# Patient Record
Sex: FEMALE | Race: WHITE | NOT HISPANIC OR LATINO | Employment: OTHER | URBAN - NONMETROPOLITAN AREA
[De-identification: names, ages, dates, MRNs, and addresses within clinical notes are randomized per-mention and may not be internally consistent; named-entity substitution may affect disease eponyms.]

---

## 2017-11-27 ENCOUNTER — HOSPITAL ENCOUNTER (EMERGENCY)
Facility: HOSPITAL | Age: 77
Discharge: HOME | End: 2017-11-27
Attending: EMERGENCY MEDICINE | Admitting: EMERGENCY MEDICINE

## 2017-11-27 VITALS
HEIGHT: 65 IN | BODY MASS INDEX: 21.66 KG/M2 | DIASTOLIC BLOOD PRESSURE: 71 MMHG | TEMPERATURE: 98.3 F | WEIGHT: 130 LBS | RESPIRATION RATE: 18 BRPM | HEART RATE: 76 BPM | SYSTOLIC BLOOD PRESSURE: 122 MMHG | OXYGEN SATURATION: 96 %

## 2017-11-27 DIAGNOSIS — S60.512A CAT SCRATCH OF LEFT HAND, INITIAL ENCOUNTER: Primary | ICD-10-CM
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
## 2017-11-27 PROCEDURE — 99283 EMERGENCY DEPT VISIT LOW MDM: CPT

## 2017-11-27 PROCEDURE — 25010000002 TDAP 5-2.5-18.5 LF-MCG/0.5 SUSPENSION: Performed by: EMERGENCY MEDICINE

## 2017-11-27 PROCEDURE — 90471 IMMUNIZATION ADMIN: CPT | Performed by: EMERGENCY MEDICINE

## 2017-11-27 PROCEDURE — 90715 TDAP VACCINE 7 YRS/> IM: CPT | Performed by: EMERGENCY MEDICINE

## 2017-11-27 RX ORDER — ASPIRIN 81 MG/1
81 TABLET, CHEWABLE ORAL DAILY
COMMUNITY
End: 2018-05-18 | Stop reason: HOSPADM

## 2017-11-27 RX ORDER — AMOXICILLIN AND CLAVULANATE POTASSIUM 875; 125 MG/1; MG/1
1 TABLET, FILM COATED ORAL 2 TIMES DAILY
Qty: 14 TABLET | Refills: 0 | Status: SHIPPED | OUTPATIENT
Start: 2017-11-27 | End: 2018-05-18 | Stop reason: HOSPADM

## 2017-11-27 RX ORDER — PRAVASTATIN SODIUM 10 MG
10 TABLET ORAL EVERY OTHER DAY
Status: ON HOLD | COMMUNITY
End: 2018-05-20

## 2017-11-27 RX ORDER — CHLORAL HYDRATE 500 MG
1000 CAPSULE ORAL 2 TIMES DAILY WITH MEALS
COMMUNITY
End: 2018-05-18 | Stop reason: HOSPADM

## 2017-11-27 RX ORDER — UBIDECARENONE 100 MG
100 CAPSULE ORAL NIGHTLY
COMMUNITY

## 2017-11-27 RX ADMIN — TETANUS TOXOID, REDUCED DIPHTHERIA TOXOID AND ACELLULAR PERTUSSIS VACCINE, ADSORBED 0.5 ML: 5; 2.5; 8; 8; 2.5 SUSPENSION INTRAMUSCULAR at 22:39

## 2017-12-05 NOTE — ED NOTES
"ED Call Back Questions    1. How are you doing since leaving the Emergency Department?    Doing better, healing up good  2. Do you have any questions about your discharge instructions? No     3. Have you filled your new prescriptions yet? Yes   a. Do you have any questions about those medications? No     4. Were you able to make a follow-up appointment with the physician? Yes     5. Do you have a primary care physician? Yes   a. If No, would you like for me to set you up with one? N/A  i. If Yes, “I will have our ED  give you a call right back at this number to work with you on the best time for an appointment.”    6. We are always looking to get better at what we do. Do you have any suggestions for what we can do to be even better? No   a. If Yes, \"Thank you for sharing your concerns. I apologize. I will follow up with our manager and patient . Would you like someone to call you back?\" No     7. Is there anything else I can do for you? No   Visit was good, dr. Rubin was great     Sebastien Rapp  12/05/17 1228    "

## 2018-05-17 ENCOUNTER — HOSPITAL ENCOUNTER (OUTPATIENT)
Facility: HOSPITAL | Age: 78
Setting detail: OBSERVATION
Discharge: HOME | End: 2018-05-18
Attending: FAMILY MEDICINE | Admitting: FAMILY MEDICINE

## 2018-05-17 ENCOUNTER — APPOINTMENT (OUTPATIENT)
Dept: GENERAL RADIOLOGY | Facility: HOSPITAL | Age: 78
End: 2018-05-17

## 2018-05-17 ENCOUNTER — APPOINTMENT (OUTPATIENT)
Dept: CARDIOLOGY | Facility: HOSPITAL | Age: 78
End: 2018-05-17

## 2018-05-17 PROBLEM — I48.91 ATRIAL FIBRILLATION WITH RVR (HCC): Status: ACTIVE | Noted: 2018-05-17

## 2018-05-17 LAB
ALBUMIN SERPL-MCNC: 4.4 G/DL (ref 3.5–5)
ALBUMIN/GLOB SERPL: 1.6 G/DL (ref 1.1–2.5)
ALP SERPL-CCNC: 101 U/L (ref 24–120)
ALT SERPL W P-5'-P-CCNC: 104 U/L (ref 0–54)
ANION GAP SERPL CALCULATED.3IONS-SCNC: 9 MMOL/L (ref 4–13)
ARTICHOKE IGE QN: 72 MG/DL (ref 0–99)
AST SERPL-CCNC: 52 U/L (ref 7–45)
BASOPHILS # BLD AUTO: 0.07 10*3/MM3 (ref 0–0.2)
BASOPHILS NFR BLD AUTO: 0.6 % (ref 0–2)
BH CV ECHO MEAS - AI DEC SLOPE: 338 CM/SEC^2
BH CV ECHO MEAS - AI MAX PG: 71.9 MMHG
BH CV ECHO MEAS - AI MAX VEL: 423.7 CM/SEC
BH CV ECHO MEAS - AI P1/2T: 367.1 MSEC
BH CV ECHO MEAS - AO MAX PG (FULL): 7.8 MMHG
BH CV ECHO MEAS - AO MAX PG: 10.2 MMHG
BH CV ECHO MEAS - AO MEAN PG (FULL): 5 MMHG
BH CV ECHO MEAS - AO MEAN PG: 6 MMHG
BH CV ECHO MEAS - AO ROOT AREA (BSA CORRECTED): 1.9
BH CV ECHO MEAS - AO ROOT AREA: 7.5 CM^2
BH CV ECHO MEAS - AO ROOT DIAM: 3.1 CM
BH CV ECHO MEAS - AO V2 MAX: 160 CM/SEC
BH CV ECHO MEAS - AO V2 MEAN: 122 CM/SEC
BH CV ECHO MEAS - AO V2 VTI: 35.7 CM
BH CV ECHO MEAS - AVA(I,A): 0.94 CM^2
BH CV ECHO MEAS - AVA(I,D): 0.94 CM^2
BH CV ECHO MEAS - AVA(V,A): 0.98 CM^2
BH CV ECHO MEAS - AVA(V,D): 0.98 CM^2
BH CV ECHO MEAS - BSA(HAYCOCK): 1.6 M^2
BH CV ECHO MEAS - BSA: 1.6 M^2
BH CV ECHO MEAS - BZI_BMI: 22.1 KILOGRAMS/M^2
BH CV ECHO MEAS - BZI_METRIC_HEIGHT: 162.6 CM
BH CV ECHO MEAS - BZI_METRIC_WEIGHT: 58.5 KG
BH CV ECHO MEAS - CONTRAST EF 4CH: 52.2 ML/M^2
BH CV ECHO MEAS - EDV(CUBED): 42.9 ML
BH CV ECHO MEAS - EDV(MOD-SP4): 36.6 ML
BH CV ECHO MEAS - EDV(TEICH): 50.9 ML
BH CV ECHO MEAS - EF(CUBED): 59 %
BH CV ECHO MEAS - EF(MOD-SP4): 52.2 %
BH CV ECHO MEAS - EF(TEICH): 51.6 %
BH CV ECHO MEAS - ESV(CUBED): 17.6 ML
BH CV ECHO MEAS - ESV(MOD-SP4): 17.5 ML
BH CV ECHO MEAS - ESV(TEICH): 24.6 ML
BH CV ECHO MEAS - FS: 25.7 %
BH CV ECHO MEAS - IVS/LVPW: 0.92
BH CV ECHO MEAS - IVSD: 1.1 CM
BH CV ECHO MEAS - LA DIMENSION: 3.4 CM
BH CV ECHO MEAS - LA/AO: 1.1
BH CV ECHO MEAS - LAT PEAK E' VEL: 9.3 CM/SEC
BH CV ECHO MEAS - LV DIASTOLIC VOL/BSA (35-75): 22.5 ML/M^2
BH CV ECHO MEAS - LV MASS(C)D: 127.3 GRAMS
BH CV ECHO MEAS - LV MASS(C)DI: 78.4 GRAMS/M^2
BH CV ECHO MEAS - LV MAX PG: 2.5 MMHG
BH CV ECHO MEAS - LV MEAN PG: 1 MMHG
BH CV ECHO MEAS - LV SYSTOLIC VOL/BSA (12-30): 10.8 ML/M^2
BH CV ECHO MEAS - LV V1 MAX: 78.3 CM/SEC
BH CV ECHO MEAS - LV V1 MEAN: 55.2 CM/SEC
BH CV ECHO MEAS - LV V1 VTI: 16.7 CM
BH CV ECHO MEAS - LVIDD: 3.5 CM
BH CV ECHO MEAS - LVIDS: 2.6 CM
BH CV ECHO MEAS - LVLD AP4: 5.8 CM
BH CV ECHO MEAS - LVLS AP4: 5.7 CM
BH CV ECHO MEAS - LVOT AREA (M): 2 CM^2
BH CV ECHO MEAS - LVOT AREA: 2 CM^2
BH CV ECHO MEAS - LVOT DIAM: 1.6 CM
BH CV ECHO MEAS - LVPWD: 1.2 CM
BH CV ECHO MEAS - MED PEAK E' VEL: 6.8 CM/SEC
BH CV ECHO MEAS - MR ALIAS VEL: 30.8 CM/SEC
BH CV ECHO MEAS - MR ERO: 0.09 CM^2
BH CV ECHO MEAS - MR FLOW RATE: 48.4 CM^3/SEC
BH CV ECHO MEAS - MR MAX PG: 108.6 MMHG
BH CV ECHO MEAS - MR MAX VEL: 521 CM/SEC
BH CV ECHO MEAS - MR MEAN PG: 74 MMHG
BH CV ECHO MEAS - MR MEAN VEL: 402 CM/SEC
BH CV ECHO MEAS - MR PISA RADIUS: 0.5 CM
BH CV ECHO MEAS - MR PISA: 1.6 CM^2
BH CV ECHO MEAS - MR VOLUME: 16.3 ML
BH CV ECHO MEAS - MR VTI: 175 CM
BH CV ECHO MEAS - MV DEC TIME: 0.25 SEC
BH CV ECHO MEAS - MV E MAX VEL: 108 CM/SEC
BH CV ECHO MEAS - RAP SYSTOLE: 10 MMHG
BH CV ECHO MEAS - RVDD: 3.1 CM
BH CV ECHO MEAS - RVSP: 44 MMHG
BH CV ECHO MEAS - SI(AO): 166 ML/M^2
BH CV ECHO MEAS - SI(CUBED): 15.6 ML/M^2
BH CV ECHO MEAS - SI(LVOT): 20.7 ML/M^2
BH CV ECHO MEAS - SI(MOD-SP4): 11.8 ML/M^2
BH CV ECHO MEAS - SI(TEICH): 16.2 ML/M^2
BH CV ECHO MEAS - SV(AO): 269.5 ML
BH CV ECHO MEAS - SV(CUBED): 25.3 ML
BH CV ECHO MEAS - SV(LVOT): 33.6 ML
BH CV ECHO MEAS - SV(MOD-SP4): 19.1 ML
BH CV ECHO MEAS - SV(TEICH): 26.3 ML
BH CV ECHO MEAS - TR MAX VEL: 318 CM/SEC
BH CV ECHO MEASUREMENTS AVERAGE E/E' RATIO: 13.42
BILIRUB SERPL-MCNC: 0.4 MG/DL (ref 0.1–1)
BUN BLD-MCNC: 17 MG/DL (ref 5–21)
BUN/CREAT SERPL: 25.8 (ref 7–25)
CALCIUM SPEC-SCNC: 9.3 MG/DL (ref 8.4–10.4)
CHLORIDE SERPL-SCNC: 93 MMOL/L (ref 98–110)
CHOLEST SERPL-MCNC: 149 MG/DL (ref 130–200)
CO2 SERPL-SCNC: 34 MMOL/L (ref 24–31)
CREAT BLD-MCNC: 0.66 MG/DL (ref 0.5–1.4)
DEPRECATED RDW RBC AUTO: 45 FL (ref 40–54)
EOSINOPHIL # BLD AUTO: 0.12 10*3/MM3 (ref 0–0.7)
EOSINOPHIL NFR BLD AUTO: 1 % (ref 0–4)
ERYTHROCYTE [DISTWIDTH] IN BLOOD BY AUTOMATED COUNT: 13 % (ref 12–15)
GFR SERPL CREATININE-BSD FRML MDRD: 87 ML/MIN/1.73
GLOBULIN UR ELPH-MCNC: 2.8 GM/DL
GLUCOSE BLD-MCNC: 87 MG/DL (ref 70–100)
HBA1C MFR BLD: 5.3 %
HCT VFR BLD AUTO: 44.5 % (ref 37–47)
HDLC SERPL-MCNC: 64 MG/DL
HGB BLD-MCNC: 14.7 G/DL (ref 12–16)
IMM GRANULOCYTES # BLD: 0.06 10*3/MM3 (ref 0–0.03)
IMM GRANULOCYTES NFR BLD: 0.5 % (ref 0–5)
LDLC/HDLC SERPL: 1.07 {RATIO}
LEFT ATRIUM VOLUME INDEX: 36.7 ML/M2
LEFT ATRIUM VOLUME: 59.5 CM3
LV EF 2D ECHO EST: 50 %
LYMPHOCYTES # BLD AUTO: 2.22 10*3/MM3 (ref 0.72–4.86)
LYMPHOCYTES NFR BLD AUTO: 18.9 % (ref 15–45)
MAGNESIUM SERPL-MCNC: 1.7 MG/DL (ref 1.4–2.2)
MAXIMAL PREDICTED HEART RATE: 142 BPM
MCH RBC QN AUTO: 31 PG (ref 28–32)
MCHC RBC AUTO-ENTMCNC: 33 G/DL (ref 33–36)
MCV RBC AUTO: 93.9 FL (ref 82–98)
MONOCYTES # BLD AUTO: 0.9 10*3/MM3 (ref 0.19–1.3)
MONOCYTES NFR BLD AUTO: 7.7 % (ref 4–12)
NEUTROPHILS # BLD AUTO: 8.39 10*3/MM3 (ref 1.87–8.4)
NEUTROPHILS NFR BLD AUTO: 71.3 % (ref 39–78)
NRBC BLD MANUAL-RTO: 0 /100 WBC (ref 0–0)
NT-PROBNP SERPL-MCNC: 1140 PG/ML (ref 0–1800)
PLATELET # BLD AUTO: 283 10*3/MM3 (ref 130–400)
PMV BLD AUTO: 8.6 FL (ref 6–12)
POTASSIUM BLD-SCNC: 4.8 MMOL/L (ref 3.5–5.3)
PROT SERPL-MCNC: 7.2 G/DL (ref 6.3–8.7)
RBC # BLD AUTO: 4.74 10*6/MM3 (ref 4.2–5.4)
SODIUM BLD-SCNC: 136 MMOL/L (ref 135–145)
STRESS TARGET HR: 121 BPM
T4 FREE SERPL-MCNC: 1.42 NG/DL (ref 0.78–2.19)
TRIGL SERPL-MCNC: 84 MG/DL (ref 0–149)
TROPONIN I SERPL-MCNC: <0.012 NG/ML (ref 0–0.03)
TSH SERPL DL<=0.05 MIU/L-ACNC: 2.48 MIU/ML (ref 0.47–4.68)
WBC NRBC COR # BLD: 11.76 10*3/MM3 (ref 4.8–10.8)

## 2018-05-17 PROCEDURE — 96375 TX/PRO/DX INJ NEW DRUG ADDON: CPT

## 2018-05-17 PROCEDURE — 84439 ASSAY OF FREE THYROXINE: CPT | Performed by: FAMILY MEDICINE

## 2018-05-17 PROCEDURE — G0378 HOSPITAL OBSERVATION PER HR: HCPCS

## 2018-05-17 PROCEDURE — 96361 HYDRATE IV INFUSION ADD-ON: CPT

## 2018-05-17 PROCEDURE — 84443 ASSAY THYROID STIM HORMONE: CPT | Performed by: FAMILY MEDICINE

## 2018-05-17 PROCEDURE — 93306 TTE W/DOPPLER COMPLETE: CPT | Performed by: INTERNAL MEDICINE

## 2018-05-17 PROCEDURE — 96366 THER/PROPH/DIAG IV INF ADDON: CPT

## 2018-05-17 PROCEDURE — 83036 HEMOGLOBIN GLYCOSYLATED A1C: CPT | Performed by: PHYSICIAN ASSISTANT

## 2018-05-17 PROCEDURE — 96365 THER/PROPH/DIAG IV INF INIT: CPT

## 2018-05-17 PROCEDURE — 94799 UNLISTED PULMONARY SVC/PX: CPT

## 2018-05-17 PROCEDURE — 80053 COMPREHEN METABOLIC PANEL: CPT | Performed by: FAMILY MEDICINE

## 2018-05-17 PROCEDURE — 80061 LIPID PANEL: CPT | Performed by: PHYSICIAN ASSISTANT

## 2018-05-17 PROCEDURE — 83735 ASSAY OF MAGNESIUM: CPT | Performed by: PHYSICIAN ASSISTANT

## 2018-05-17 PROCEDURE — 84484 ASSAY OF TROPONIN QUANT: CPT | Performed by: FAMILY MEDICINE

## 2018-05-17 PROCEDURE — 93306 TTE W/DOPPLER COMPLETE: CPT

## 2018-05-17 PROCEDURE — 85025 COMPLETE CBC W/AUTO DIFF WBC: CPT | Performed by: FAMILY MEDICINE

## 2018-05-17 PROCEDURE — 94760 N-INVAS EAR/PLS OXIMETRY 1: CPT

## 2018-05-17 PROCEDURE — 71046 X-RAY EXAM CHEST 2 VIEWS: CPT

## 2018-05-17 PROCEDURE — 83880 ASSAY OF NATRIURETIC PEPTIDE: CPT | Performed by: PHYSICIAN ASSISTANT

## 2018-05-17 PROCEDURE — 93005 ELECTROCARDIOGRAM TRACING: CPT | Performed by: FAMILY MEDICINE

## 2018-05-17 PROCEDURE — 99205 OFFICE O/P NEW HI 60 MIN: CPT | Performed by: INTERNAL MEDICINE

## 2018-05-17 PROCEDURE — G0379 DIRECT REFER HOSPITAL OBSERV: HCPCS

## 2018-05-17 PROCEDURE — 93010 ELECTROCARDIOGRAM REPORT: CPT | Performed by: INTERNAL MEDICINE

## 2018-05-17 RX ORDER — SODIUM CHLORIDE 0.9 % (FLUSH) 0.9 %
1-10 SYRINGE (ML) INJECTION AS NEEDED
Status: DISCONTINUED | OUTPATIENT
Start: 2018-05-17 | End: 2018-05-18 | Stop reason: HOSPADM

## 2018-05-17 RX ORDER — SODIUM CHLORIDE 9 MG/ML
100 INJECTION, SOLUTION INTRAVENOUS CONTINUOUS
Status: DISCONTINUED | OUTPATIENT
Start: 2018-05-17 | End: 2018-05-18 | Stop reason: HOSPADM

## 2018-05-17 RX ORDER — DILTIAZEM HYDROCHLORIDE 5 MG/ML
10 INJECTION INTRAVENOUS ONCE
Status: COMPLETED | OUTPATIENT
Start: 2018-05-17 | End: 2018-05-17

## 2018-05-17 RX ADMIN — DILTIAZEM HYDROCHLORIDE 30 MG: 30 TABLET, FILM COATED ORAL at 17:30

## 2018-05-17 RX ADMIN — DILTIAZEM HYDROCHLORIDE 5 MG/HR: 5 INJECTION INTRAVENOUS at 14:21

## 2018-05-17 RX ADMIN — DILTIAZEM HYDROCHLORIDE 10 MG: 5 INJECTION INTRAVENOUS at 14:22

## 2018-05-17 RX ADMIN — SODIUM CHLORIDE 100 ML/HR: 9 INJECTION, SOLUTION INTRAVENOUS at 16:47

## 2018-05-17 RX ADMIN — APIXABAN 5 MG: 5 TABLET, FILM COATED ORAL at 20:01

## 2018-05-17 NOTE — PLAN OF CARE
Problem: Patient Care Overview  Goal: Plan of Care Review  Outcome: Ongoing (interventions implemented as appropriate)   05/17/18 9268   Coping/Psychosocial   Plan of Care Reviewed With patient   Plan of Care Review   Progress no change   OTHER   Outcome Summary Patient direct admitted from Dr. Javier with Afib. Was started on cardizem drip and rate improved to 90s-low 100s. Started on PO and will wean drip off.        Problem: Arrhythmia/Dysrhythmia (Symptomatic) (Adult)  Goal: Signs and Symptoms of Listed Potential Problems Will be Absent, Minimized or Managed (Arrhythmia/Dysrhythmia)  Outcome: Ongoing (interventions implemented as appropriate)

## 2018-05-17 NOTE — CONSULTS
"[facility] CONSULT NOTE    Referring Provider: Nash Javier MD    Reason for Consultation: new afib    No chief complaint on file.      Subjective .     History of present illness:  Merari Gaspar is a 78 y.o. female with history of tobacco abuse, hyperlipidemia, peripheral arterial disease who presents to [facility] as a direct admit from her PCP office for atrial fibrillation with rapid ventricular response. Ms. Gaspar notes that she went to her doctor's office today for \"a physical\". She denies feeling palpitations/irregular rhythm. She does endorse for appx the last month she has noted somewhat worsening of exertional dyspnea, which is a chronic issue for her. She describes orthopnea, having to prop up on 2 pillows at night for the past month. She also endorses associated bilateral lower extremity edema for 1 month's time. She says she has had the flu/cold/bronchitis on and off for the past 6 months. She otherwise denies any recent illness, travel, sick contacts or similar. She reports that she has had occasional chest pressure, substernal, while walking. Again, she feels this has been occurring for about the past month or so. She tells me this just goes away on its own. She does not rest, but keeps going, when this occurs. She tells me since this occurred today she is \"done smoking.\" She tells me she used to take Plavix for peripheral arterial disease and was taken off due to nose bleeding.     History  Past Medical History:   Diagnosis Date   • COPD (chronic obstructive pulmonary disease)    • Hypercholesteremia    • Smoker    ,   Past Surgical History:   Procedure Laterality Date   • BLADDER SURGERY     • HYSTERECTOMY     ,   No family history on file.,   Social History   Substance Use Topics   • Smoking status: Current Every Day Smoker     Packs/day: 0.50   • Smokeless tobacco: Not on file   • Alcohol use No   ,     Medications  Current Facility-Administered Medications   Medication " "Dose Route Frequency Provider Last Rate Last Dose   • diltiaZEM (CARDIZEM) 125 mg in sodium chloride 0.9 % 125 mL (1 mg/mL) infusion  5-15 mg/hr Intravenous Titrated Nash Javier MD 5 mL/hr at 05/17/18 1421 5 mg/hr at 05/17/18 1421   • diltiaZEM (CARDIZEM) tablet 30 mg  30 mg Oral Q6H Ivanna Bell PA-C           Allergies:  Patient has no known allergies.    Review of Systems  Review of Systems   Constitution: Negative for malaise/fatigue and weight gain.   Cardiovascular: Positive for chest pain, dyspnea on exertion and leg swelling. Negative for claudication, irregular heartbeat, near-syncope, orthopnea, palpitations, paroxysmal nocturnal dyspnea and syncope.   Respiratory: Negative for hemoptysis and shortness of breath.    Hematologic/Lymphatic: Negative for bleeding problem.   Skin: Negative for poor wound healing.   Musculoskeletal: Negative for myalgias.   Gastrointestinal: Negative for melena, nausea and vomiting.   Genitourinary: Negative for hematuria.   Neurological: Negative for dizziness, focal weakness and light-headedness.   Psychiatric/Behavioral: Negative for memory loss.   All other systems reviewed and are negative.      Objective     Physical Exam:  Patient Vitals for the past 24 hrs:   BP Temp Temp src Pulse Resp SpO2 Height Weight   05/17/18 1434 - - - 102 - - - -   05/17/18 1432 - - - (!) 123 - - - -   05/17/18 1349 140/80 97.8 °F (36.6 °C) Temporal Art (!) 156 18 94 % 163.8 cm (64.5\") 58.8 kg (129 lb 9.6 oz)     Physical Exam   Constitutional: She is oriented to person, place, and time. She appears well-developed and well-nourished.   HENT:   Head: Normocephalic and atraumatic.   Eyes: Conjunctivae and EOM are normal. Pupils are equal, round, and reactive to light.   Neck: Normal range of motion. Neck supple. No JVD present.   Cardiovascular: S1 normal, S2 normal, normal heart sounds, intact distal pulses and normal pulses.  An irregularly irregular rhythm present. Tachycardia " "present.    No murmur heard.  Pulmonary/Chest: Effort normal and breath sounds normal. No respiratory distress.   Abdominal: Soft. Bowel sounds are normal. She exhibits no distension.   Musculoskeletal: She exhibits edema (trace BL ankles). She exhibits no tenderness.   Neurological: She is alert and oriented to person, place, and time.   Skin: Skin is warm and dry.   Psychiatric: She has a normal mood and affect. Judgment normal.   Vitals reviewed.      Results Review:   I reviewed the patient's new clinical results.    Lab Results (last 72 hours)     ** No results found for the last 72 hours. **          No results found for: ECHOEFEST    Imaging Results (last 72 hours)     ** No results found for the last 72 hours. **        Assessment   1. New onset atrial fibrillation with rapid ventricular response  2. ? New onset heart failure: patient describes symptoms of orthopnea, worsening exertional dyspnea and leg swelling x 1 month. Do not appreciate any overt evidence on exam  3. Hyperlipidemia: on statin  4. Tobacco abuse: 1/2 ppd, patient plans to quit \"cold turkey\"  5. Chest pressure: none this admission so far  6. Peripheral arterial disease: no stents or similar intervention per patient report      Plan   1. Cardizem drip. Wean as able. Cutler 30 mg cardizem orally q 6. Titrate up as needed.   2. Check echo, BNP, CXR BMP, CBC, TSH, Mg to evaluate any reversible/ underlying cause of atrial fibrillation. Will also check HgA1C, lipid panel for further risk stratification. Her occasional chest pressure may be secondary to tachycardia with atrial fibrillation; however, would consider ischemic workup, given this complaint, exertional dyspnea and multiple risk factors. Further consideration pending above results and patient's response to rate control.   3. Begin Eliquis. Patient does report she has had epistaxis with plavix use in the past. We will monitor her closely. No other significant bleeding history or other " contraindication for anticoagulation.     Further orders per Dr. Strong upon his evaluation of the patient.     Thank you for the consultation, cardiology will gladly continue to follow.     Ivanna Bell PA-C        Please note this cardiology consultation note is the result of a face to face consultation with the patient, in addition to reviewing medical records at length by myself, Ivanna Bell PA-C.    Time: appx 35 min

## 2018-05-18 VITALS
OXYGEN SATURATION: 93 % | HEIGHT: 65 IN | HEART RATE: 101 BPM | DIASTOLIC BLOOD PRESSURE: 52 MMHG | TEMPERATURE: 97.5 F | SYSTOLIC BLOOD PRESSURE: 94 MMHG | WEIGHT: 129.6 LBS | RESPIRATION RATE: 18 BRPM | BODY MASS INDEX: 21.59 KG/M2

## 2018-05-18 PROBLEM — M15.0 PRIMARY OSTEOARTHRITIS INVOLVING MULTIPLE JOINTS: Chronic | Status: ACTIVE | Noted: 2018-05-18

## 2018-05-18 PROBLEM — J43.1 PANLOBULAR EMPHYSEMA: Chronic | Status: ACTIVE | Noted: 2018-05-18

## 2018-05-18 PROBLEM — I10 ESSENTIAL HYPERTENSION: Chronic | Status: ACTIVE | Noted: 2018-05-18

## 2018-05-18 PROBLEM — E78.00 PURE HYPERCHOLESTEROLEMIA: Chronic | Status: ACTIVE | Noted: 2018-05-18

## 2018-05-18 PROBLEM — M15.9 PRIMARY OSTEOARTHRITIS INVOLVING MULTIPLE JOINTS: Chronic | Status: ACTIVE | Noted: 2018-05-18

## 2018-05-18 PROCEDURE — 96361 HYDRATE IV INFUSION ADD-ON: CPT

## 2018-05-18 PROCEDURE — 99214 OFFICE O/P EST MOD 30 MIN: CPT | Performed by: INTERNAL MEDICINE

## 2018-05-18 PROCEDURE — G0378 HOSPITAL OBSERVATION PER HR: HCPCS

## 2018-05-18 RX ORDER — DILTIAZEM HYDROCHLORIDE 60 MG/1
60 TABLET, FILM COATED ORAL 3 TIMES DAILY
Qty: 90 TABLET | Refills: 1 | Status: SHIPPED | OUTPATIENT
Start: 2018-05-18 | End: 2018-05-18 | Stop reason: HOSPADM

## 2018-05-18 RX ORDER — DILTIAZEM HYDROCHLORIDE 180 MG/1
180 CAPSULE, COATED, EXTENDED RELEASE ORAL
Qty: 30 CAPSULE | Refills: 11 | Status: ON HOLD | OUTPATIENT
Start: 2018-05-18 | End: 2018-05-20

## 2018-05-18 RX ORDER — DILTIAZEM HYDROCHLORIDE 180 MG/1
180 CAPSULE, COATED, EXTENDED RELEASE ORAL
Status: DISCONTINUED | OUTPATIENT
Start: 2018-05-18 | End: 2018-05-18 | Stop reason: HOSPADM

## 2018-05-18 RX ORDER — DILTIAZEM HYDROCHLORIDE 60 MG/1
60 TABLET, FILM COATED ORAL EVERY 6 HOURS SCHEDULED
Status: DISCONTINUED | OUTPATIENT
Start: 2018-05-18 | End: 2018-05-18

## 2018-05-18 RX ADMIN — APIXABAN 5 MG: 5 TABLET, FILM COATED ORAL at 09:06

## 2018-05-18 RX ADMIN — DILTIAZEM HYDROCHLORIDE 30 MG: 30 TABLET, FILM COATED ORAL at 05:40

## 2018-05-18 RX ADMIN — SODIUM CHLORIDE 100 ML/HR: 9 INJECTION, SOLUTION INTRAVENOUS at 01:56

## 2018-05-18 RX ADMIN — DILTIAZEM HYDROCHLORIDE 30 MG: 30 TABLET, FILM COATED ORAL at 00:32

## 2018-05-18 RX ADMIN — DILTIAZEM HYDROCHLORIDE 180 MG: 180 CAPSULE, COATED, EXTENDED RELEASE ORAL at 10:34

## 2018-05-18 RX ADMIN — DILTIAZEM HYDROCHLORIDE 30 MG: 30 TABLET, FILM COATED ORAL at 07:30

## 2018-05-18 NOTE — H&P
History and Physical      CHIEF COMPLAINT:  Shortness of air    Reason for Admission:  New onset a-fib with RVR    History Obtained From: pt/    HISTORY OF PRESENT ILLNESS:      The patient is a 78 y.o. female with significant past medical history of hypertension and dyslipidemia with significant COPD emphysema who presents with atrial fibrillation with a rapid ventricular response.  Patient presented to the office with a routine follow-up and was found to be tachycardic and in atrial fibrillation.  She was directly admitted for cardiology consultation and further delineation of care.  She was placed on a Cardizem drip and anticoagulation upon presentation.  There are no other precipitating or relieving events.  She states the racing heart and shortness of air had been present for the past 4-6 weeks.  She is been known to be a heavy tobacco smoker in the past but swears that she is going to quit cold turkey.  She had no pain associated with this episode.    Past Medical History:    Past Medical History:   Diagnosis Date   • COPD (chronic obstructive pulmonary disease)    • Hypercholesteremia    • Smoker        Past Surgical History:    Past Surgical History:   Procedure Laterality Date   • BLADDER SURGERY     • HYSTERECTOMY         Medications Prior to Admission:    Prescriptions Prior to Admission   Medication Sig Dispense Refill Last Dose   • aspirin 81 MG chewable tablet Chew 81 mg Daily.   5/16/2018 at 2100   • coenzyme Q10 100 MG capsule Take 100 mg by mouth Daily.   5/16/2018 at 2100   • Multiple Vitamins-Minerals (MULTIVITAL PO) Take 1 tablet by mouth Daily.   5/17/2018 at 0900   • Omega-3 Fatty Acids (FISH OIL) 1000 MG capsule capsule Take 1,000 mg by mouth 2 (Two) Times a Day With Meals.   5/17/2018 at 0900   • pravastatin (PRAVACHOL) 10 MG tablet Take 10 mg by mouth Every Other Day. Alternate with a 20mg, took the 10mg last night   5/16/2018 at 2100   • amoxicillin-clavulanate (AUGMENTIN) 875-125 MG  per tablet Take 1 tablet by mouth 2 (Two) Times a Day. 14 tablet 0        Allergies:  Patient has no known allergies.    Social History:   Social History     Social History   • Marital status:      Spouse name: N/A   • Number of children: N/A   • Years of education: N/A     Occupational History   • Not on file.     Social History Main Topics   • Smoking status: Current Every Day Smoker     Packs/day: 0.50   • Smokeless tobacco: Not on file   • Alcohol use No   • Drug use: No   • Sexual activity: Not on file     Other Topics Concern   • Not on file     Social History Narrative   • No narrative on file       Family History:   No family history on file.    REVIEW OF SYSTEMS:    CONSTITUTIONAL:  Negative for anorexia, chills, fevers, night sweats and weight loss  EYES:  negative for eye dryness, icterus and redness  HEENT:   negative for dental problems, epistaxis, facial trauma and thrush  RESPIRATORY: Coarse cough with dyspnea on exertion   CARDIOVASCULAR: Some heart palpitations noted, vague type chest pain on exertion  GASTROINTESTINAL:  negative for abdominal pain, hematemesis, jaundice, melena and rectal bleeding  MUSCULOSKELETAL:  negative for muscle weakness, myalgias and neck pain  NEUROLOGICAL:   negative for dizziness, headaches, seizures, speech problems, tremors and vertigo  INTEGUMENT: negative for pruritus, rash, skin color change and skin lesion(s)       Vital Signs   Temp:  [97.3 °F (36.3 °C)-98.3 °F (36.8 °C)] 97.5 °F (36.4 °C)  Heart Rate:  [] 104  Resp:  [15-18] 15  BP: ()/(59-80) 110/62          Physical Exam:  Constitutional: The patient is oriented to person, place, and time. They appear well-developed.   HEENT:   Head: Normocephalic and atraumatic.   Eyes: Pupils are equal, round, and reactive to light.   Neck: Neck supple without masses or carotid bruit.  Cardiovascular: Irregularly irregular with a heart rate of 160    Pulmonary/Chest: Decreased air movement with an end  expiratory wheeze  Abdominal: Soft. Bowel sounds are normal. There is  no distension or  Tenderness appreciated. There is no rebound and no guarding.   Musculoskeletal: Normal range of motion. There is  no edema or tenderness.   Neurological: Pt is alert and oriented to person, place, and time. They have normal reflexes. CN 2-12 appear grossly intact  Skin: Skin is warm and dry without significant rashes     Results Review:   I reviewed the patient's new imaging results and agree with the interpretation.    DATA:  Lab Results (last 24 hours)     Procedure Component Value Units Date/Time    Hemoglobin A1c [[id number]] Collected:  05/17/18 1449    Specimen:  Blood Updated:  05/17/18 1549     Hemoglobin A1C 5.3 %     Narrative:       Less than 6.0           Non-Diabetic Range  6.0-7.0                 ADA Therapeutic Target  Greater than 7.0        Action Suggested    TSH [[id number]]  (Normal) Collected:  05/17/18 1449    Specimen:  Blood Updated:  05/17/18 1541     TSH 2.480 mIU/mL     T4, Free [[id number]]  (Normal) Collected:  05/17/18 1449    Specimen:  Blood Updated:  05/17/18 1527     Free T4 1.42 ng/dL     Troponin [[id number]]  (Normal) Collected:  05/17/18 1449    Specimen:  Blood Updated:  05/17/18 1525     Troponin I <0.012 ng/mL     BNP [[id number]]  (Normal) Collected:  05/17/18 1449    Specimen:  Blood Updated:  05/17/18 1525     proBNP 1,140.0 pg/mL     Lipid Panel [[id number]] Collected:  05/17/18 1449    Specimen:  Blood Updated:  05/17/18 1521     Total Cholesterol 149 mg/dL      Triglycerides 84 mg/dL      HDL Cholesterol 64 mg/dL      LDL Cholesterol  72 mg/dL      LDL/HDL Ratio 1.07    Comprehensive Metabolic Panel [[id number]]  (Abnormal) Collected:  05/17/18 1449    Specimen:  Blood Updated:  05/17/18 1511     Glucose 87 mg/dL      BUN 17 mg/dL      Creatinine 0.66 mg/dL      Sodium 136 mmol/L      Potassium 4.8 mmol/L      Chloride 93 (L) mmol/L      CO2 34.0 (H) mmol/L      Calcium 9.3 mg/dL       Total Protein 7.2 g/dL      Albumin 4.40 g/dL      ALT (SGPT) 104 (H) U/L      AST (SGOT) 52 (H) U/L      Alkaline Phosphatase 101 U/L      Total Bilirubin 0.4 mg/dL      eGFR Non African Amer 87 mL/min/1.73      Globulin 2.8 gm/dL      A/G Ratio 1.6 g/dL      BUN/Creatinine Ratio 25.8 (H)     Anion Gap 9.0 mmol/L     Narrative:       The MDRD GFR formula is only valid for adults with stable renal function between ages 18 and 70.    Magnesium [[id number]]  (Normal) Collected:  05/17/18 1449    Specimen:  Blood Updated:  05/17/18 1511     Magnesium 1.7 mg/dL     CBC & Differential [[id number]] Collected:  05/17/18 1449    Specimen:  Blood Updated:  05/17/18 1458    Narrative:       The following orders were created for panel order CBC & Differential.  Procedure                               Abnormality         Status                     ---------                               -----------         ------                     CBC Auto Differential[[id number]]        Abnormal            Final result                 Please view results for these tests on the individual orders.    CBC Auto Differential [[id number]]  (Abnormal) Collected:  05/17/18 1449    Specimen:  Blood Updated:  05/17/18 1458     WBC 11.76 (H) 10*3/mm3      RBC 4.74 10*6/mm3      Hemoglobin 14.7 g/dL      Hematocrit 44.5 %      MCV 93.9 fL      MCH 31.0 pg      MCHC 33.0 g/dL      RDW 13.0 %      RDW-SD 45.0 fl      MPV 8.6 fL      Platelets 283 10*3/mm3      Neutrophil % 71.3 %      Lymphocyte % 18.9 %      Monocyte % 7.7 %      Eosinophil % 1.0 %      Basophil % 0.6 %      Immature Grans % 0.5 %      Neutrophils, Absolute 8.39 10*3/mm3      Lymphocytes, Absolute 2.22 10*3/mm3      Monocytes, Absolute 0.90 10*3/mm3      Eosinophils, Absolute 0.12 10*3/mm3      Basophils, Absolute 0.07 10*3/mm3      Immature Grans, Absolute 0.06 (H) 10*3/mm3      nRBC 0.0 /100 WBC         Imaging Results (last 24 hours)     Procedure Component Value Units Date/Time    XR  Chest PA & Lateral [[id number]] Collected:  05/17/18 1545     Updated:  05/17/18 1549    Narrative:       EXAMINATION: XR CHEST PA AND LATERAL-     5/17/2018 3:35 PM CDT     HISTORY: Atrial fibrillation.     2 view chest x-ray compared with 10/11/2007.     Cardiomegaly.     Chronic appearing interstitial lung disease with no focal infiltrate or  pleural effusion.     No pneumothorax or heart failure.     Summary:  1. Cardiomegaly and interstitial lung disease.  This report was finalized on 05/17/2018 15:46 by Dr. Oumar Hernandez MD.            ASSESSMENT AND PLAN:      1.   Active Problems:    Atrial fibrillation with RVR    Essential hypertension    Panlobular emphysema    Pure hypercholesterolemia    Primary osteoarthritis involving multiple joints   new onset atrial fibrillation with a rapid ventricular response.  Unknown duration.  Responded to a Cardizem drip.  Will switch to oral Cardizem.  Initiate anticoagulation with ELIQUOUS -risks versus benefit discussed with patient.  Apparently she had epistaxis on Plavix in the past.  Persistent discussion with the patient concerning discontinuation of tobacco products.  Appreciate cardiology's input today.    Nash Javier MD  6:52 AM 5/18/2018

## 2018-05-18 NOTE — PLAN OF CARE
Problem: Patient Care Overview  Goal: Plan of Care Review  Outcome: Ongoing (interventions implemented as appropriate)   05/18/18 0547   Coping/Psychosocial   Plan of Care Reviewed With patient   Plan of Care Review   Progress no change   OTHER   Outcome Summary AF 84-99 on tele on 30 mg PO cardizem. No c/o pain. Normal saline continued at 100. Will continue to monitor.        Problem: Arrhythmia/Dysrhythmia (Symptomatic) (Adult)  Goal: Signs and Symptoms of Listed Potential Problems Will be Absent, Minimized or Managed (Arrhythmia/Dysrhythmia)  Outcome: Ongoing (interventions implemented as appropriate)   05/17/18 6774   Goal/Outcome Evaluation   Problems Assessed (Arrhythmia/Dysrhythmia) all   Problems Present (Dysrhythmia) electrophysiologic conduction defect

## 2018-05-18 NOTE — PROGRESS NOTES
[facility] -  Progress Note     LOS: 0 days   Patient Care Team:  Nash Javier MD as PCP - General (Family Medicine)  Sohail Strong MD as Consulting Physician (Cardiology)    Chief Complaint: Follow-up atrial fibrillation    Subjective     Interval History: Patient remains asymptomatic.  Was transitioned off of diltiazem infusion overnight in favor of short acting diltiazem.  Receiving 30 mg every 6 hours, her ventricular rate remained mostly well controlled, but did trend upward slightly to the 120s, prompting an increase in the short-acting dose to 60 mg.  She has no associated dizziness, palpitations, lightheadedness, angina, or dyspnea.    Review of Systems:  Review of Systems   Constitution: Negative for malaise/fatigue.   Cardiovascular: Negative for chest pain, claudication, dyspnea on exertion, leg swelling, near-syncope, orthopnea, palpitations, paroxysmal nocturnal dyspnea and syncope.   Respiratory: Negative for shortness of breath.    Hematologic/Lymphatic: Does not bruise/bleed easily.       Vital Sign Min/Max for last 24 hours  Temp  Min: 97.3 °F (36.3 °C)  Max: 98.3 °F (36.8 °C)   BP  Min: 92/59  Max: 140/80   Pulse  Min: 90  Max: 156   Resp  Min: 15  Max: 18   SpO2  Min: 91 %  Max: 96 %   No Data Recorded   Weight  Min: 58.8 kg (129 lb 9.6 oz)  Max: 58.8 kg (129 lb 9.6 oz)     1    05/17/18  1349   Weight: 58.8 kg (129 lb 9.6 oz)       Physical Exam:   Physical Exam   Constitutional: No distress.   Neck: No JVD present.   Cardiovascular: Normal rate, S1 normal, S2 normal, normal heart sounds, intact distal pulses and normal pulses.  An irregularly irregular rhythm present.   Pulmonary/Chest: Effort normal and breath sounds normal.   Abdominal: Soft. There is no tenderness.   Neurological: She is alert and oriented to person, place, and time.   Skin: Skin is warm and dry.       Medication Review: yes  Current Facility-Administered Medications   Medication Dose Route  Frequency Provider Last Rate Last Dose   • apixaban (ELIQUIS) tablet 5 mg  5 mg Oral Q12H Ivanna Bell PA-C   5 mg at 05/18/18 0906   • diltiaZEM (CARDIZEM) 125 mg in sodium chloride 0.9 % 125 mL (1 mg/mL) infusion  5-15 mg/hr Intravenous Titrated Nash Javier MD   Stopped at 05/17/18 1908   • diltiaZEM CD (CARDIZEM CD) 24 hr capsule 180 mg  180 mg Oral Q24H Sohail Strong MD       • sodium chloride 0.9 % flush 1-10 mL  1-10 mL Intravenous PRN Nash Javier MD       • sodium chloride 0.9 % infusion  100 mL/hr Intravenous Continuous Nash Javier MD   Stopped at 05/18/18 0729         Results Review:   I have reviewed all laboratory data     I have reviewed telemetry, which shows Atrial fibrillation, rates ranging  with transient increase up into 120s earlier this morning    Results for orders placed during the hospital encounter of 05/17/18   Adult Transthoracic Echo Complete W/ Cont if Necessary Per Protocol    Narrative · Left ventricular systolic function is low normal. Estimated EF = 50%.   Very mild global hypokinesis.  · Left ventricular wall thickness is consistent with mild concentric   hypertrophy.  · Mild to moderate aortic valve regurgitation is present.  · Mild mitral valve regurgitation is present  · Left atrial cavity size is mild-to-moderately dilated.  · Calculated right ventricular systolic pressure from tricuspid   regurgitation is 44 mmHg.  · Right ventricular cavity is borderline dilated.  · There is a small (<1cm) circumferential pericardial effusion.  · Atrial fibrillation was the predominant rhythm observed during the   procedure.            Assessment/Plan     Active Problems:  1. Atrial fibrillation with RVR-asymptomatic.  Rates improved but recently did increase short-acting doses 60 mg.  Will transition to once daily dosing for ease of administration at this point, and will give 180 mg 24-hour capsule now.  Continue to observe the patient for 2-4 hours  afterwards, to ensure heart rates (while at rest) remain less than 110 BPM.  -Can discontinue aspirin at discharge   -prescription for Eliquis 5mg po bid has been sent by Dr. Sim  -I have discontinued the prescription for short-acting diltiazem and sent prescription to her pharmacy for 180 mg 24-hour acting diltiazem  -Please follow-up with me in the clinic in 4 weeks     2. Essential hypertension: Well-controlled.   3. Pure hypercholesterolemia: Continue pravastatin.  LDL is well controlled.  Can discontinue fish oil.   4. Tobacco abuse: encouraged to maintain cessation.    Okay to discharge from my standpoint after short period of observation after she receives first dose of 24-hour acting diltiazem.  I would like to see the patient in my office 4 weeks after discharge.  Thanks again for the opportunity to participate in the care of your patient.    Sohail Strong MD  05/18/18  9:23 AM

## 2018-05-18 NOTE — DISCHARGE SUMMARY
Hospital Discharge Summary    Merari Gaspar  :  1940  MRN:  [id number]    Admit date:  2018  Discharge date:  2018    Admitting Physician:    Nash Javier MD    Discharge Diagnoses:    Active Problems:    Atrial fibrillation with RVR    Essential hypertension    Panlobular emphysema    Pure hypercholesterolemia    Primary osteoarthritis involving multiple joints      Hospital Course:   The patient was admitted for the above surgical/medical indication.  Please see admission H&P for further details concerning the admission.  The patient was seen daily and progress noted via daily updates in the progress notes.  The patient improved throughout her stay.  They reached maximum medical improvement and were considered stable for discharge home.  They understand the importance of follow-up concerning any abnormal lab values/x-rays.  All questions were answered to the best of my ability prior to their discharge home.  Patient presented to the office for routine follow-up.  She was found to have a tachycardic rate with a irregular irregular rhythm and was diagnosed with new onset atrial fibrillation.  Elected to be admitted overnight for cardiology consultation and rate stabilization.  She was started on a Cardizem drip and subsequent switched over to oral Cardizem.  Anticoagulation was initiated.  Risks versus the benefit of anticoagulation was discussed with patient as she has had a history of epistaxis on Plavix.  Following morning she was requesting discharge home.  Maximum inpatient benefit was noted and she was discharged home  She was once again educated about discontinuation of all tobacco products  Discharge Medications:       Merari Gaspar   Home Medication Instructions SONU:[id number]    Printed on:18 0659   Medication Information                      apixaban (ELIQUIS) 5 MG tablet tablet  Take 1 tablet by mouth Every 12 (Twelve) Hours.             aspirin 81 MG chewable  tablet  Chew 81 mg Daily.             coenzyme Q10 100 MG capsule  Take 100 mg by mouth Daily.             diltiaZEM (CARDIZEM) 60 MG tablet  Take 1 tablet by mouth 3 (Three) Times a Day.             Multiple Vitamins-Minerals (MULTIVITAL PO)  Take 1 tablet by mouth Daily.             Omega-3 Fatty Acids (FISH OIL) 1000 MG capsule capsule  Take 1,000 mg by mouth 2 (Two) Times a Day With Meals.             pravastatin (PRAVACHOL) 10 MG tablet  Take 10 mg by mouth Every Other Day. Alternate with a 20mg, took the 10mg last night                 Consults: Cardiology    Significant Diagnostic Studies:  Echocardiogram    EKG: normal EKG, normal sinus rhythm, unchanged from previous tracings, atrial fibrillation, rate 80.      Treatments:   IV Cardizem and cardiac monitoring    Disposition:   Home  Follow up with Nash Javier MD in 1-2 weeks.    Signed:  Nash Javier MD   5/18/2018, 6:59 AM

## 2018-05-20 ENCOUNTER — APPOINTMENT (OUTPATIENT)
Dept: GENERAL RADIOLOGY | Facility: HOSPITAL | Age: 78
End: 2018-05-20

## 2018-05-20 ENCOUNTER — HOSPITAL ENCOUNTER (INPATIENT)
Facility: HOSPITAL | Age: 78
LOS: 3 days | Discharge: HOME | End: 2018-05-23
Attending: EMERGENCY MEDICINE | Admitting: FAMILY MEDICINE

## 2018-05-20 ENCOUNTER — APPOINTMENT (OUTPATIENT)
Dept: CT IMAGING | Facility: HOSPITAL | Age: 78
End: 2018-05-20

## 2018-05-20 DIAGNOSIS — R60.0 LOWER EXTREMITY EDEMA: ICD-10-CM

## 2018-05-20 DIAGNOSIS — I48.91 ATRIAL FIBRILLATION WITH RVR (HCC): ICD-10-CM

## 2018-05-20 DIAGNOSIS — J43.1 PANLOBULAR EMPHYSEMA (HCC): Primary | Chronic | ICD-10-CM

## 2018-05-20 DIAGNOSIS — J18.9 PNEUMONIA OF BOTH LUNGS DUE TO INFECTIOUS ORGANISM, UNSPECIFIED PART OF LUNG: ICD-10-CM

## 2018-05-20 LAB
ALBUMIN SERPL-MCNC: 4.1 G/DL (ref 3.5–5)
ALBUMIN/GLOB SERPL: 1.5 G/DL (ref 1.1–2.5)
ALP SERPL-CCNC: 83 U/L (ref 24–120)
ALT SERPL W P-5'-P-CCNC: 86 U/L (ref 0–54)
ANION GAP SERPL CALCULATED.3IONS-SCNC: 5 MMOL/L (ref 4–13)
AST SERPL-CCNC: 55 U/L (ref 7–45)
BASOPHILS # BLD AUTO: 0.05 10*3/MM3 (ref 0–0.2)
BASOPHILS NFR BLD AUTO: 0.4 % (ref 0–2)
BILIRUB SERPL-MCNC: 0.6 MG/DL (ref 0.1–1)
BUN BLD-MCNC: 12 MG/DL (ref 5–21)
BUN/CREAT SERPL: 20.7 (ref 7–25)
CALCIUM SPEC-SCNC: 8.7 MG/DL (ref 8.4–10.4)
CHLORIDE SERPL-SCNC: 91 MMOL/L (ref 98–110)
CO2 SERPL-SCNC: 37 MMOL/L (ref 24–31)
CREAT BLD-MCNC: 0.58 MG/DL (ref 0.5–1.4)
D DIMER PPP FEU-MCNC: 1.11 MG/L (FEU) (ref 0–0.5)
D-LACTATE SERPL-SCNC: 0.7 MMOL/L (ref 0.5–2)
DEPRECATED RDW RBC AUTO: 45.6 FL (ref 40–54)
EOSINOPHIL # BLD AUTO: 0.04 10*3/MM3 (ref 0–0.7)
EOSINOPHIL NFR BLD AUTO: 0.3 % (ref 0–4)
ERYTHROCYTE [DISTWIDTH] IN BLOOD BY AUTOMATED COUNT: 13.1 % (ref 12–15)
GFR SERPL CREATININE-BSD FRML MDRD: 101 ML/MIN/1.73
GLOBULIN UR ELPH-MCNC: 2.8 GM/DL
GLUCOSE BLD-MCNC: 99 MG/DL (ref 70–100)
HCT VFR BLD AUTO: 43.6 % (ref 37–47)
HGB BLD-MCNC: 14.2 G/DL (ref 12–16)
HOLD SPECIMEN: NORMAL
HOLD SPECIMEN: NORMAL
IMM GRANULOCYTES # BLD: 0.06 10*3/MM3 (ref 0–0.03)
IMM GRANULOCYTES NFR BLD: 0.5 % (ref 0–5)
LYMPHOCYTES # BLD AUTO: 1.41 10*3/MM3 (ref 0.72–4.86)
LYMPHOCYTES NFR BLD AUTO: 12.3 % (ref 15–45)
MCH RBC QN AUTO: 31 PG (ref 28–32)
MCHC RBC AUTO-ENTMCNC: 32.6 G/DL (ref 33–36)
MCV RBC AUTO: 95.2 FL (ref 82–98)
MONOCYTES # BLD AUTO: 0.73 10*3/MM3 (ref 0.19–1.3)
MONOCYTES NFR BLD AUTO: 6.4 % (ref 4–12)
NEUTROPHILS # BLD AUTO: 9.17 10*3/MM3 (ref 1.87–8.4)
NEUTROPHILS NFR BLD AUTO: 80.1 % (ref 39–78)
NRBC BLD MANUAL-RTO: 0 /100 WBC (ref 0–0)
NT-PROBNP SERPL-MCNC: 592 PG/ML (ref 0–1800)
PLATELET # BLD AUTO: 294 10*3/MM3 (ref 130–400)
PMV BLD AUTO: 8.7 FL (ref 6–12)
POTASSIUM BLD-SCNC: 5 MMOL/L (ref 3.5–5.3)
PROT SERPL-MCNC: 6.9 G/DL (ref 6.3–8.7)
RBC # BLD AUTO: 4.58 10*6/MM3 (ref 4.2–5.4)
SODIUM BLD-SCNC: 133 MMOL/L (ref 135–145)
TROPONIN I SERPL-MCNC: <0.012 NG/ML (ref 0–0.03)
WBC NRBC COR # BLD: 11.46 10*3/MM3 (ref 4.8–10.8)
WHOLE BLOOD HOLD SPECIMEN: NORMAL
WHOLE BLOOD HOLD SPECIMEN: NORMAL

## 2018-05-20 PROCEDURE — 83880 ASSAY OF NATRIURETIC PEPTIDE: CPT | Performed by: EMERGENCY MEDICINE

## 2018-05-20 PROCEDURE — 25010000002 FUROSEMIDE PER 20 MG: Performed by: EMERGENCY MEDICINE

## 2018-05-20 PROCEDURE — 93010 ELECTROCARDIOGRAM REPORT: CPT | Performed by: INTERNAL MEDICINE

## 2018-05-20 PROCEDURE — 85379 FIBRIN DEGRADATION QUANT: CPT | Performed by: EMERGENCY MEDICINE

## 2018-05-20 PROCEDURE — 25010000002 PIPERACILLIN SOD-TAZOBACTAM PER 1 G: Performed by: FAMILY MEDICINE

## 2018-05-20 PROCEDURE — 99285 EMERGENCY DEPT VISIT HI MDM: CPT

## 2018-05-20 PROCEDURE — 85025 COMPLETE CBC W/AUTO DIFF WBC: CPT | Performed by: EMERGENCY MEDICINE

## 2018-05-20 PROCEDURE — 84484 ASSAY OF TROPONIN QUANT: CPT | Performed by: EMERGENCY MEDICINE

## 2018-05-20 PROCEDURE — 93005 ELECTROCARDIOGRAM TRACING: CPT | Performed by: EMERGENCY MEDICINE

## 2018-05-20 PROCEDURE — 71275 CT ANGIOGRAPHY CHEST: CPT

## 2018-05-20 PROCEDURE — 87040 BLOOD CULTURE FOR BACTERIA: CPT | Performed by: EMERGENCY MEDICINE

## 2018-05-20 PROCEDURE — 0 IOPAMIDOL PER 1 ML: Performed by: EMERGENCY MEDICINE

## 2018-05-20 PROCEDURE — 25010000002 PIPERACILLIN SOD-TAZOBACTAM PER 1 G: Performed by: EMERGENCY MEDICINE

## 2018-05-20 PROCEDURE — 71045 X-RAY EXAM CHEST 1 VIEW: CPT

## 2018-05-20 PROCEDURE — 80053 COMPREHEN METABOLIC PANEL: CPT | Performed by: EMERGENCY MEDICINE

## 2018-05-20 PROCEDURE — 25010000002 LEVOFLOXACIN PER 250 MG: Performed by: EMERGENCY MEDICINE

## 2018-05-20 PROCEDURE — 83605 ASSAY OF LACTIC ACID: CPT | Performed by: EMERGENCY MEDICINE

## 2018-05-20 RX ORDER — SODIUM CHLORIDE 0.9 % (FLUSH) 0.9 %
10 SYRINGE (ML) INJECTION AS NEEDED
Status: DISCONTINUED | OUTPATIENT
Start: 2018-05-20 | End: 2018-05-23 | Stop reason: HOSPADM

## 2018-05-20 RX ORDER — FUROSEMIDE 20 MG/1
20 TABLET ORAL DAILY PRN
COMMUNITY
End: 2018-06-21

## 2018-05-20 RX ORDER — FUROSEMIDE 10 MG/ML
40 INJECTION INTRAMUSCULAR; INTRAVENOUS ONCE
Status: COMPLETED | OUTPATIENT
Start: 2018-05-20 | End: 2018-05-20

## 2018-05-20 RX ORDER — LOVASTATIN 20 MG/1
10 TABLET ORAL EVERY OTHER DAY
COMMUNITY
End: 2018-06-21

## 2018-05-20 RX ORDER — DILTIAZEM HYDROCHLORIDE 60 MG/1
60 TABLET, FILM COATED ORAL 3 TIMES DAILY
COMMUNITY
End: 2018-05-23 | Stop reason: HOSPADM

## 2018-05-20 RX ORDER — ATORVASTATIN CALCIUM 10 MG/1
10 TABLET, FILM COATED ORAL NIGHTLY
Status: DISCONTINUED | OUTPATIENT
Start: 2018-05-20 | End: 2018-05-23 | Stop reason: HOSPADM

## 2018-05-20 RX ORDER — LEVOFLOXACIN 5 MG/ML
750 INJECTION, SOLUTION INTRAVENOUS ONCE
Status: COMPLETED | OUTPATIENT
Start: 2018-05-20 | End: 2018-05-20

## 2018-05-20 RX ORDER — LEVOFLOXACIN 5 MG/ML
500 INJECTION, SOLUTION INTRAVENOUS EVERY 24 HOURS
Status: DISCONTINUED | OUTPATIENT
Start: 2018-05-20 | End: 2018-05-20

## 2018-05-20 RX ORDER — LOVASTATIN 20 MG/1
20 TABLET ORAL EVERY OTHER DAY
COMMUNITY

## 2018-05-20 RX ORDER — LEVOFLOXACIN 5 MG/ML
500 INJECTION, SOLUTION INTRAVENOUS EVERY 24 HOURS
Status: DISCONTINUED | OUTPATIENT
Start: 2018-05-21 | End: 2018-05-21

## 2018-05-20 RX ORDER — ACYCLOVIR 400 MG/1
400 TABLET ORAL DAILY
COMMUNITY
End: 2019-11-19 | Stop reason: ALTCHOICE

## 2018-05-20 RX ADMIN — ATORVASTATIN CALCIUM 10 MG: 10 TABLET, FILM COATED ORAL at 20:10

## 2018-05-20 RX ADMIN — IOPAMIDOL 100 ML: 755 INJECTION, SOLUTION INTRAVENOUS at 11:51

## 2018-05-20 RX ADMIN — LEVOFLOXACIN 750 MG: 750 INJECTION, SOLUTION INTRAVENOUS at 15:00

## 2018-05-20 RX ADMIN — APIXABAN 5 MG: 5 TABLET, FILM COATED ORAL at 20:10

## 2018-05-20 RX ADMIN — DILTIAZEM HYDROCHLORIDE 5 MG/HR: 5 INJECTION INTRAVENOUS at 10:25

## 2018-05-20 RX ADMIN — TAZOBACTAM SODIUM AND PIPERACILLIN SODIUM 4.5 G: 500; 4 INJECTION, SOLUTION INTRAVENOUS at 13:11

## 2018-05-20 RX ADMIN — TAZOBACTAM SODIUM AND PIPERACILLIN SODIUM 3.38 G: 375; 3 INJECTION, SOLUTION INTRAVENOUS at 18:56

## 2018-05-20 RX ADMIN — FUROSEMIDE 40 MG: 10 INJECTION, SOLUTION INTRAMUSCULAR; INTRAVENOUS at 13:07

## 2018-05-20 NOTE — PLAN OF CARE
Problem: Arrhythmia/Dysrhythmia (Symptomatic) (Adult)  Goal: Signs and Symptoms of Listed Potential Problems Will be Absent, Minimized or Managed (Arrhythmia/Dysrhythmia)  Outcome: Ongoing (interventions implemented as appropriate)      Problem: Infection, Risk/Actual (Adult)  Goal: Identify Related Risk Factors and Signs and Symptoms  Outcome: Ongoing (interventions implemented as appropriate)    Goal: Infection Prevention/Resolution  Outcome: Ongoing (interventions implemented as appropriate)      Problem: Patient Care Overview  Goal: Plan of Care Review  Outcome: Ongoing (interventions implemented as appropriate)   05/20/18 2175   Coping/Psychosocial   Plan of Care Reviewed With patient   Plan of Care Review   Progress no change   OTHER   Outcome Summary Pt admit from er with afib rvr and pneumonia. ABx given. drip going at 5ml/hr. cont to monitor.      Goal: Individualization and Mutuality  Outcome: Ongoing (interventions implemented as appropriate)    Goal: Discharge Needs Assessment  Outcome: Ongoing (interventions implemented as appropriate)    Goal: Interprofessional Rounds/Family Conf  Outcome: Ongoing (interventions implemented as appropriate)

## 2018-05-20 NOTE — ED PROVIDER NOTES
Subjective   Patient is a 78-year-old female who presents to the ER with edema and shortness of breath.  Patient was admitted here from May 17 to the 18th with new onset atrial fibrillation.  Patient was discharged home on Eliquis and diltiazem.  Patient states she has been taking her medication as prescribed.  Patient states that she has been having some bilateral lower extremity edema for the last month but it is worsened significantly over the past day.  Patient also complains of shortness of breath and a nonproductive cough.  Patient denies any fever, chest pain, abdominal pain, nausea vomiting diarrhea, urinary changes, neurological changes.  Patient has no other concerns.            Review of Systems   Constitutional: Negative.    HENT: Negative.    Eyes: Negative.    Respiratory: Positive for cough and shortness of breath.    Cardiovascular: Positive for leg swelling.   Gastrointestinal: Negative.    Endocrine: Negative.    Genitourinary: Negative.    Musculoskeletal: Negative.    Skin: Negative.    Allergic/Immunologic: Negative.    Neurological: Negative.    Hematological: Negative.    Psychiatric/Behavioral: Negative.    All other systems reviewed and are negative.      Past Medical History:   Diagnosis Date   • Atrial fibrillation    • COPD (chronic obstructive pulmonary disease)    • Hypercholesteremia    • Smoker        No Known Allergies    Past Surgical History:   Procedure Laterality Date   • BLADDER SURGERY     • HYSTERECTOMY         History reviewed. No pertinent family history.    Social History     Social History   • Marital status:      Social History Main Topics   • Smoking status: Current Every Day Smoker     Packs/day: 0.50   • Alcohol use No   • Drug use: No     Other Topics Concern   • Not on file           Objective   Physical Exam   Constitutional: She is oriented to person, place, and time. She appears well-developed and well-nourished.   HENT:   Head: Normocephalic and  atraumatic.   Eyes: Conjunctivae are normal. Pupils are equal, round, and reactive to light.   Neck: Normal range of motion.   Cardiovascular: Normal heart sounds.  An irregularly irregular rhythm present. Tachycardia present.    Pulmonary/Chest: Effort normal. She has rhonchi. She has rales.   Abdominal: Soft. There is no tenderness.   Musculoskeletal: Normal range of motion. She exhibits no deformity.   Neurological: She is alert and oriented to person, place, and time. She has normal strength.   Skin: Skin is warm.   1+ pitting edema bilateral lower extremities   Psychiatric: She has a normal mood and affect. Her behavior is normal.   Nursing note and vitals reviewed.      Procedures           ED Course        EKG: Atrial fibrillation with a rate of 116 with RVR, no ST elevation    She was given a diltiazem bolus and drip.  Heart rate improved with treatment.    Lab Results (last 24 hours)     Procedure Component Value Units Date/Time    BNP [[id number]]  (Normal) Collected:  05/20/18 1007    Specimen:  Blood Updated:  05/20/18 1052     proBNP 592.0 pg/mL     CBC Auto Differential [[id number]]  (Abnormal) Collected:  05/20/18 1007    Specimen:  Blood Updated:  05/20/18 1035     WBC 11.46 (H) 10*3/mm3      RBC 4.58 10*6/mm3      Hemoglobin 14.2 g/dL      Hematocrit 43.6 %      MCV 95.2 fL      MCH 31.0 pg      MCHC 32.6 (L) g/dL      RDW 13.1 %      RDW-SD 45.6 fl      MPV 8.7 fL      Platelets 294 10*3/mm3      Neutrophil % 80.1 (H) %      Lymphocyte % 12.3 (L) %      Monocyte % 6.4 %      Eosinophil % 0.3 %      Basophil % 0.4 %      Immature Grans % 0.5 %      Neutrophils, Absolute 9.17 (H) 10*3/mm3      Lymphocytes, Absolute 1.41 10*3/mm3      Monocytes, Absolute 0.73 10*3/mm3      Eosinophils, Absolute 0.04 10*3/mm3      Basophils, Absolute 0.05 10*3/mm3      Immature Grans, Absolute 0.06 (H) 10*3/mm3      nRBC 0.0 /100 WBC     Comprehensive Metabolic Panel [[id number]]  (Abnormal) Collected:  05/20/18 1007     Specimen:  Blood Updated:  05/20/18 1053     Glucose 99 mg/dL      BUN 12 mg/dL      Comment: Specimen hemolyzed. Results may be affected.        Creatinine 0.58 mg/dL      Sodium 133 (L) mmol/L      Potassium 5.0 mmol/L      Comment: Specimen hemolyzed.  Results may be affected.        Chloride 91 (L) mmol/L      CO2 37.0 (H) mmol/L      Calcium 8.7 mg/dL      Total Protein 6.9 g/dL      Albumin 4.10 g/dL      ALT (SGPT) 86 (H) U/L      Comment: Specimen hemolyzed.  Results may be affected.        AST (SGOT) 55 (H) U/L      Comment: Specimen hemolyzed.  Results may be affected.        Alkaline Phosphatase 83 U/L      Comment: Specimen hemolyzed. Results may be affected.        Total Bilirubin 0.6 mg/dL      eGFR Non African Amer 101 mL/min/1.73      Globulin 2.8 gm/dL      A/G Ratio 1.5 g/dL      BUN/Creatinine Ratio 20.7     Anion Gap 5.0 mmol/L     Narrative:       The MDRD GFR formula is only valid for adults with stable renal function between ages 18 and 70.    D-dimer, Quantitative [[id number]]  (Abnormal) Collected:  05/20/18 1007    Specimen:  Blood Updated:  05/20/18 1040     D-Dimer, Quantitative 1.11 (H) mg/L (FEU)     Narrative:       Reference Range is 0-0.50 mg/L FEU. However, results <0.50 mg/L FEU tends to rule out DVT or PE. Results >0.50 mg/L FEU are not useful in predicting absence or presence of DVT or PE.    Troponin [[id number]]  (Normal) Collected:  05/20/18 1007    Specimen:  Blood Updated:  05/20/18 1052     Troponin I <0.012 ng/mL         Labs Showed leukocytosis, elevated d-dimer and mildly elevated LFTs.    CT Angiogram Chest With Contrast   Final Result   1. No evidence of pulmonary embolus.   2. Left lower lobe consolidation and pleural effusion, concerning for   pneumonia and parapneumonic effusion.   3. Medial right middle lobe opacity, may also reflect focus of   infection. Small right pleural effusion.   4. Cardiomegaly with findings of right heart dysfunction.   5. Right apical focal  nodularity. Again, this may be part of the pleural   thickening. However, outpatient PET/CT after resolution of current   examination is recommended to assess metabolic activity.           This report was finalized on 05/20/2018 12:24 by Dr. Rosalina Ford MD.      XR Chest 1 View   Final Result   Impression:       Small left pleural effusion associated opacity. Consider infection in   the proper cortical setting.       This report was finalized on 05/20/2018 10:28 by Dr. Rosalina Ford MD.        Imaging showed a left lower lobe consolidation and pleural effusion consistent with pneumonia with a parapneumonic effusion, right middle lobe opacity concerning for pneumonia, cardiomegaly, and a nodule in the right apex.  Patient needs further workup for the nodule on a nonemergent basis.  Patient was given a dose of Lasix for her swelling.  Since imaging showed pneumonia patient was treated with vancomycin, Zosyn and Levaquin.  She has had a cough, shortness of breath and leukocytosis but she does deny fever.  However patient has had that recent hospitalization.  Patient was then admitted to Dr. Javier for A. fib with RVR, pneumonia, and lower extremity edema.        MDM      Final diagnoses:   Atrial fibrillation with RVR   Lower extremity edema   Pneumonia of both lungs due to infectious organism, unspecified part of lung            Rosa Strong MD  05/20/18 0192

## 2018-05-21 PROCEDURE — 99223 1ST HOSP IP/OBS HIGH 75: CPT | Performed by: INTERNAL MEDICINE

## 2018-05-21 PROCEDURE — 25010000002 PIPERACILLIN SOD-TAZOBACTAM PER 1 G: Performed by: FAMILY MEDICINE

## 2018-05-21 PROCEDURE — 25010000002 LEVOFLOXACIN PER 250 MG: Performed by: FAMILY MEDICINE

## 2018-05-21 PROCEDURE — 25010000002 FUROSEMIDE PER 20 MG: Performed by: FAMILY MEDICINE

## 2018-05-21 RX ORDER — DILTIAZEM HYDROCHLORIDE 180 MG/1
360 CAPSULE, COATED, EXTENDED RELEASE ORAL
Status: DISCONTINUED | OUTPATIENT
Start: 2018-05-22 | End: 2018-05-23 | Stop reason: HOSPADM

## 2018-05-21 RX ORDER — LEVOFLOXACIN 500 MG/1
500 TABLET, FILM COATED ORAL
Status: DISCONTINUED | OUTPATIENT
Start: 2018-05-22 | End: 2018-05-23 | Stop reason: HOSPADM

## 2018-05-21 RX ORDER — SODIUM CHLORIDE 0.9 % (FLUSH) 0.9 %
1-10 SYRINGE (ML) INJECTION AS NEEDED
Status: DISCONTINUED | OUTPATIENT
Start: 2018-05-21 | End: 2018-05-23 | Stop reason: HOSPADM

## 2018-05-21 RX ORDER — FUROSEMIDE 10 MG/ML
40 INJECTION INTRAMUSCULAR; INTRAVENOUS ONCE
Status: COMPLETED | OUTPATIENT
Start: 2018-05-21 | End: 2018-05-21

## 2018-05-21 RX ORDER — FUROSEMIDE 10 MG/ML
40 INJECTION INTRAMUSCULAR; INTRAVENOUS ONCE
Status: DISCONTINUED | OUTPATIENT
Start: 2018-05-21 | End: 2018-05-21

## 2018-05-21 RX ORDER — DILTIAZEM HYDROCHLORIDE 240 MG/1
240 CAPSULE, COATED, EXTENDED RELEASE ORAL
Status: DISCONTINUED | OUTPATIENT
Start: 2018-05-21 | End: 2018-05-21

## 2018-05-21 RX ADMIN — APIXABAN 5 MG: 5 TABLET, FILM COATED ORAL at 09:02

## 2018-05-21 RX ADMIN — DILTIAZEM HYDROCHLORIDE 240 MG: 240 CAPSULE, EXTENDED RELEASE ORAL at 10:42

## 2018-05-21 RX ADMIN — DILTIAZEM HYDROCHLORIDE 5 MG/HR: 5 INJECTION INTRAVENOUS at 18:04

## 2018-05-21 RX ADMIN — LEVOFLOXACIN 500 MG: 5 INJECTION, SOLUTION INTRAVENOUS at 14:08

## 2018-05-21 RX ADMIN — ATORVASTATIN CALCIUM 10 MG: 10 TABLET, FILM COATED ORAL at 20:54

## 2018-05-21 RX ADMIN — TAZOBACTAM SODIUM AND PIPERACILLIN SODIUM 3.38 G: 375; 3 INJECTION, SOLUTION INTRAVENOUS at 10:43

## 2018-05-21 RX ADMIN — APIXABAN 5 MG: 5 TABLET, FILM COATED ORAL at 20:54

## 2018-05-21 RX ADMIN — DILTIAZEM HYDROCHLORIDE 5 MG/HR: 5 INJECTION INTRAVENOUS at 07:05

## 2018-05-21 RX ADMIN — FUROSEMIDE 40 MG: 10 INJECTION, SOLUTION INTRAMUSCULAR; INTRAVENOUS at 09:20

## 2018-05-21 RX ADMIN — TAZOBACTAM SODIUM AND PIPERACILLIN SODIUM 3.38 G: 375; 3 INJECTION, SOLUTION INTRAVENOUS at 03:00

## 2018-05-21 NOTE — PROGRESS NOTES
Discharge Planning Assessment  [facility]     Patient Name: Merari Gaspar  MRN: [id number]  Today's Date: 5/21/2018    Admit Date: 5/20/2018          Discharge Needs Assessment     Row Name 05/21/18 1210       Living Environment    Lives With spouse    Current Living Arrangements home/apartment/condo    Primary Care Provided by self    Provides Primary Care For no one    Family Caregiver if Needed spouse    Quality of Family Relationships helpful;involved    Able to Return to Prior Arrangements yes       Resource/Environmental Concerns    Resource/Environmental Concerns none    Transportation Concerns car, none       Transition Planning    Patient/Family Anticipates Transition to home    Patient/Family Anticipated Services at Transition none    Transportation Anticipated family or friend will provide       Discharge Needs Assessment    Readmission Within the Last 30 Days no previous admission in last 30 days    Concerns to be Addressed no discharge needs identified;denies needs/concerns at this time    Equipment Currently Used at Home none    Anticipated Changes Related to Illness none    Equipment Needed After Discharge none            Discharge Plan     Row Name 05/21/18 1212       Plan    Plan PT resides at home with spouse and plans to dc to the same with no known needs. PT is declining HH at this time. Will follow.     Patient/Family in Agreement with Plan yes        Destination     No service coordination in this encounter.      Durable Medical Equipment     No service coordination in this encounter.      Dialysis/Infusion     No service coordination in this encounter.      Home Medical Care     No service coordination in this encounter.      Social Care     No service coordination in this encounter.                Demographic Summary    No documentation.           Functional Status    No documentation.           Psychosocial    No documentation.           Abuse/Neglect    No documentation.           Legal     No documentation.           Substance Abuse    No documentation.           Patient Forms    No documentation.         Sammie Patterson MSW

## 2018-05-21 NOTE — PLAN OF CARE
Problem: Patient Care Overview  Goal: Discharge Needs Assessment  Outcome: Ongoing (interventions implemented as appropriate)    Goal: Interprofessional Rounds/Family Conf  Outcome: Ongoing (interventions implemented as appropriate)

## 2018-05-21 NOTE — CONSULTS
Patient Care Team:  Nash Javier MD as PCP - General (Family Medicine)  Sohail Strong MD as Consulting Physician (Cardiology)  Nash Javier MD  REASON FOR REFERRAL: atrial fibrillation   Chief complaint: generalized edema, shortness of breath     Subjective     Patient is a 78 y.o. female presents with shortness of breath, generalized edema (facial , abdominal, and lower extremity) , and dry cough. She states her symptoms worsened suddenly over the past 1-2 days, following her recent discharge. She also reports a 9 lb weight gain during this time frame as well. She denies chest pain, fever or chills. She admits occasional palpitations. EKG revealed afib, no acute STT changes. BNP and troponin are normal. CTA ruled out PE but revealed possible pneumonia and right apical focal nodularity. She is being treated for her pneumonia per the primary team. She was given a dose of IV lasix today as well, with reported improvement in her edema and dyspnea.     She is currently on 5 mg/hr IV cardizem, with HR 90s-120s. She has not been taking Eliquis at home due to cost. She has been taking 60 mg TID of short acting cardizem at home.    Review of Systems   Review of Systems   Constitutional: Negative for diaphoresis, fatigue, fever and unexpected weight change.   HENT: Negative for nosebleeds.    Respiratory: Positive for cough and shortness of breath (and orthopnea ). Negative for apnea, chest tightness and wheezing.    Cardiovascular: Positive for palpitations and leg swelling. Negative for chest pain.   Gastrointestinal: Negative for abdominal distention, nausea and vomiting.   Genitourinary: Negative for hematuria.   Musculoskeletal: Negative for gait problem.   Skin: Negative for color change.   Neurological: Negative for dizziness, syncope, weakness and light-headedness.       History  Past Medical History:   Diagnosis Date   • Atrial fibrillation    • COPD (chronic obstructive pulmonary disease)    •  Hypercholesteremia    • Smoker      Past Surgical History:   Procedure Laterality Date   • BLADDER SURGERY     • HYSTERECTOMY       History reviewed. No pertinent family history.  Social History   Substance Use Topics   • Smoking status: Current Every Day Smoker     Packs/day: 0.50   • Smokeless tobacco: Not on file   • Alcohol use No     Prescriptions Prior to Admission   Medication Sig Dispense Refill Last Dose   • acyclovir (ZOVIRAX) 400 MG tablet Take 400 mg by mouth Daily. Take no more than 5 doses a day.   5/20/2018 at 0800   • apixaban (ELIQUIS) 5 MG tablet tablet Take 1 tablet by mouth Every 12 (Twelve) Hours. 60 tablet 2 5/20/2018 at 0800   • coenzyme Q10 100 MG capsule Take 100 mg by mouth Every Night.   5/19/2018 at 2000   • diltiaZEM (CARDIZEM) 60 MG tablet Take 60 mg by mouth 3 (Three) Times a Day.   5/20/2018 at 0800   • furosemide (LASIX) 20 MG tablet Take 20 mg by mouth Daily As Needed (swelling).   Past Month at Unknown time   • lovastatin (MEVACOR) 20 MG tablet Take 20 mg by mouth Every Other Day. Alternate half-tablet (10mg) dose every other day.   5/19/2018 at 2000   • lovastatin (MEVACOR) 20 MG tablet Take 10 mg by mouth Every Other Day. Alternate full tablet (20mg) dose every other day.   5/18/2018 at 2000   • Multiple Vitamins-Minerals (MULTIVITAL PO) Take 1 tablet by mouth Daily.   5/20/2018 at 0800       Current Facility-Administered Medications:   •  apixaban (ELIQUIS) tablet 5 mg, 5 mg, Oral, Q12H, Nash Medrano MD, 5 mg at 05/21/18 0902  •  atorvastatin (LIPITOR) tablet 10 mg, 10 mg, Oral, Nightly, Nash Medrano MD, 10 mg at 05/20/18 2010  •  diltiaZEM (CARDIZEM) 125 mg in sodium chloride 0.9 % 125 mL (1 mg/mL) infusion, 5-15 mg/hr, Intravenous, Continuous, Rosa Strong MD, Last Rate: 5 mL/hr at 05/21/18 0705, 5 mg/hr at 05/21/18 0705  •  levoFLOXacin (LEVAQUIN) 500 mg/100 mL D5W (premix) (LEVAQUIN) 500 mg, 500 mg, Intravenous, Q24H, Nash Javier MD  •   piperacillin-tazobactam (ZOSYN) 3.375 g in iso-osmotic dextrose 50 ml (premix), 3.375 g, Intravenous, Q8H, Nash Javier MD, 3.375 g at 05/21/18 0300  •  sodium chloride 0.9 % flush 1-10 mL, 1-10 mL, Intravenous, PRN, Nash Javier MD  •  sodium chloride 0.9 % flush 10 mL, 10 mL, Intravenous, PRN, Rosa Strong MD  •  Insert peripheral IV, , , Once **AND** sodium chloride 0.9 % flush 10 mL, 10 mL, Intravenous, PRN, Rosa Strong MD  Allergies:  Statins    Objective     Vital Signs  Temp:  [97.5 °F (36.4 °C)-98.6 °F (37 °C)] 97.6 °F (36.4 °C)  Heart Rate:  [] 101  Resp:  [18-20] 18  BP: ()/(46-73) 125/64    Physical Exam:   Physical Exam   Constitutional: She is oriented to person, place, and time. She appears well-developed and well-nourished. No distress.   HENT:   Head: Normocephalic and atraumatic.   Eyes: Pupils are equal, round, and reactive to light.   Neck: Normal range of motion. Neck supple. No JVD present. No thyromegaly present.   Cardiovascular: Normal heart sounds and intact distal pulses.  An irregular rhythm present. Tachycardia present.  Exam reveals no gallop and no friction rub.    No murmur heard.  Pulmonary/Chest: Effort normal. No respiratory distress. She has decreased breath sounds (middle and upper lobes, with few scattered wheezes ). She has wheezes. She has rales (bases). She exhibits no tenderness.   Abdominal: Soft. Bowel sounds are normal. She exhibits no distension. There is no tenderness.   Musculoskeletal: Normal range of motion. She exhibits no edema.   Neurological: She is alert and oriented to person, place, and time. No cranial nerve deficit.   Skin: Skin is warm and dry. She is not diaphoretic.   Psychiatric: She has a normal mood and affect. Her behavior is normal.     Results Review:     Lab Results (last 72 hours)     Procedure Component Value Units Date/Time    Blood Culture - Blood, [[id number]]  (Normal) Collected:  05/20/18 1300    Specimen:   Blood from Hand, Left Updated:  05/21/18 0130     Blood Culture No growth at less than 24 hours    Blood Culture - Blood, [[id number]]  (Normal) Collected:  05/20/18 1303    Specimen:  Blood from Arm, Right Updated:  05/21/18 0130     Blood Culture No growth at less than 24 hours    Lactic Acid, Plasma [[id number]]  (Normal) Collected:  05/20/18 1303    Specimen:  Blood Updated:  05/20/18 1319     Lactate 0.7 mmol/L     [place] Draw [[id number]] Collected:  05/20/18 1007    Specimen:  Blood Updated:  05/20/18 1115    Narrative:       The following orders were created for panel order [place] [facility].  Procedure                               Abnormality         Status                     ---------                               -----------         ------                     Light Blue Top[[id number]]                                   Final result               Green Top (Gel)[[id number]]                                  Final result               Lavender Top[[id number]]                                     Final result               Red Top[[id number]]                                          Final result                 Please view results for these tests on the individual orders.    Lavender Top [[id number]] Collected:  05/20/18 1007    Specimen:  Blood Updated:  05/20/18 1115     Extra Tube hold for add-on     Comment: Auto resulted       Red Top [[id number]] Collected:  05/20/18 1007    Specimen:  Blood Updated:  05/20/18 1115     Extra Tube Hold for add-ons.     Comment: Auto resulted.       Light Blue Top [[id number]] Collected:  05/20/18 1007    Specimen:  Blood Updated:  05/20/18 1115     Extra Tube hold for add-on     Comment: Auto resulted       Green Top (Gel) [[id number]] Collected:  05/20/18 1007    Specimen:  Blood Updated:  05/20/18 1115     Extra Tube Hold for add-ons.     Comment: Auto resulted.       Comprehensive Metabolic Panel [[id number]]  (Abnormal) Collected:  05/20/18 1007    Specimen:  Blood Updated:   05/20/18 1053     Glucose 99 mg/dL      BUN 12 mg/dL      Comment: Specimen hemolyzed. Results may be affected.        Creatinine 0.58 mg/dL      Sodium 133 (L) mmol/L      Potassium 5.0 mmol/L      Comment: Specimen hemolyzed.  Results may be affected.        Chloride 91 (L) mmol/L      CO2 37.0 (H) mmol/L      Calcium 8.7 mg/dL      Total Protein 6.9 g/dL      Albumin 4.10 g/dL      ALT (SGPT) 86 (H) U/L      Comment: Specimen hemolyzed.  Results may be affected.        AST (SGOT) 55 (H) U/L      Comment: Specimen hemolyzed.  Results may be affected.        Alkaline Phosphatase 83 U/L      Comment: Specimen hemolyzed. Results may be affected.        Total Bilirubin 0.6 mg/dL      eGFR Non African Amer 101 mL/min/1.73      Globulin 2.8 gm/dL      A/G Ratio 1.5 g/dL      BUN/Creatinine Ratio 20.7     Anion Gap 5.0 mmol/L     Narrative:       The MDRD GFR formula is only valid for adults with stable renal function between ages 18 and 70.    BNP [[id number]]  (Normal) Collected:  05/20/18 1007    Specimen:  Blood Updated:  05/20/18 1052     proBNP 592.0 pg/mL     Troponin [[id number]]  (Normal) Collected:  05/20/18 1007    Specimen:  Blood Updated:  05/20/18 1052     Troponin I <0.012 ng/mL     D-dimer, Quantitative [[id number]]  (Abnormal) Collected:  05/20/18 1007    Specimen:  Blood Updated:  05/20/18 1040     D-Dimer, Quantitative 1.11 (H) mg/L (FEU)     Narrative:       Reference Range is 0-0.50 mg/L FEU. However, results <0.50 mg/L FEU tends to rule out DVT or PE. Results >0.50 mg/L FEU are not useful in predicting absence or presence of DVT or PE.    CBC Auto Differential [[id number]]  (Abnormal) Collected:  05/20/18 1007    Specimen:  Blood Updated:  05/20/18 1035     WBC 11.46 (H) 10*3/mm3      RBC 4.58 10*6/mm3      Hemoglobin 14.2 g/dL      Hematocrit 43.6 %      MCV 95.2 fL      MCH 31.0 pg      MCHC 32.6 (L) g/dL      RDW 13.1 %      RDW-SD 45.6 fl      MPV 8.7 fL      Platelets 294 10*3/mm3      Neutrophil %  80.1 (H) %      Lymphocyte % 12.3 (L) %      Monocyte % 6.4 %      Eosinophil % 0.3 %      Basophil % 0.4 %      Immature Grans % 0.5 %      Neutrophils, Absolute 9.17 (H) 10*3/mm3      Lymphocytes, Absolute 1.41 10*3/mm3      Monocytes, Absolute 0.73 10*3/mm3      Eosinophils, Absolute 0.04 10*3/mm3      Basophils, Absolute 0.05 10*3/mm3      Immature Grans, Absolute 0.06 (H) 10*3/mm3      nRBC 0.0 /100 WBC         -800 cc net since admission     Assessment/Plan     -Persistent atrial fibrillation- rapid ventricular response this admission   -Probable pneumonia and right apical focal nodularity per CTA   -Acute congestive heart failure, diastolic with RV dysfunction as well   -Essential hypertension- controlled   -Hyperlipidemia-  Controlled with statin  -Recent tobacco abuse- quit smoking 4 days ago   -Hyponatremia   -Mild tansaminitis     Plan-  Continue anticoagulation with Eliquis this admission   Cardizem 240 mg CD daily- start now and wean gtt off as tolerated and continue to monitor telemetry   Continue diuresis with IV lasix - 2nd dose of 40 mg IV today   Monitor volume status closely   Antibiotics/pneumonia treatment per primary team.  Discussed with Dr. Strong    I discussed the patients findings and my recommendations with patient and consulting provider.     Concha Vargas, APRN  05/21/18  10:01 AM

## 2018-05-21 NOTE — H&P
History and Physical       CHIEF COMPLAINT:  Shortness of air    Reason for Admission:  Pneumonia    History Obtained From: Patient/    HISTORY OF PRESENT ILLNESS:      The patient is a 78 y.o. female with significant past medical history of recent admission for atrial fibrillation and rapid ventricular response who presents with cough congestion.  Patient was admitted 2-3 days ago from the office with a episode of tachycardia and shortness of air.  Her heart rate was irregular.  EKG on admission showed atrial fibrillation with a rapid ventricular response.  Chest x-ray at that time was unremarkable, however she has greater than 100 pack year smoking.  She presented back to the emergency room with cough code congestion CT of the chest showed a right middle lobe pneumonia currently admitted for antibiotic treatment and further delineation of care.  No other precipitating or relieving factors no pain associated with this episode.    Past Medical History:    Past Medical History:   Diagnosis Date   • Atrial fibrillation    • COPD (chronic obstructive pulmonary disease)    • Hypercholesteremia    • Smoker        Past Surgical History:    Past Surgical History:   Procedure Laterality Date   • BLADDER SURGERY     • HYSTERECTOMY         Medications Prior to Admission:    Prescriptions Prior to Admission   Medication Sig Dispense Refill Last Dose   • acyclovir (ZOVIRAX) 400 MG tablet Take 400 mg by mouth Daily. Take no more than 5 doses a day.   5/20/2018 at 0800   • apixaban (ELIQUIS) 5 MG tablet tablet Take 1 tablet by mouth Every 12 (Twelve) Hours. 60 tablet 2 5/20/2018 at 0800   • coenzyme Q10 100 MG capsule Take 100 mg by mouth Every Night.   5/19/2018 at 2000   • diltiaZEM (CARDIZEM) 60 MG tablet Take 60 mg by mouth 3 (Three) Times a Day.   5/20/2018 at 0800   • furosemide (LASIX) 20 MG tablet Take 20 mg by mouth Daily As Needed (swelling).   Past Month at Unknown time   • lovastatin (MEVACOR) 20 MG tablet Take  20 mg by mouth Every Other Day. Alternate half-tablet (10mg) dose every other day.   5/19/2018 at 2000   • lovastatin (MEVACOR) 20 MG tablet Take 10 mg by mouth Every Other Day. Alternate full tablet (20mg) dose every other day.   5/18/2018 at 2000   • Multiple Vitamins-Minerals (MULTIVITAL PO) Take 1 tablet by mouth Daily.   5/20/2018 at 0800       Allergies:  Statins    Social History:   Social History     Social History   • Marital status:      Spouse name: N/A   • Number of children: N/A   • Years of education: N/A     Occupational History   • Not on file.     Social History Main Topics   • Smoking status: Current Every Day Smoker     Packs/day: 0.50   • Smokeless tobacco: Not on file   • Alcohol use No   • Drug use: No   • Sexual activity: Not on file     Other Topics Concern   • Not on file     Social History Narrative   • No narrative on file       Family History:   History reviewed. No pertinent family history.    REVIEW OF SYSTEMS:    CONSTITUTIONAL:  Negative for anorexia, chills, fevers, night sweats and weight loss  EYES:  negative for eye dryness, icterus and redness  HEENT:   negative for dental problems, epistaxis, facial trauma and thrush  RESPIRATORY:  Cough, yellow sputum, dyspnea on exertion  CARDIOVASCULAR: Irregularly irregular heart rate at 140  GASTROINTESTINAL:  negative for abdominal pain, hematemesis, jaundice, melena and rectal bleeding  MUSCULOSKELETAL:  negative for muscle weakness, myalgias and neck pain  NEUROLOGICAL:   negative for dizziness, headaches, seizures, speech problems, tremors and vertigo  INTEGUMENT: negative for pruritus, rash, skin color change and skin lesion(s)       Vital Signs   Temp:  [97.5 °F (36.4 °C)-98.6 °F (37 °C)] 98.6 °F (37 °C)  Heart Rate:  [] 100  Resp:  [17-20] 19  BP: ()/(46-81) 102/48          Physical Exam:  Constitutional: The patient is oriented to person, place, and time. They appear well-developed.   HEENT:   Head: Normocephalic  and atraumatic.   Eyes: Pupils are equal, round, and reactive to light.   Neck: Neck supple without masses or carotid bruit.  Cardiovascular: Irregularly irregular at 140.    Pulmonary/Chest: End expiratory wheeze with minimal air movement  Abdominal: Soft. Bowel sounds are normal. There is  no distension or  Tenderness appreciated. There is no rebound and no guarding.   Musculoskeletal: Normal range of motion. There is  no edema or tenderness.   Neurological: Pt is alert and oriented to person, place, and time. They have normal reflexes. CN 2-12 appear grossly intact  Skin: Skin is warm and dry without significant rashes     Results Review:   I reviewed the patient's new imaging results and agree with the interpretation.    DATA:  Lab Results (last 24 hours)     Procedure Component Value Units Date/Time    Blood Culture - Blood, [[id number]]  (Normal) Collected:  05/20/18 1300    Specimen:  Blood from Hand, Left Updated:  05/21/18 0130     Blood Culture No growth at less than 24 hours    Blood Culture - Blood, [[id number]]  (Normal) Collected:  05/20/18 1303    Specimen:  Blood from Arm, Right Updated:  05/21/18 0130     Blood Culture No growth at less than 24 hours    Lactic Acid, Plasma [[id number]]  (Normal) Collected:  05/20/18 1303    Specimen:  Blood Updated:  05/20/18 1319     Lactate 0.7 mmol/L     [place] Draw [[id number]] Collected:  05/20/18 1007    Specimen:  Blood Updated:  05/20/18 1115    Narrative:       The following orders were created for panel order [place] Draw.  Procedure                               Abnormality         Status                     ---------                               -----------         ------                     Light Blue Top[[id number]]                                   Final result               Green Top (Gel)[[id number]]                                  Final result               Lavender Top[[id number]]                                     Final result               Red  Top[[id number]]                                          Final result                 Please view results for these tests on the individual orders.    Lavender Top [[id number]] Collected:  05/20/18 1007    Specimen:  Blood Updated:  05/20/18 1115     Extra Tube hold for add-on     Comment: Auto resulted       Red Top [[id number]] Collected:  05/20/18 1007    Specimen:  Blood Updated:  05/20/18 1115     Extra Tube Hold for add-ons.     Comment: Auto resulted.       Light Blue Top [[id number]] Collected:  05/20/18 1007    Specimen:  Blood Updated:  05/20/18 1115     Extra Tube hold for add-on     Comment: Auto resulted       Green Top (Gel) [[id number]] Collected:  05/20/18 1007    Specimen:  Blood Updated:  05/20/18 1115     Extra Tube Hold for add-ons.     Comment: Auto resulted.       Comprehensive Metabolic Panel [[id number]]  (Abnormal) Collected:  05/20/18 1007    Specimen:  Blood Updated:  05/20/18 1053     Glucose 99 mg/dL      BUN 12 mg/dL      Comment: Specimen hemolyzed. Results may be affected.        Creatinine 0.58 mg/dL      Sodium 133 (L) mmol/L      Potassium 5.0 mmol/L      Comment: Specimen hemolyzed.  Results may be affected.        Chloride 91 (L) mmol/L      CO2 37.0 (H) mmol/L      Calcium 8.7 mg/dL      Total Protein 6.9 g/dL      Albumin 4.10 g/dL      ALT (SGPT) 86 (H) U/L      Comment: Specimen hemolyzed.  Results may be affected.        AST (SGOT) 55 (H) U/L      Comment: Specimen hemolyzed.  Results may be affected.        Alkaline Phosphatase 83 U/L      Comment: Specimen hemolyzed. Results may be affected.        Total Bilirubin 0.6 mg/dL      eGFR Non African Amer 101 mL/min/1.73      Globulin 2.8 gm/dL      A/G Ratio 1.5 g/dL      BUN/Creatinine Ratio 20.7     Anion Gap 5.0 mmol/L     Narrative:       The MDRD GFR formula is only valid for adults with stable renal function between ages 18 and 70.    BNP [[id number]]  (Normal) Collected:  05/20/18 1007    Specimen:  Blood Updated:  05/20/18  1052     proBNP 592.0 pg/mL     Troponin [[id number]]  (Normal) Collected:  05/20/18 1007    Specimen:  Blood Updated:  05/20/18 1052     Troponin I <0.012 ng/mL     D-dimer, Quantitative [[id number]]  (Abnormal) Collected:  05/20/18 1007    Specimen:  Blood Updated:  05/20/18 1040     D-Dimer, Quantitative 1.11 (H) mg/L (FEU)     Narrative:       Reference Range is 0-0.50 mg/L FEU. However, results <0.50 mg/L FEU tends to rule out DVT or PE. Results >0.50 mg/L FEU are not useful in predicting absence or presence of DVT or PE.    CBC Auto Differential [[id number]]  (Abnormal) Collected:  05/20/18 1007    Specimen:  Blood Updated:  05/20/18 1035     WBC 11.46 (H) 10*3/mm3      RBC 4.58 10*6/mm3      Hemoglobin 14.2 g/dL      Hematocrit 43.6 %      MCV 95.2 fL      MCH 31.0 pg      MCHC 32.6 (L) g/dL      RDW 13.1 %      RDW-SD 45.6 fl      MPV 8.7 fL      Platelets 294 10*3/mm3      Neutrophil % 80.1 (H) %      Lymphocyte % 12.3 (L) %      Monocyte % 6.4 %      Eosinophil % 0.3 %      Basophil % 0.4 %      Immature Grans % 0.5 %      Neutrophils, Absolute 9.17 (H) 10*3/mm3      Lymphocytes, Absolute 1.41 10*3/mm3      Monocytes, Absolute 0.73 10*3/mm3      Eosinophils, Absolute 0.04 10*3/mm3      Basophils, Absolute 0.05 10*3/mm3      Immature Grans, Absolute 0.06 (H) 10*3/mm3      nRBC 0.0 /100 WBC         Imaging Results (last 24 hours)     Procedure Component Value Units Date/Time    CT Angiogram Chest With Contrast [[id number]] Collected:  05/20/18 1219     Updated:  05/20/18 1227    Narrative:       CT ANGIOGRAM CHEST W CONTRAST- 5/20/2018 11:31 AM CDT      HISTORY: soa, hypoxic      COMPARISON: None.      DLP: 391 mGy cm     TECHNIQUE: Helical tomographic images of the chest were obtained after  the administration of intravenous contrast following angiogram protocol.  Additionally, 3D reformatted images were provided.        FINDINGS:    Pulmonary arteries: There is adequate enhancement of the pulmonary  arteries  to evaluate for central and segmental pulmonary emboli. There  are no filling defects within the main, lobar, segmental or visualized  subsegmental pulmonary arteries. The pulmonary arteries are within  normal limits.      Aorta and great vessels: The aorta is well opacified and demonstrates no  dissection or aneurysm. The great vessels are normal in appearance.     Visualized neck base: The imaged portion of the base of the neck appears  unremarkable.      Lungs: Emphysema. Biapical scarring, right greater than left. Right  apical opacity (image 48, series 6) may be part of the pleural  thickening, but an underlying lung parenchymal neoplasm is not excluded.  Bilateral pleural effusions, small. Left lower lobe opacity with air  bronchograms, concerning for left lower lobe pneumonia. Medial right  middle lobe opacity, [place] reflect a focus of infection..      Heart: Cardiomegaly. The right atrium is significantly enlarged.  Contrast reflux into the IVC, reflective of right heart dysfunction..  There is no pericardial effusion.      Mediastinum and lymph nodes: No enlarged mediastinal, hilar, or axillary  lymph nodes are present.      Skeletal and soft tissues: The osseous structures of the thorax and  surrounding soft tissues demonstrate no acute process.     Upper abdomen: The imaged portion of the upper abdomen demonstrates no  acute process.        Impression:       1. No evidence of pulmonary embolus.  2. Left lower lobe consolidation and pleural effusion, concerning for  pneumonia and parapneumonic effusion.  3. Medial right middle lobe opacity, may also reflect focus of  infection. Small right pleural effusion.  4. Cardiomegaly with findings of right heart dysfunction.  5. Right apical focal nodularity. Again, this may be part of the pleural  thickening. However, outpatient PET/CT after resolution of current  examination is recommended to assess metabolic activity.        This report was finalized on  05/20/2018 12:24 by Dr. Rosalina Ford MD.    XR Chest 1 View [[id number]] Collected:  05/20/18 1026     Updated:  05/20/18 1031    Narrative:       Exam:   XR CHEST 1 VW-       Date:  5/20/2018      History:  Female, age  78 years;soa     COMPARISON:  Chest x-ray dated 5/17/2018     Findings :     The heart and mediastinum are borderline in size, but unchanged size.  Small left pleural effusion and associated opacity. Background of  emphysema. The right lung remains without focal consolidation.  The  bones show no acute pathology.         Impression:       Impression:     Small left pleural effusion associated opacity. Consider infection in  the proper cortical setting.     This report was finalized on 05/20/2018 10:28 by Dr. Rosalina Ford MD.            ASSESSMENT AND PLAN:      1.   Active Problems:    A-fib    2.  Right middle lobe pneumonia  3.   COPD emphysema  4.   History of fibrillation with rapid ventricular response      CTA of chest noted.  Right middle lobe and left upper lobe pneumonia.  In light of her severe COPD emphysema will continue with IV antibiotics for 1 more day.  She will need follow-up with a nodule on her CTA of the chest once the acute illness has resolved.  Conference with .  Patient is adamant she is going home today I explained to them she needs at least 1 more day of anti-biotics here in the hospital.  We will get cardiology to evaluate for adjustment of rate control.        Nash Javier MD  7:34 AM 5/21/2018

## 2018-05-22 LAB
ANION GAP SERPL CALCULATED.3IONS-SCNC: ABNORMAL MMOL/L (ref 4–13)
ARTERIAL PATENCY WRIST A: POSITIVE
ATMOSPHERIC PRESS: 752 MMHG
BASE EXCESS BLDA CALC-SCNC: 11 MMOL/L (ref 0–2)
BASOPHILS # BLD AUTO: 0.05 10*3/MM3 (ref 0–0.2)
BASOPHILS NFR BLD AUTO: 0.4 % (ref 0–2)
BDY SITE: ABNORMAL
BODY TEMPERATURE: 37 C
BUN BLD-MCNC: 13 MG/DL (ref 5–21)
BUN/CREAT SERPL: 22 (ref 7–25)
CALCIUM SPEC-SCNC: 9 MG/DL (ref 8.4–10.4)
CHLORIDE SERPL-SCNC: 87 MMOL/L (ref 98–110)
CO2 SERPL-SCNC: >40 MMOL/L (ref 24–31)
CREAT BLD-MCNC: 0.59 MG/DL (ref 0.5–1.4)
DEPRECATED RDW RBC AUTO: 44.4 FL (ref 40–54)
EOSINOPHIL # BLD AUTO: 0.25 10*3/MM3 (ref 0–0.7)
EOSINOPHIL NFR BLD AUTO: 2.1 % (ref 0–4)
ERYTHROCYTE [DISTWIDTH] IN BLOOD BY AUTOMATED COUNT: 12.9 % (ref 12–15)
GAS FLOW AIRWAY: 2 LPM
GFR SERPL CREATININE-BSD FRML MDRD: 99 ML/MIN/1.73
GLUCOSE BLD-MCNC: 100 MG/DL (ref 70–100)
HCO3 BLDA-SCNC: 38.5 MMOL/L (ref 20–26)
HCT VFR BLD AUTO: 44.1 % (ref 37–47)
HGB BLD-MCNC: 14.7 G/DL (ref 12–16)
IMM GRANULOCYTES # BLD: 0.05 10*3/MM3 (ref 0–0.03)
IMM GRANULOCYTES NFR BLD: 0.4 % (ref 0–5)
LYMPHOCYTES # BLD AUTO: 1.7 10*3/MM3 (ref 0.72–4.86)
LYMPHOCYTES NFR BLD AUTO: 14.3 % (ref 15–45)
Lab: ABNORMAL
MCH RBC QN AUTO: 31.5 PG (ref 28–32)
MCHC RBC AUTO-ENTMCNC: 33.3 G/DL (ref 33–36)
MCV RBC AUTO: 94.4 FL (ref 82–98)
MODALITY: ABNORMAL
MONOCYTES # BLD AUTO: 0.84 10*3/MM3 (ref 0.19–1.3)
MONOCYTES NFR BLD AUTO: 7.1 % (ref 4–12)
NEUTROPHILS # BLD AUTO: 8.98 10*3/MM3 (ref 1.87–8.4)
NEUTROPHILS NFR BLD AUTO: 75.7 % (ref 39–78)
NRBC BLD MANUAL-RTO: 0 /100 WBC (ref 0–0)
PCO2 BLDA: 60.7 MM HG (ref 35–45)
PH BLDA: 7.41 PH UNITS (ref 7.35–7.45)
PLATELET # BLD AUTO: 307 10*3/MM3 (ref 130–400)
PMV BLD AUTO: 8.7 FL (ref 6–12)
PO2 BLDA: 66.3 MM HG (ref 83–108)
POTASSIUM BLD-SCNC: 4.3 MMOL/L (ref 3.5–5.3)
RBC # BLD AUTO: 4.67 10*6/MM3 (ref 4.2–5.4)
SAO2 % BLDCOA: 93.8 % (ref 94–99)
SODIUM BLD-SCNC: 134 MMOL/L (ref 135–145)
VENTILATOR MODE: ABNORMAL
WBC NRBC COR # BLD: 11.87 10*3/MM3 (ref 4.8–10.8)

## 2018-05-22 PROCEDURE — 82803 BLOOD GASES ANY COMBINATION: CPT

## 2018-05-22 PROCEDURE — 80048 BASIC METABOLIC PNL TOTAL CA: CPT | Performed by: FAMILY MEDICINE

## 2018-05-22 PROCEDURE — 99232 SBSQ HOSP IP/OBS MODERATE 35: CPT | Performed by: INTERNAL MEDICINE

## 2018-05-22 PROCEDURE — 36600 WITHDRAWAL OF ARTERIAL BLOOD: CPT

## 2018-05-22 PROCEDURE — 85025 COMPLETE CBC W/AUTO DIFF WBC: CPT | Performed by: FAMILY MEDICINE

## 2018-05-22 PROCEDURE — 94618 PULMONARY STRESS TESTING: CPT

## 2018-05-22 RX ORDER — DILTIAZEM HYDROCHLORIDE 360 MG/1
360 CAPSULE, EXTENDED RELEASE ORAL DAILY
Qty: 30 CAPSULE | Refills: 3 | Status: SHIPPED | OUTPATIENT
Start: 2018-05-22 | End: 2018-06-21

## 2018-05-22 RX ORDER — METHYLPREDNISOLONE 4 MG/1
TABLET ORAL
Qty: 1 EACH | Refills: 0 | Status: SHIPPED | OUTPATIENT
Start: 2018-05-22 | End: 2018-06-21

## 2018-05-22 RX ORDER — LEVOFLOXACIN 500 MG/1
500 TABLET, FILM COATED ORAL
Qty: 6 TABLET | Refills: 0 | Status: SHIPPED | OUTPATIENT
Start: 2018-05-23 | End: 2018-05-29

## 2018-05-22 RX ORDER — IPRATROPIUM BROMIDE AND ALBUTEROL SULFATE 2.5; .5 MG/3ML; MG/3ML
3 SOLUTION RESPIRATORY (INHALATION)
Qty: 360 ML | Refills: 3 | Status: SHIPPED | OUTPATIENT
Start: 2018-05-22 | End: 2018-11-26

## 2018-05-22 RX ADMIN — ATORVASTATIN CALCIUM 10 MG: 10 TABLET, FILM COATED ORAL at 20:34

## 2018-05-22 RX ADMIN — APIXABAN 5 MG: 5 TABLET, FILM COATED ORAL at 20:34

## 2018-05-22 RX ADMIN — DILTIAZEM HYDROCHLORIDE 360 MG: 180 CAPSULE, COATED, EXTENDED RELEASE ORAL at 08:59

## 2018-05-22 RX ADMIN — LEVOFLOXACIN 500 MG: 500 TABLET, FILM COATED ORAL at 08:59

## 2018-05-22 RX ADMIN — APIXABAN 5 MG: 5 TABLET, FILM COATED ORAL at 08:59

## 2018-05-22 NOTE — PROGRESS NOTES
[facility] -  Progress Note     LOS: 2 days   Patient Care Team:  Nash Javier MD as PCP - General (Family Medicine)  Sohail Strong MD as Consulting Physician (Cardiology)    Chief Complaint: Follow-up atrial fibrillation    Subjective     Interval History: Patient received 240 mg of 24-hr acting diltiazem yesterday, and was able to be weaned off of diltiazem infusion for short period of time.  However, her ventricular response at rest did steadily climb thereafter and she was placed back on diltiazem infusion overnight.  As of this morning, rates were reasonably controlled after being given an increased dose of oral medication; 360 mg of diltiazem.  She denied any associated palpitations, dyspnea, or orthopnea.  Her  was present during my evaluation earlier today, and provided important information.  He confirmed that she did indeed receive Eliquis upon discharge from last hospital stay, via free 30-day voucher.    Review of Systems:  Review of Systems   Constitution: Negative for malaise/fatigue.   Cardiovascular: Negative for chest pain, claudication, dyspnea on exertion, leg swelling, near-syncope, orthopnea, palpitations, paroxysmal nocturnal dyspnea and syncope.   Respiratory: Negative for shortness of breath.    Hematologic/Lymphatic: Does not bruise/bleed easily.       Vital Sign Min/Max for last 24 hours  Temp  Min: 97.4 °F (36.3 °C)  Max: 98.6 °F (37 °C)   BP  Min: 102/48  Max: 132/82   Pulse  Min: 81  Max: 145   Resp  Min: 17  Max: 20   SpO2  Min: 89 %  Max: 97 %   No Data Recorded   Weight  Min: 55.8 kg (123 lb)  Max: 55.8 kg (123 lb)     1    05/21/18 2023   Weight: 55.8 kg (123 lb)       Physical Exam:   Physical Exam   Constitutional: No distress.   Neck: No JVD present.   Cardiovascular: Normal rate, S1 normal, S2 normal, normal heart sounds, intact distal pulses and normal pulses.  An irregularly irregular rhythm present.   Pulmonary/Chest: Effort normal  and breath sounds normal.   Abdominal: Soft. There is no tenderness.   Neurological: She is alert and oriented to person, place, and time.   Skin: Skin is warm and dry.       Medication Review: yes  Current Facility-Administered Medications   Medication Dose Route Frequency Provider Last Rate Last Dose   • apixaban (ELIQUIS) tablet 5 mg  5 mg Oral Q12H Nash Medrano MD   5 mg at 05/22/18 0859   • atorvastatin (LIPITOR) tablet 10 mg  10 mg Oral Nightly Nash Medrano MD   10 mg at 05/21/18 2054   • diltiaZEM (CARDIZEM) 125 mg in sodium chloride 0.9 % 125 mL (1 mg/mL) infusion  5-15 mg/hr Intravenous Continuous Rosa Strong MD   Stopped at 05/22/18 0900   • diltiaZEM CD (CARDIZEM CD) 24 hr capsule 360 mg  360 mg Oral Q24H Sohail Strong MD   360 mg at 05/22/18 0859   • levoFLOXacin (LEVAQUIN) tablet 500 mg  500 mg Oral Q24H LI Zhang   500 mg at 05/22/18 0859   • sodium chloride 0.9 % flush 1-10 mL  1-10 mL Intravenous PRN Nash Javier MD       • sodium chloride 0.9 % flush 10 mL  10 mL Intravenous PRN Rosa Strong MD       • sodium chloride 0.9 % flush 10 mL  10 mL Intravenous PRN Rosa Strong MD             Results Review:   I have reviewed all laboratory data, with pertinent findings: Labs show worsening contraction alkalosis, with chloride down to 87 and CO2 greater than 40.  Creatinine remains normal at 0.59.  WBC remains relatively unchanged at 11.9.  ABG shows pH 7.4, PCO2 61, PO2 66.    I have reviewed telemetry, which shows atrial fibrillation, with rates ranging 80s-100s today      Assessment/Plan     1.  Persistent atrial fibrillation: Rapid ventricular response improving with increasing doses of diltiazem.  It is unclear whether this is a coincidental finding or related to her reasons for readmission.  -After clarifying with her significant other that the patient does indeed have at least one month's worth of Eliquis in her possession, we will proceed with CHRISTINE-guided  cardioversion tomorrow morning to restore sinus rhythm in hopes that this will lessen chances for readmission due to dyspnea and edema  -Provided forms for patient assistance program with  for Eliquis; patient refuses to take Coumadin but has no prescription drug coverage to help with any of the DOACs  -continue diltiazem 360mg daily    2.  Congestive heart failure, chronic (diastolic with right ventricular dysfunction): Improving.  No need for further diuretics at this point she has no signs of central or peripheral volume overload and today's labs show worsening contraction alkalosis.  -Patient will need prescription for Lasix to be taken as needed at home for increasing weight, edema, or shortness of breath    3.  Essential hypertension: Well-controlled    4.  Possible pneumonia: Management per primary team.    Sohail Strong MD  05/22/18  4:38 PM

## 2018-05-22 NOTE — PLAN OF CARE
Problem: Arrhythmia/Dysrhythmia (Symptomatic) (Adult)  Goal: Signs and Symptoms of Listed Potential Problems Will be Absent, Minimized or Managed (Arrhythmia/Dysrhythmia)  Outcome: Ongoing (interventions implemented as appropriate)      Problem: Infection, Risk/Actual (Adult)  Goal: Infection Prevention/Resolution  Outcome: Ongoing (interventions implemented as appropriate)      Problem: Patient Care Overview  Goal: Plan of Care Review  Outcome: Ongoing (interventions implemented as appropriate)   05/22/18 0982   Coping/Psychosocial   Plan of Care Reviewed With patient;spouse   Plan of Care Review   Progress improving   OTHER   Outcome Summary VSS, Afib 111-120, Cardizem increased to 360CD Pt scheduled for cardioversion and CHRISTINE in the am, Consent and teaching done

## 2018-05-22 NOTE — PLAN OF CARE
Problem: Patient Care Overview  Goal: Plan of Care Review  Outcome: Ongoing (interventions implemented as appropriate)   05/22/18 4291   Coping/Psychosocial   Plan of Care Reviewed With patient   Plan of Care Review   Progress no change   OTHER   Outcome Summary Patient AF 75-90. Cardizem drip continues. ABX to PO now. No c/o patient anxious to go home. VSS. Continue to monitor. AF 75-90 PVC COUP Pause 2.01

## 2018-05-22 NOTE — DISCHARGE SUMMARY
Hospital Discharge Summary    Merari Gaspar  :  1940  MRN:  [id number]    Admit date:  2018  Discharge date:   2018      Admitting Physician:    Nash Javier MD    Discharge Diagnoses:    Active Problems:    A-fib  BILATERAL PNEUMONIA  ARF  COPD EXACERBATION    Hospital Course:   The patient was admitted for the above noted medical/surgical issues. Please see daily progress note for further details concerning their stay. The patient improved throughout their stay and reached maximum medical improvement on the day of discharge. The patient/family agree with the treatment plan as outlined above. All questions concerning their stay were answered prior to discharge. They understand the importance of follow up concerning any abnormal test results.       Discharge Medications:       Merari Gaspar   Home Medication Instructions SONU:[id number]    Printed on:18 1323   Medication Information                      acyclovir (ZOVIRAX) 400 MG tablet  Take 400 mg by mouth Daily. Take no more than 5 doses a day.             apixaban (ELIQUIS) 5 MG tablet tablet  Take 1 tablet by mouth Every 12 (Twelve) Hours.             coenzyme Q10 100 MG capsule  Take 100 mg by mouth Every Night.             diltiaZEM CD (CARDIZEM CD) 360 MG 24 hr capsule  Take 1 capsule by mouth Daily.             furosemide (LASIX) 20 MG tablet  Take 20 mg by mouth Daily As Needed (swelling).             ipratropium-albuterol (DUO-NEB) 0.5-2.5 mg/3 ml nebulizer  Take 3 mL by nebulization 4 (Four) Times a Day.             levoFLOXacin (LEVAQUIN) 500 MG tablet  Take 1 tablet by mouth Daily for 6 doses.             lovastatin (MEVACOR) 20 MG tablet  Take 20 mg by mouth Every Other Day. Alternate half-tablet (10mg) dose every other day.             lovastatin (MEVACOR) 20 MG tablet  Take 10 mg by mouth Every Other Day. Alternate full tablet (20mg) dose every other day.             MethylPREDNISolone (MEDROL, ABHIJEET,) 4 MG  tablet  Take as directed on package instructions.             Multiple Vitamins-Minerals (MULTIVITAL PO)  Take 1 tablet by mouth Daily.                 Consults:   NONE    Treatments:   IV ABX, STEROIDS, NEBS, IV CARDIZEM    Disposition:   HOME  Follow up with Nash Javier MD in 1 weeks.    Signed:  Nash Medrano MD MPH  5/22/2018, 1:22 PM

## 2018-05-22 NOTE — THERAPY DISCHARGE NOTE
Exercise Oximetry    Patient Name:Merari Gaspar   MRN: [id number]   Date: 05/22/18             ROOM AIR BASELINE   SpO2% 89   Heart Rate 90   Blood Pressure      EXERCISE ON ROOM AIR SpO2% EXERCISE ON O2 @   2 LPM SpO2%   1 MINUTE 89 1 MINUTE    93   2 MINUTES 90 2 MINUTES  93   3 MINUTES 87 3 MINUTES 92   4 MINUTES 85 4 MINUTES 91   5 MINUTES 80 5 MINUTES 90   6 MINUTES 75 6 MINUTES 90              Distance Walked  250 feet Distance Walked       250 feet     Dyspnea (Emmy Scale)   Dyspnea (Emmy Scale)   Fatigue (Emmy Scale)   Fatigue (Emmy Scale)   SpO2% Post Exercise      75 SpO2% Post Exercise          90   HR Post Exercise             100 HR Post Exercise               112   Time to Recovery      4 min Time to Recovery           2 min     Comments:

## 2018-05-23 ENCOUNTER — APPOINTMENT (OUTPATIENT)
Dept: CARDIOLOGY | Facility: HOSPITAL | Age: 78
End: 2018-05-23
Attending: INTERNAL MEDICINE

## 2018-05-23 ENCOUNTER — ANESTHESIA EVENT (OUTPATIENT)
Dept: CARDIOLOGY | Facility: HOSPITAL | Age: 78
End: 2018-05-23

## 2018-05-23 ENCOUNTER — ANESTHESIA (OUTPATIENT)
Dept: CARDIOLOGY | Facility: HOSPITAL | Age: 78
End: 2018-05-23

## 2018-05-23 VITALS
HEIGHT: 64 IN | TEMPERATURE: 96.4 F | SYSTOLIC BLOOD PRESSURE: 98 MMHG | DIASTOLIC BLOOD PRESSURE: 54 MMHG | BODY MASS INDEX: 21.17 KG/M2 | WEIGHT: 124 LBS | RESPIRATION RATE: 18 BRPM | HEART RATE: 63 BPM | OXYGEN SATURATION: 92 %

## 2018-05-23 LAB — BH CV ECHO MEAS - TR MAX VEL: 269 CM/SEC

## 2018-05-23 PROCEDURE — 5A2204Z RESTORATION OF CARDIAC RHYTHM, SINGLE: ICD-10-PCS | Performed by: FAMILY MEDICINE

## 2018-05-23 PROCEDURE — 93321 DOPPLER ECHO F-UP/LMTD STD: CPT | Performed by: INTERNAL MEDICINE

## 2018-05-23 PROCEDURE — 94799 UNLISTED PULMONARY SVC/PX: CPT

## 2018-05-23 PROCEDURE — 93005 ELECTROCARDIOGRAM TRACING: CPT | Performed by: INTERNAL MEDICINE

## 2018-05-23 PROCEDURE — 93312 ECHO TRANSESOPHAGEAL: CPT

## 2018-05-23 PROCEDURE — 92960 CARDIOVERSION ELECTRIC EXT: CPT | Performed by: INTERNAL MEDICINE

## 2018-05-23 PROCEDURE — 94760 N-INVAS EAR/PLS OXIMETRY 1: CPT

## 2018-05-23 PROCEDURE — 93325 DOPPLER ECHO COLOR FLOW MAPG: CPT | Performed by: INTERNAL MEDICINE

## 2018-05-23 PROCEDURE — 93325 DOPPLER ECHO COLOR FLOW MAPG: CPT

## 2018-05-23 PROCEDURE — 93010 ELECTROCARDIOGRAM REPORT: CPT | Performed by: INTERNAL MEDICINE

## 2018-05-23 PROCEDURE — 25010000002 PROPOFOL 10 MG/ML EMULSION: Performed by: NURSE ANESTHETIST, CERTIFIED REGISTERED

## 2018-05-23 PROCEDURE — 92960 CARDIOVERSION ELECTRIC EXT: CPT

## 2018-05-23 PROCEDURE — 93321 DOPPLER ECHO F-UP/LMTD STD: CPT

## 2018-05-23 PROCEDURE — 93312 ECHO TRANSESOPHAGEAL: CPT | Performed by: INTERNAL MEDICINE

## 2018-05-23 RX ORDER — SODIUM CHLORIDE 9 MG/ML
80 INJECTION, SOLUTION INTRAVENOUS CONTINUOUS
Status: DISCONTINUED | OUTPATIENT
Start: 2018-05-23 | End: 2018-05-23 | Stop reason: HOSPADM

## 2018-05-23 RX ORDER — PROPOFOL 10 MG/ML
VIAL (ML) INTRAVENOUS AS NEEDED
Status: DISCONTINUED | OUTPATIENT
Start: 2018-05-23 | End: 2018-05-23 | Stop reason: SURG

## 2018-05-23 RX ORDER — LIDOCAINE HYDROCHLORIDE 20 MG/ML
INJECTION, SOLUTION INFILTRATION; PERINEURAL AS NEEDED
Status: DISCONTINUED | OUTPATIENT
Start: 2018-05-23 | End: 2018-05-23 | Stop reason: SURG

## 2018-05-23 RX ADMIN — TOPICAL ANESTHETIC 1 SPRAY: 200 SPRAY DENTAL; PERIODONTAL at 08:14

## 2018-05-23 RX ADMIN — APIXABAN 5 MG: 5 TABLET, FILM COATED ORAL at 07:12

## 2018-05-23 RX ADMIN — LIDOCAINE HYDROCHLORIDE 100 MG: 20 INJECTION, SOLUTION INFILTRATION; PERINEURAL at 08:15

## 2018-05-23 RX ADMIN — PROPOFOL 200 MG: 10 INJECTION, EMULSION INTRAVENOUS at 08:15

## 2018-05-23 RX ADMIN — SODIUM CHLORIDE 80 ML/HR: 9 INJECTION, SOLUTION INTRAVENOUS at 07:37

## 2018-05-23 RX ADMIN — DILTIAZEM HYDROCHLORIDE 360 MG: 180 CAPSULE, COATED, EXTENDED RELEASE ORAL at 07:13

## 2018-05-23 RX ADMIN — LEVOFLOXACIN 500 MG: 500 TABLET, FILM COATED ORAL at 10:26

## 2018-05-23 NOTE — DISCHARGE PLACEMENT REQUEST
"Allan Vila (78 y.o. Female)     Date of Birth Social Security Number Address Home Phone MRN    [id number]  [id number]  [place] [address] [phone] [id number]    Congregation Marital Status          Caodaism        Admission Date Admission Type Admitting Provider Attending Provider Department, Room/Bed    5/20/18 Emergency Nash Javier MD [place], Nash Sheffield MD [address], 445/1    Discharge Date Discharge Disposition Discharge Destination                       Attending Provider:  Nash Javier MD    Allergies:  Statins    Isolation:  None   Infection:  None   Code Status:  FULL    Ht:  162.6 cm (64\")   Wt:  56.2 kg (124 lb)    Admission Cmt:  None   Principal Problem:  None                Active Insurance as of 5/20/2018     Primary Coverage     Payor Plan Insurance Group Employer/Plan Group    MEDICARE MEDICARE A & B      Payor Plan Address Payor Plan Phone Number Effective From Effective To    [address] [phone] 2/1/2005     [facility] [address]       Subscriber Name Subscriber Birth Date Member ID       ALLAN VILA 1940 [id number]           Secondary Coverage     Payor Plan Insurance Group Employer/Plan Group    [employer]      Payor Plan Address Payor Plan Phone Number Effective From Effective To    [address] [address] [phone] 8/1/2014     [facility] [id number]       Subscriber Name Subscriber Birth Date Member ID       ALLAN VILA 1940 [id number]                 Emergency Contacts      (Rel.) Home Phone Work Phone Mobile Phone    Arnaldo Vila (Spouse) [phone] -- --               History & Physical      Nash Javier MD at 5/21/2018  7:34 AM          History and Physical       CHIEF COMPLAINT:  Shortness of air    Reason for Admission:  Pneumonia    History Obtained From: Patient/    HISTORY OF PRESENT ILLNESS:      The patient is a 78 y.o. female with " significant past medical history of recent admission for atrial fibrillation and rapid ventricular response who presents with cough congestion.  Patient was admitted 2-3 days ago from the office with a episode of tachycardia and shortness of air.  Her heart rate was irregular.  EKG on admission showed atrial fibrillation with a rapid ventricular response.  Chest x-ray at that time was unremarkable, however she has greater than 100 pack year smoking.  She presented back to the emergency room with cough code congestion CT of the chest showed a right middle lobe pneumonia currently admitted for antibiotic treatment and further delineation of care.  No other precipitating or relieving factors no pain associated with this episode.    Past Medical History:    Past Medical History:   Diagnosis Date   • Atrial fibrillation    • COPD (chronic obstructive pulmonary disease)    • Hypercholesteremia    • Smoker        Past Surgical History:    Past Surgical History:   Procedure Laterality Date   • BLADDER SURGERY     • HYSTERECTOMY         Medications Prior to Admission:    Prescriptions Prior to Admission   Medication Sig Dispense Refill Last Dose   • acyclovir (ZOVIRAX) 400 MG tablet Take 400 mg by mouth Daily. Take no more than 5 doses a day.   5/20/2018 at 0800   • apixaban (ELIQUIS) 5 MG tablet tablet Take 1 tablet by mouth Every 12 (Twelve) Hours. 60 tablet 2 5/20/2018 at 0800   • coenzyme Q10 100 MG capsule Take 100 mg by mouth Every Night.   5/19/2018 at 2000   • diltiaZEM (CARDIZEM) 60 MG tablet Take 60 mg by mouth 3 (Three) Times a Day.   5/20/2018 at 0800   • furosemide (LASIX) 20 MG tablet Take 20 mg by mouth Daily As Needed (swelling).   Past Month at Unknown time   • lovastatin (MEVACOR) 20 MG tablet Take 20 mg by mouth Every Other Day. Alternate half-tablet (10mg) dose every other day.   5/19/2018 at 2000   • lovastatin (MEVACOR) 20 MG tablet Take 10 mg by mouth Every Other Day. Alternate full tablet (20mg) dose  every other day.   5/18/2018 at 2000   • Multiple Vitamins-Minerals (MULTIVITAL PO) Take 1 tablet by mouth Daily.   5/20/2018 at 0800       Allergies:  Statins    Social History:   Social History     Social History   • Marital status:      Spouse name: N/A   • Number of children: N/A   • Years of education: N/A     Occupational History   • Not on file.     Social History Main Topics   • Smoking status: Current Every Day Smoker     Packs/day: 0.50   • Smokeless tobacco: Not on file   • Alcohol use No   • Drug use: No   • Sexual activity: Not on file     Other Topics Concern   • Not on file     Social History Narrative   • No narrative on file       Family History:   History reviewed. No pertinent family history.    REVIEW OF SYSTEMS:    CONSTITUTIONAL:  Negative for anorexia, chills, fevers, night sweats and weight loss  EYES:  negative for eye dryness, icterus and redness  HEENT:   negative for dental problems, epistaxis, facial trauma and thrush  RESPIRATORY:  Cough, yellow sputum, dyspnea on exertion  CARDIOVASCULAR: Irregularly irregular heart rate at 140  GASTROINTESTINAL:  negative for abdominal pain, hematemesis, jaundice, melena and rectal bleeding  MUSCULOSKELETAL:  negative for muscle weakness, myalgias and neck pain  NEUROLOGICAL:   negative for dizziness, headaches, seizures, speech problems, tremors and vertigo  INTEGUMENT: negative for pruritus, rash, skin color change and skin lesion(s)       Vital Signs   Temp:  [97.5 °F (36.4 °C)-98.6 °F (37 °C)] 98.6 °F (37 °C)  Heart Rate:  [] 100  Resp:  [17-20] 19  BP: ()/(46-81) 102/48          Physical Exam:  Constitutional: The patient is oriented to person, place, and time. They appear well-developed.   HEENT:   Head: Normocephalic and atraumatic.   Eyes: Pupils are equal, round, and reactive to light.   Neck: Neck supple without masses or carotid bruit.  Cardiovascular: Irregularly irregular at 140.    Pulmonary/Chest: End expiratory  wheeze with minimal air movement  Abdominal: Soft. Bowel sounds are normal. There is  no distension or  Tenderness appreciated. There is no rebound and no guarding.   Musculoskeletal: Normal range of motion. There is  no edema or tenderness.   Neurological: Pt is alert and oriented to person, place, and time. They have normal reflexes. CN 2-12 appear grossly intact  Skin: Skin is warm and dry without significant rashes     Results Review:   I reviewed the patient's new imaging results and agree with the interpretation.    DATA:  Lab Results (last 24 hours)     Procedure Component Value Units Date/Time    Blood Culture - Blood, [[id number]]  (Normal) Collected:  05/20/18 1300    Specimen:  Blood from Hand, Left Updated:  05/21/18 0130     Blood Culture No growth at less than 24 hours    Blood Culture - Blood, [[id number]]  (Normal) Collected:  05/20/18 1303    Specimen:  Blood from Arm, Right Updated:  05/21/18 0130     Blood Culture No growth at less than 24 hours    Lactic Acid, Plasma [[id number]]  (Normal) Collected:  05/20/18 1303    Specimen:  Blood Updated:  05/20/18 1319     Lactate 0.7 mmol/L     [place] Draw [[id number]] Collected:  05/20/18 1007    Specimen:  Blood Updated:  05/20/18 1115    Narrative:       The following orders were created for panel order [place] Draw.  Procedure                               Abnormality         Status                     ---------                               -----------         ------                     Light Blue Top[[id number]]                                   Final result               Green Top (Gel)[[id number]]                                  Final result               Lavender Top[[id number]]                                     Final result               Red Top[[id number]]                                          Final result                 Please view results for these tests on the individual orders.    Lavender Top [[id number]] Collected:  05/20/18 1007     Specimen:  Blood Updated:  05/20/18 1115     Extra Tube hold for add-on     Comment: Auto resulted       Red Top [[id number]] Collected:  05/20/18 1007    Specimen:  Blood Updated:  05/20/18 1115     Extra Tube Hold for add-ons.     Comment: Auto resulted.       Light Blue Top [[id number]] Collected:  05/20/18 1007    Specimen:  Blood Updated:  05/20/18 1115     Extra Tube hold for add-on     Comment: Auto resulted       Green Top (Gel) [[id number]] Collected:  05/20/18 1007    Specimen:  Blood Updated:  05/20/18 1115     Extra Tube Hold for add-ons.     Comment: Auto resulted.       Comprehensive Metabolic Panel [[id number]]  (Abnormal) Collected:  05/20/18 1007    Specimen:  Blood Updated:  05/20/18 1053     Glucose 99 mg/dL      BUN 12 mg/dL      Comment: Specimen hemolyzed. Results may be affected.        Creatinine 0.58 mg/dL      Sodium 133 (L) mmol/L      Potassium 5.0 mmol/L      Comment: Specimen hemolyzed.  Results may be affected.        Chloride 91 (L) mmol/L      CO2 37.0 (H) mmol/L      Calcium 8.7 mg/dL      Total Protein 6.9 g/dL      Albumin 4.10 g/dL      ALT (SGPT) 86 (H) U/L      Comment: Specimen hemolyzed.  Results may be affected.        AST (SGOT) 55 (H) U/L      Comment: Specimen hemolyzed.  Results may be affected.        Alkaline Phosphatase 83 U/L      Comment: Specimen hemolyzed. Results may be affected.        Total Bilirubin 0.6 mg/dL      eGFR Non African Amer 101 mL/min/1.73      Globulin 2.8 gm/dL      A/G Ratio 1.5 g/dL      BUN/Creatinine Ratio 20.7     Anion Gap 5.0 mmol/L     Narrative:       The MDRD GFR formula is only valid for adults with stable renal function between ages 18 and 70.    BNP [[id number]]  (Normal) Collected:  05/20/18 1007    Specimen:  Blood Updated:  05/20/18 1052     proBNP 592.0 pg/mL     Troponin [[id number]]  (Normal) Collected:  05/20/18 1007    Specimen:  Blood Updated:  05/20/18 1052     Troponin I <0.012 ng/mL     D-dimer, Quantitative [[id number]]   (Abnormal) Collected:  05/20/18 1007    Specimen:  Blood Updated:  05/20/18 1040     D-Dimer, Quantitative 1.11 (H) mg/L (FEU)     Narrative:       Reference Range is 0-0.50 mg/L FEU. However, results <0.50 mg/L FEU tends to rule out DVT or PE. Results >0.50 mg/L FEU are not useful in predicting absence or presence of DVT or PE.    CBC Auto Differential [[id number]]  (Abnormal) Collected:  05/20/18 1007    Specimen:  Blood Updated:  05/20/18 1035     WBC 11.46 (H) 10*3/mm3      RBC 4.58 10*6/mm3      Hemoglobin 14.2 g/dL      Hematocrit 43.6 %      MCV 95.2 fL      MCH 31.0 pg      MCHC 32.6 (L) g/dL      RDW 13.1 %      RDW-SD 45.6 fl      MPV 8.7 fL      Platelets 294 10*3/mm3      Neutrophil % 80.1 (H) %      Lymphocyte % 12.3 (L) %      Monocyte % 6.4 %      Eosinophil % 0.3 %      Basophil % 0.4 %      Immature Grans % 0.5 %      Neutrophils, Absolute 9.17 (H) 10*3/mm3      Lymphocytes, Absolute 1.41 10*3/mm3      Monocytes, Absolute 0.73 10*3/mm3      Eosinophils, Absolute 0.04 10*3/mm3      Basophils, Absolute 0.05 10*3/mm3      Immature Grans, Absolute 0.06 (H) 10*3/mm3      nRBC 0.0 /100 WBC         Imaging Results (last 24 hours)     Procedure Component Value Units Date/Time    CT Angiogram Chest With Contrast [[id number]] Collected:  05/20/18 1219     Updated:  05/20/18 1227    Narrative:       CT ANGIOGRAM CHEST W CONTRAST- 5/20/2018 11:31 AM CDT      HISTORY: soa, hypoxic      COMPARISON: None.      DLP: 391 mGy cm     TECHNIQUE: Helical tomographic images of the chest were obtained after  the administration of intravenous contrast following angiogram protocol.  Additionally, 3D reformatted images were provided.        FINDINGS:    Pulmonary arteries: There is adequate enhancement of the pulmonary  arteries to evaluate for central and segmental pulmonary emboli. There  are no filling defects within the main, lobar, segmental or visualized  subsegmental pulmonary arteries. The pulmonary arteries are  within  normal limits.      Aorta and great vessels: The aorta is well opacified and demonstrates no  dissection or aneurysm. The great vessels are normal in appearance.     Visualized neck base: The imaged portion of the base of the neck appears  unremarkable.      Lungs: Emphysema. Biapical scarring, right greater than left. Right  apical opacity (image 48, series 6) may be part of the pleural  thickening, but an underlying lung parenchymal neoplasm is not excluded.  Bilateral pleural effusions, small. Left lower lobe opacity with air  bronchograms, concerning for left lower lobe pneumonia. Medial right  middle lobe opacity, [place] reflect a focus of infection..      Heart: Cardiomegaly. The right atrium is significantly enlarged.  Contrast reflux into the IVC, reflective of right heart dysfunction..  There is no pericardial effusion.      Mediastinum and lymph nodes: No enlarged mediastinal, hilar, or axillary  lymph nodes are present.      Skeletal and soft tissues: The osseous structures of the thorax and  surrounding soft tissues demonstrate no acute process.     Upper abdomen: The imaged portion of the upper abdomen demonstrates no  acute process.        Impression:       1. No evidence of pulmonary embolus.  2. Left lower lobe consolidation and pleural effusion, concerning for  pneumonia and parapneumonic effusion.  3. Medial right middle lobe opacity, may also reflect focus of  infection. Small right pleural effusion.  4. Cardiomegaly with findings of right heart dysfunction.  5. Right apical focal nodularity. Again, this may be part of the pleural  thickening. However, outpatient PET/CT after resolution of current  examination is recommended to assess metabolic activity.        This report was finalized on 05/20/2018 12:24 by Dr. Rosalina Ford MD.    XR Chest 1 View [[id number]] Collected:  05/20/18 1026     Updated:  05/20/18 1031    Narrative:       Exam:   XR CHEST 1 VW-       Date:  5/20/2018       History:  Female, age  78 years;soa     COMPARISON:  Chest x-ray dated 5/17/2018     Findings :     The heart and mediastinum are borderline in size, but unchanged size.  Small left pleural effusion and associated opacity. Background of  emphysema. The right lung remains without focal consolidation.  The  bones show no acute pathology.         Impression:       Impression:     Small left pleural effusion associated opacity. Consider infection in  the proper cortical setting.     This report was finalized on 05/20/2018 10:28 by Dr. Rosalina Ford MD.            ASSESSMENT AND PLAN:      1.   Active Problems:    A-fib    2.  Right middle lobe pneumonia  3.   COPD emphysema  4.   History of fibrillation with rapid ventricular response      CTA of chest noted.  Right middle lobe and left upper lobe pneumonia.  In light of her severe COPD emphysema will continue with IV antibiotics for 1 more day.  She will need follow-up with a nodule on her CTA of the chest once the acute illness has resolved.  Conference with .  Patient is adamant she is going home today I explained to them she needs at least 1 more day of anti-biotics here in the hospital.  We will get cardiology to evaluate for adjustment of rate control.        Nash Javier MD  7:34 AM 5/21/2018      Electronically signed by Nash Javier MD at 5/21/2018  7:40 AM       Forest Souza, RADHA Respiratory Therapist Signed   Therapy Discharge Note Date of Service: 5/22/2018 11:58 AM           Exercise Oximetry     Patient Name:Merari Gaspar   MRN: [id number]   Date: 05/22/18                                                                                                     ROOM AIR BASELINE   SpO2% 89   Heart Rate 90   Blood Pressure       EXERCISE ON ROOM AIR SpO2% EXERCISE ON O2 @   2 LPM SpO2%   1 MINUTE 89 1 MINUTE    93   2 MINUTES 90 2 MINUTES  93   3 MINUTES 87 3 MINUTES 92   4 MINUTES 85 4 MINUTES 91   5 MINUTES 80 5 MINUTES 90   6  MINUTES 75 6 MINUTES 90                                                                                                                 Distance Walked  250 feet Distance Walked       250 feet     Dyspnea (Emmy Scale)   Dyspnea (Emmy Scale)   Fatigue (Emmy Scale)   Fatigue (Emmy Scale)   SpO2% Post Exercise      75 SpO2% Post Exercise          90   HR Post Exercise             100 HR Post Exercise               112   Time to Recovery      4 min Time to Recovery           2 min      Comments:           Oxygen Therapy [EQ60] (Order [id number])   Order   Date: 5/22/2018 Department: [address] Ordering/Authorizing: Nash Medrano MD   Order History   Outpatient   Date/Time Action Taken User Additional Information   05/22/18 1322 Sign Nash Medrano MD    Order Details     Frequency Duration Priority Order Class   None None Routine External   Start Date/Time     Start Date   05/22/18   Order Questions     Question Answer Comment   Delivery Modality Nasal Cannula    Liters Per Minute: 2    Duration: Continuous    Equipment  Oxygen Concentrator &  &  Portable Gaseous Oxygen System & Portable Oxygen Contents Gaseous &  Conserving Regulator    Length of Need (99 Months = Lifetime) 99 Months = Lifetime    Note:  Custom values to enter length of need for DME          Source Order Set / Preference List     Preference List   AMB SUPPLIES [1416]   Clinical Indications      ICD-10-CM ICD-9-CM   Panlobular emphysema  - Primary     J43.1 492.8   Pneumonia of both lungs due to infectious organism, unspecified part of lung     J18.9 483.8   Reprint Order Requisition     [facility] (Order #[id number]) on 5/22/18   Encounter-Level Cardiology Documents:     There are no encounter-level cardiology documents.      Encounter     View Encounter               Order Provider Info         Office phone Pager E-mail   Ordering User Nash Medrano [phone] -- --   Authorizing Provider Nash  Todd Medrano [phone] -- --   Attending Provider Nash Javier [phone] -- --   Linked Charges     No charges linked to this procedure   Order Transmittal Tracking     [facility] (Order #[id number]) on 5/22/18   Authorized by:  Nash Medrano MD  (NPI: [id number])       Lab Component SmartPhrase Guide     [facility] (Order #[id number]) on 5/22/18

## 2018-05-23 NOTE — PROGRESS NOTES
Received request to speak with patient about a transition visit from [facility] and she declined at this time. Aniya Barrios RN, [facility]

## 2018-05-23 NOTE — ANESTHESIA PREPROCEDURE EVALUATION
Anesthesia Evaluation     Patient summary reviewed   no history of anesthetic complications:  NPO Solid Status: > 8 hours  NPO Liquid Status: > 8 hours           Airway   Mallampati: I  TM distance: >3 FB  Neck ROM: full  No difficulty expected  Dental    (+) partials    Pulmonary    (+) a smoker, COPD (in process of setting up home 02),   (-) asthma, sleep apnea  Cardiovascular   Exercise tolerance: poor (<4 METS)    ECG reviewed  PT is on anticoagulation therapy  Rhythm: irregular    (+) hypertension, dysrhythmias Atrial Fib, hyperlipidemia,       Neuro/Psych  (-) seizures, TIA, CVA  GI/Hepatic/Renal/Endo    (-) liver disease, no renal disease, diabetes    Musculoskeletal (-) negative ROS    Abdominal    Substance History      OB/GYN          Other - negative ROS                       Anesthesia Plan    ASA 3     MAC     intravenous induction   Anesthetic plan and risks discussed with patient.

## 2018-05-23 NOTE — PROGRESS NOTES
[facility]  Daily Progress Note  Merari Gaspar  MRN: [id number] LOS: 3    Admit Date: 5/20/2018 5/23/2018 7:43 AM    Subjective:          Chief Complaint:  Chief Complaint   Patient presents with   • Edema       Interval History:    Reviewed overnight events and nursing notes.   Status:  Feeling better  Pain:  some relief    On rounds yesterday, patient demanding to go home.  Made arrangements, then when cardiology rounded she was in afib with RVR and dc held.  Plans for CHRISTINE and cardioversion per cards noted    ROS:  10 point ROS obtained:   Gen: No fevers  HEENT: No migraine or visual change  Lung:++cough, no hemoptysis ++wheezing  Cv: No chest pain or pressure  Abd: No nausea or vomiting, no change in bowel function, no gi bleeding  Ext: No increased pain or swelling  Neuro: No seizure or syncope  Skin: No new rashes  Endocrine: No polyuria, polyphagia or polydipsia  Psych: No increased anxiety or depression  MS: No increase in joint pain or swelling reported    DIET:  NPO Diet    Medications:     diltiaZEM 5-15 mg/hr Last Rate: Stopped (05/22/18 0900)   sodium chloride 80 mL/hr Last Rate: 80 mL/hr (05/23/18 0737)       apixaban 5 mg Oral Q12H   atorvastatin 10 mg Oral Nightly   diltiaZEM  mg Oral Q24H   levoFLOXacin 500 mg Oral Q24H       Data:     Code Status: Full Code    History reviewed. No pertinent family history.  Social History     Social History   • Marital status:      Spouse name: N/A   • Number of children: N/A   • Years of education: N/A     Occupational History   • Not on file.     Social History Main Topics   • Smoking status: Current Every Day Smoker     Packs/day: 0.50   • Smokeless tobacco: Not on file   • Alcohol use No   • Drug use: No   • Sexual activity: Not on file     Other Topics Concern   • Not on file     Social History Narrative   • No narrative on file       Labs:    Lab Results (last 72 hours)     Procedure Component Value Units Date/Time    Blood Culture -  Blood, [[id number]]  (Normal) Collected:  05/20/18 1300    Specimen:  Blood from Hand, Left Updated:  05/22/18 1330     Blood Culture No growth at 2 days    Blood Culture - Blood, [[id number]]  (Normal) Collected:  05/20/18 1303    Specimen:  Blood from Arm, Right Updated:  05/22/18 1330     Blood Culture No growth at 2 days    Blood Gas, Arterial [[id number]]  (Abnormal) Collected:  05/22/18 0847    Specimen:  Arterial Blood Updated:  05/22/18 0849     Site Right Radial     Mina's Test Positive     pH, Arterial 7.410 pH units      pCO2, Arterial 60.7 (H) mm Hg      pO2, Arterial 66.3 (L) mm Hg      HCO3, Arterial 38.5 (H) mmol/L      Base Excess, Arterial 11.0 (H) mmol/L      O2 Saturation, Arterial 93.8 (L) %      Temperature 37.0 C      Barometric Pressure for Blood Gas 752 mmHg      Modality Nasal Cannula     Flow Rate 2.0 lpm      Ventilator Mode NA     Collected by [id number]    Basic Metabolic Panel [[id number]]  (Abnormal) Collected:  05/22/18 0631    Specimen:  Blood Updated:  05/22/18 0709     Glucose 100 mg/dL      BUN 13 mg/dL      Creatinine 0.59 mg/dL      Sodium 134 (L) mmol/L      Potassium 4.3 mmol/L      Chloride 87 (L) mmol/L      CO2 >40.0 (C) mmol/L      Calcium 9.0 mg/dL      eGFR Non African Amer 99 mL/min/1.73      BUN/Creatinine Ratio 22.0     Anion Gap -- mmol/L      Comment: Unable to calculate Anion Gap.       Narrative:       The MDRD GFR formula is only valid for adults with stable renal function between ages 18 and 70.    CBC & Differential [[id number]] Collected:  05/22/18 0631    Specimen:  Blood Updated:  05/22/18 0654    Narrative:       The following orders were created for panel order CBC & Differential.  Procedure                               Abnormality         Status                     ---------                               -----------         ------                     CBC Auto Differential[[id number]]        Abnormal            Final result                 Please view results for  these tests on the individual orders.    CBC Auto Differential [[id number]]  (Abnormal) Collected:  05/22/18 0631    Specimen:  Blood Updated:  05/22/18 0654     WBC 11.87 (H) 10*3/mm3      RBC 4.67 10*6/mm3      Hemoglobin 14.7 g/dL      Hematocrit 44.1 %      MCV 94.4 fL      MCH 31.5 pg      MCHC 33.3 g/dL      RDW 12.9 %      RDW-SD 44.4 fl      MPV 8.7 fL      Platelets 307 10*3/mm3      Neutrophil % 75.7 %      Lymphocyte % 14.3 (L) %      Monocyte % 7.1 %      Eosinophil % 2.1 %      Basophil % 0.4 %      Immature Grans % 0.4 %      Neutrophils, Absolute 8.98 (H) 10*3/mm3      Lymphocytes, Absolute 1.70 10*3/mm3      Monocytes, Absolute 0.84 10*3/mm3      Eosinophils, Absolute 0.25 10*3/mm3      Basophils, Absolute 0.05 10*3/mm3      Immature Grans, Absolute 0.05 (H) 10*3/mm3      nRBC 0.0 /100 WBC     Lactic Acid, Plasma [[id number]]  (Normal) Collected:  05/20/18 1303    Specimen:  Blood Updated:  05/20/18 1319     Lactate 0.7 mmol/L     Martha Draw [[id number]] Collected:  05/20/18 1007    Specimen:  Blood Updated:  05/20/18 1115    Narrative:       The following orders were created for panel order Martha Draw.  Procedure                               Abnormality         Status                     ---------                               -----------         ------                     Light Blue Top[[id number]]                                   Final result               Green Top (Gel)[[id number]]                                  Final result               Lavender Top[[id number]]                                     Final result               Red Top[[id number]]                                          Final result                 Please view results for these tests on the individual orders.    Lavender Top [[id number]] Collected:  05/20/18 1007    Specimen:  Blood Updated:  05/20/18 1115     Extra Tube hold for add-on     Comment: Auto resulted       Red Top [[id number]] Collected:  05/20/18 1007    Specimen:   Blood Updated:  05/20/18 1115     Extra Tube Hold for add-ons.     Comment: Auto resulted.       Light Blue Top [[id number]] Collected:  05/20/18 1007    Specimen:  Blood Updated:  05/20/18 1115     Extra Tube hold for add-on     Comment: Auto resulted       Green Top (Gel) [[id number]] Collected:  05/20/18 1007    Specimen:  Blood Updated:  05/20/18 1115     Extra Tube Hold for add-ons.     Comment: Auto resulted.       Comprehensive Metabolic Panel [[id number]]  (Abnormal) Collected:  05/20/18 1007    Specimen:  Blood Updated:  05/20/18 1053     Glucose 99 mg/dL      BUN 12 mg/dL      Comment: Specimen hemolyzed. Results may be affected.        Creatinine 0.58 mg/dL      Sodium 133 (L) mmol/L      Potassium 5.0 mmol/L      Comment: Specimen hemolyzed.  Results may be affected.        Chloride 91 (L) mmol/L      CO2 37.0 (H) mmol/L      Calcium 8.7 mg/dL      Total Protein 6.9 g/dL      Albumin 4.10 g/dL      ALT (SGPT) 86 (H) U/L      Comment: Specimen hemolyzed.  Results may be affected.        AST (SGOT) 55 (H) U/L      Comment: Specimen hemolyzed.  Results may be affected.        Alkaline Phosphatase 83 U/L      Comment: Specimen hemolyzed. Results may be affected.        Total Bilirubin 0.6 mg/dL      eGFR Non African Amer 101 mL/min/1.73      Globulin 2.8 gm/dL      A/G Ratio 1.5 g/dL      BUN/Creatinine Ratio 20.7     Anion Gap 5.0 mmol/L     Narrative:       The MDRD GFR formula is only valid for adults with stable renal function between ages 18 and 70.    BNP [[id number]]  (Normal) Collected:  05/20/18 1007    Specimen:  Blood Updated:  05/20/18 1052     proBNP 592.0 pg/mL     Troponin [[id number]]  (Normal) Collected:  05/20/18 1007    Specimen:  Blood Updated:  05/20/18 1052     Troponin I <0.012 ng/mL     D-dimer, Quantitative [[id number]]  (Abnormal) Collected:  05/20/18 1007    Specimen:  Blood Updated:  05/20/18 1040     D-Dimer, Quantitative 1.11 (H) mg/L (FEU)     Narrative:       Reference Range is  "0-0.50 mg/L FEU. However, results <0.50 mg/L FEU tends to rule out DVT or PE. Results >0.50 mg/L FEU are not useful in predicting absence or presence of DVT or PE.    CBC Auto Differential [[id number]]  (Abnormal) Collected:  18 1007    Specimen:  Blood Updated:  18 1035     WBC 11.46 (H) 10*3/mm3      RBC 4.58 10*6/mm3      Hemoglobin 14.2 g/dL      Hematocrit 43.6 %      MCV 95.2 fL      MCH 31.0 pg      MCHC 32.6 (L) g/dL      RDW 13.1 %      RDW-SD 45.6 fl      MPV 8.7 fL      Platelets 294 10*3/mm3      Neutrophil % 80.1 (H) %      Lymphocyte % 12.3 (L) %      Monocyte % 6.4 %      Eosinophil % 0.3 %      Basophil % 0.4 %      Immature Grans % 0.5 %      Neutrophils, Absolute 9.17 (H) 10*3/mm3      Lymphocytes, Absolute 1.41 10*3/mm3      Monocytes, Absolute 0.73 10*3/mm3      Eosinophils, Absolute 0.04 10*3/mm3      Basophils, Absolute 0.05 10*3/mm3      Immature Grans, Absolute 0.06 (H) 10*3/mm3      nRBC 0.0 /100 WBC             Objective:     Vitals: /68 (BP Location: Right arm, Patient Position: Sitting)   Pulse 98   Temp 98.1 °F (36.7 °C) (Temporal Artery )   Resp 26   Ht 162.6 cm (64\")   Wt 56.2 kg (124 lb)   SpO2 96%   BMI 21.28 kg/m²    Intake/Output Summary (Last 24 hours) at 18 0743  Last data filed at 18 0338   Gross per 24 hour   Intake              360 ml   Output             1400 ml   Net            -1040 ml    Temp (24hrs), Av.8 °F (36.6 °C), Min:97.2 °F (36.2 °C), Max:98.6 °F (37 °C)        Physical Examination:   General appearance - alert, well appearing, and in no distress and oriented to person, place, and time  Mental status - alert, oriented to person, place, and time, normal mood, behavior, speech, dress, motor activity, and thought processes  Eyes - pupils equal and reactive, extraocular eye movements intact  Ears - bilateral TM's and external ear canals normal, hearing grossly normal bilaterally  Mouth - mucous membranes moist, pharynx normal " without lesions  Neck - supple, no significant adenopathy  Lymphatics - no palpable lymphadenopathy, no hepatosplenomegaly  Chest - lungs with bilateral wheeze  Heart - normal rate, regular rhythm, normal S1, S2, no murmurs, rubs, clicks or gallops  Abdomen - soft, nontender, nondistended, no masses or organomegaly bowel sounds normal  Neurological - alert, oriented, normal speech, no focal findings or movement disorder noted  Musculoskeletal - no joint tenderness, deformity or swelling  Extremities - peripheral pulses normal, no pedal edema, no clubbing or cyanosis  Skin - normal coloration and turgor, no rashes, no suspicious skin lesions noted      Assessment and Plan:     Primary Problem:  COPD EXAC  AFIB WITH R    [facility] Problem list:  Active Problems:    A-fib      PMH:  Past Medical History:   Diagnosis Date   • Atrial fibrillation    • COPD (chronic obstructive pulmonary disease)    • Hypercholesteremia    • Smoker        Treatment Plan:    COPD:  1. Start/continue IV steroids and wean as tolerated  2. Supplemental 02 to maintain oxygen sats approximately 90%, avoiding suppression of respiratory drive in CO2 retainers  3. Aggressive pulmonary toilet with Duoneb   4. Early mobilization  5. Incentive spirometry  6. DVT prophylaxis  7. IV antibiotics for evidence of Acute on Chronic Bronchitis  8. Monitor for respiratory distress  9. Pulmonary consult as needed    ATRIAL FIBRILLATION:  10. Rate control - utilize cardizem 10mg IV bolus and 5mg/hr as needed to maintain rate <100.  If this fails or BP intolerant, will Add IV Amiodarone and/or Digoxin  11. Anticoagulation - Eliquis,etc.  12. Telemetry monitoring  13. Cardiology consultation -CHRISTINE AND CARDIOVERSION TODAY      Discharge planning:   Home    Reviewed treatment plans with the patient and/or family.   20 minutes spent in face to face interaction and coordination of care.     Electronically signed by Nash Medrano MD on 5/23/2018 at 7:43 AM

## 2018-05-23 NOTE — PLAN OF CARE
Problem: Arrhythmia/Dysrhythmia (Symptomatic) (Adult)  Goal: Signs and Symptoms of Listed Potential Problems Will be Absent, Minimized or Managed (Arrhythmia/Dysrhythmia)  Outcome: Ongoing (interventions implemented as appropriate)      Problem: Infection, Risk/Actual (Adult)  Goal: Identify Related Risk Factors and Signs and Symptoms  Outcome: Ongoing (interventions implemented as appropriate)    Goal: Infection Prevention/Resolution  Outcome: Ongoing (interventions implemented as appropriate)      Problem: Patient Care Overview  Goal: Plan of Care Review  Outcome: Ongoing (interventions implemented as appropriate)   05/23/18 0504   Coping/Psychosocial   Plan of Care Reviewed With patient   Plan of Care Review   Progress improving   OTHER   Outcome Summary VSS. A-fib  on tele. Denies pain this shift. NPO for cardioversion and CHRISTINE this AM. Labs pending. Will cont to monitor.

## 2018-05-23 NOTE — ANESTHESIA POSTPROCEDURE EVALUATION
"Patient: Merari Gaspar    Procedure Summary     Date:  05/23/18 Room / Location:  [facility] CLOSE OBSERVATION UNIT    Anesthesia Start:  0812 Anesthesia Stop:  0847    Procedures:       ADULT TRANSESOPHAGEAL ECHO (CHRISTINE) W/ CONT IF NECESSARY PER PROTOCOL      CARDIOVERSION EXTERNAL IN CARDIOLOGY DEPARTMENT Diagnosis:       (Arrhythmia)      (AFib)      (Pre-cardioversion)      (a-fib)    Scheduled Providers:   Provider:  Esteban Choi CRNA    Anesthesia Type:  MAC ASA Status:  3          Anesthesia Type: MAC  Last vitals  BP   110/68 (05/23/18 0724)   Temp   98.1 °F (36.7 °C) (05/23/18 0724)   Pulse   98 (05/23/18 0724)   Resp   26 (05/23/18 0724)     SpO2   96 % (05/23/18 0724)     Post Anesthesia Care and Evaluation    Patient location during evaluation: bedside  Patient participation: complete - patient participated  Level of consciousness: awake and alert  Pain score: 0  Pain management: adequate  Airway patency: patent  Anesthetic complications: No anesthetic complications  PONV Status: none  Cardiovascular status: acceptable  Respiratory status: acceptable  Hydration status: acceptable    Comments: Blood pressure 110/68, pulse 98, temperature 98.1 °F (36.7 °C), temperature source Temporal Artery , resp. rate 26, height 162.6 cm (64\"), weight 56.2 kg (124 lb), SpO2 96 %.    Pt discharged from PACU based on elana score >8      "

## 2018-05-23 NOTE — PROGRESS NOTES
Patient's heart rate was better controlled yesterday and through the evening after increasing her long-acting diltiazem up to 360 mg daily.  She did undergo CHRISTINE-guided cardioversion this morning, which was successful.  However, within 1 hour, she did revert back to atrial fibrillation.  Please see CHRISTINE report for more detailed findings, but it confirmed my suspicion that the primary cardiac issue is right ventricular dysfunction and dilation due to pulmonary hypertension likely from her COPD.  She does appear stable for discharge from a cardiac standpoint, but I would recommend the followin.  Persistent atrial fibrillation: Continue rate control with 360 mg a diltiazem daily.  Continue Eliquis 5 mg twice a day for CVA prophylaxis.  Patient already has 30-day supply and hand from recent admission, and has completed patient assistance forms that she has no insurance drug coverage.  If, after 30 days are up and she cannot afford affordably obtain Eliquis, will need to discuss Coumadin again with her (though she is adamantly against taking it at this point).  If there are no other options, or she has any issues on anticoagulation, will consider for Watchman device (Left atrial appendage occluder) placement.    2.  CHF, right ventricular failure: Take Lasix as needed for lower extremity edema.    I look forward to seeing the patient in f/u in 4 weeks. Thank you for the opportunity to participate in the care of your patients.

## 2018-05-23 NOTE — NURSING NOTE
Discharge Planning Assessment  [facility]     Patient Name: Merari Gaspar  MRN: [id number]  Today's Date: 5/23/2018    Admit Date: 5/20/2018          Discharge Needs Assessment    No documentation.             Discharge Plan     Row Name 05/23/18 1441       Plan    Plan Referral for transition visit.         Destination     No service coordination in this encounter.      Durable Medical Equipment     No service coordination in this encounter.      Dialysis/Infusion     No service coordination in this encounter.      Home Medical Care     No service coordination in this encounter.      Social Care     No service coordination in this encounter.        Expected Discharge Date and Time     Expected Discharge Date Expected Discharge Time    May 22, 2018               Demographic Summary    No documentation.           Functional Status    No documentation.           Psychosocial    No documentation.           Abuse/Neglect    No documentation.           Legal    No documentation.           Substance Abuse    No documentation.           Patient Forms    No documentation.         Merlina A Fletcher, RN

## 2018-05-23 NOTE — PROGRESS NOTES
Continued Stay Note   [facility]     Patient Name: Merari Gaspar  MRN: [id number]  Today's Date: 5/23/2018    Admit Date: 5/20/2018          Discharge Plan     Row Name 05/23/18 0859       Plan    Plan PT continues to plan to dc home. She requires home O2. PT has requested to use [facility], as this is near her home. Orders have been sent and a portable tank will be delivered to PT's room prior to dc. PT denies any other needs.     Patient/Family in Agreement with Plan yes    Final Discharge Disposition Code 01 - home or self-care              Discharge Codes    No documentation.       Expected Discharge Date and Time     Expected Discharge Date Expected Discharge Time    May 22, 2018             COLLIN Lentz

## 2018-05-25 LAB
BACTERIA SPEC AEROBE CULT: NORMAL
BACTERIA SPEC AEROBE CULT: NORMAL

## 2018-06-05 ENCOUNTER — TELEPHONE (OUTPATIENT)
Dept: CARDIOLOGY | Facility: CLINIC | Age: 78
End: 2018-06-05

## 2018-06-05 NOTE — TELEPHONE ENCOUNTER
Patient  calling in stating that patient is taking Diltiazem 360 mg and went to see Dr. Javier and her feet are swelling, and states that he told them it was due to that medication.

## 2018-06-05 NOTE — TELEPHONE ENCOUNTER
Please see my note from 5/23 - her leg swelling is more likely due to right heart issues, not diltiazem.  If she quits taking her AF speed will undoubtedly get fast again

## 2018-06-06 NOTE — TELEPHONE ENCOUNTER
Patient notified, and states the lasix is not helping and swells up to where she cant walk or exercise like she was supposed to.

## 2018-06-07 NOTE — TELEPHONE ENCOUNTER
Patient  states that her legs were bad yesterday, and states she was taken an antibiotic for 6 days Clarithromy 500 mg to due with bacterial infection in her lungs and taking it twice daily, She has taken 6 doses so far. States she did not take it last night or this morning and her legs are not near as swollen as they were. Patient  states that Dr Javier said the lasix would not due any good to take for her swelling in her legs. Patient  states he thinks it might have been because of the Antibiotic, and was wondering what you thought about it?

## 2018-06-14 ENCOUNTER — TRANSCRIBE ORDERS (OUTPATIENT)
Dept: ADMINISTRATIVE | Facility: HOSPITAL | Age: 78
End: 2018-06-14

## 2018-06-14 DIAGNOSIS — Z13.6 ENCOUNTER FOR SCREENING FOR VASCULAR DISEASE: Primary | ICD-10-CM

## 2018-06-21 ENCOUNTER — OFFICE VISIT (OUTPATIENT)
Dept: CARDIOLOGY | Facility: CLINIC | Age: 78
End: 2018-06-21

## 2018-06-21 VITALS
HEART RATE: 81 BPM | HEIGHT: 64 IN | WEIGHT: 125 LBS | BODY MASS INDEX: 21.34 KG/M2 | OXYGEN SATURATION: 98 % | SYSTOLIC BLOOD PRESSURE: 132 MMHG | DIASTOLIC BLOOD PRESSURE: 62 MMHG

## 2018-06-21 DIAGNOSIS — I10 ESSENTIAL HYPERTENSION: Chronic | ICD-10-CM

## 2018-06-21 DIAGNOSIS — I51.9 RIGHT VENTRICULAR DYSFUNCTION: ICD-10-CM

## 2018-06-21 DIAGNOSIS — I48.91 ATRIAL FIBRILLATION WITH RVR (HCC): Primary | ICD-10-CM

## 2018-06-21 PROCEDURE — 93000 ELECTROCARDIOGRAM COMPLETE: CPT | Performed by: INTERNAL MEDICINE

## 2018-06-21 PROCEDURE — 99214 OFFICE O/P EST MOD 30 MIN: CPT | Performed by: INTERNAL MEDICINE

## 2018-06-21 RX ORDER — DILTIAZEM HYDROCHLORIDE 180 MG/1
360 CAPSULE, COATED, EXTENDED RELEASE ORAL DAILY
Qty: 60 CAPSULE | Refills: 11 | Status: SHIPPED | OUTPATIENT
Start: 2018-06-21 | End: 2019-05-27 | Stop reason: SDUPTHER

## 2018-06-21 NOTE — ASSESSMENT & PLAN NOTE
Asymptomatic with regards to palpitations or other symptoms from atrial fibrillation.      The patient does not have prescription drug coverage and had previously sent application for patient assistance to the company but has not yet heard back.  We will inquire as to the status of this application.  In the meantime, continue Eliquis 5mg BID, as well as diltiazem for rate control.  There was also an issue with her diltiazem medication, as the 360 mg of Cardizem CD she was prescribed was unaffordable.  Cardia  mg had previously been much more affordable, so I will switch her now to Cartia XT with instructions to take 2 capsules of the 180 mg capsules daily, for a total daily dose of 360 mg.

## 2018-06-21 NOTE — PROGRESS NOTES
Subjective:     Encounter Date:06/21/2018      Patient ID: Merari Gaspar is a 78 y.o. female.    Chief Complaint: Leg swelling, follow-up atrial fibrillation  Mrs. Gaspar is a pleaseant 78 year old I met during a May '18 admission for new onset AF with RVR. Was found on echo to have dilation of RV, and underwent CHRISTINE-guided DCCV back to NSR. However, she quickly reverted to AF within an hour. She was discharged on eliquis and cardizem 360mg for rate control, and today is her first f/u with me since discharge.  She has called our office a few times, complaining of leg swelling.  That is her major complaint today, and has been present intermittently now for the last few months.  She did note a worsening of it temporarily a few weeks ago while taking clarithromycin for a pulmonary infection; swelling did improve when this abx course concluded.  She did take Lasix for a while but did not notice much improvement.  She is using compression stockings per my direction with some improvement.    no buck, palpitations, orthopnea    The following portions of the patient's history were reviewed and updated as appropriate: allergies, current medications, past family history, past medical history, past social history, past surgical history and problem list.    Review of Systems   Constitution: Negative for malaise/fatigue.   Cardiovascular: Positive for leg swelling. Negative for chest pain, claudication, dyspnea on exertion, near-syncope, orthopnea, palpitations, paroxysmal nocturnal dyspnea and syncope.   Respiratory: Negative for shortness of breath.    Hematologic/Lymphatic: Does not bruise/bleed easily.          Objective:      Vitals:    06/21/18 0928   BP: 132/62   Pulse: 81   SpO2: 98%     Physical Exam   Constitutional: She is oriented to person, place, and time. She appears well-developed and well-nourished.   Neck: No JVD present.   Cardiovascular: Normal heart sounds and intact distal pulses.  An irregularly irregular  rhythm present. Tachycardia present.  Exam reveals no gallop.    No murmur heard.  Pulmonary/Chest: Effort normal and breath sounds normal.   Musculoskeletal: She exhibits no edema.   Neurological: She is alert and oriented to person, place, and time.   Skin: Skin is warm and dry.       Lab Review:         ECG 12 Lead  Date/Time: 6/21/2018 9:48 AM  Performed by: MARIA GUADALUPE KING  Authorized by: MARIA GUADALUPE KING   Comparison: compared with previous ECG from 5/23/2018  Comparison to previous ECG: AF has replaced NSR  Rhythm: atrial fibrillation  BPM: 107  QRS axis: right  Other findings comments: qR in V1 suggests RV conduction delay  Q waves: V1, V2, V3 and V4  Clinical impression: abnormal ECG            Results for orders placed during the hospital encounter of 05/20/18   Adult Transesophageal Echo (CHRISTINE) W/ Cont if Necessary Per Protocol    Narrative · Normal LVEF.  · Right ventricular cavity is dilated.  · Findings suggestive of pulmonary hypertension.  · Mild aortic valve regurgitation is present.  · Mild mitral valve regurgitation is present  · No thrombus in left atrial appendage with normal exit velocities - ok to   proceed with cardioversion.  · Negative bubble study.          Assessment/Plan:     Problem List Items Addressed This Visit        Cardiovascular and Mediastinum    Essential hypertension (Chronic)    Current Assessment & Plan     Hypertension is well controlled.  Continue current treatment regimen.  Dietary sodium restriction.  Blood pressure will be reassessed at the next regular appointment.         Relevant Medications    diltiaZEM CD (CARTIA XT) 180 MG 24 hr capsule    Atrial fibrillation with RVR - Primary    Overview     CHADS2-VASc=5 (age >75, female, HTN, CHF)         Current Assessment & Plan     Asymptomatic with regards to palpitations or other symptoms from atrial fibrillation.      The patient does not have prescription drug coverage and had previously sent application for patient  assistance to the company but has not yet heard back.  We will inquire as to the status of this application.  In the meantime, continue Eliquis 5mg BID, as well as diltiazem for rate control.  There was also an issue with her diltiazem medication, as the 360 mg of Cardizem CD she was prescribed was unaffordable.  Cardia  mg had previously been much more affordable, so I will switch her now to Cartia XT with instructions to take 2 capsules of the 180 mg capsules daily, for a total daily dose of 360 mg.         Relevant Medications    diltiaZEM CD (CARTIA XT) 180 MG 24 hr capsule    Other Relevant Orders    ECG 12 Lead    Right ventricular dysfunction    Current Assessment & Plan     Likely due to underlying lung disease.  I reviewed the physiology of this condition with her, and how it leads to occasional leg swelling.  I advised her to wear compression stockings, keep her legs elevated when possible, and adhere to a low sodium diet.  She could use Lasix as needed, though I would caution against it as a daily prescription.         Relevant Medications    diltiaZEM CD (CARTIA XT) 180 MG 24 hr capsule        F/u 3 months    Sohail Strong MD  06/21/2018  5:06 PM

## 2018-06-21 NOTE — ASSESSMENT & PLAN NOTE
Likely due to underlying lung disease.  I reviewed the physiology of this condition with her, and how it leads to occasional leg swelling.  I advised her to wear compression stockings, keep her legs elevated when possible, and adhere to a low sodium diet.  She could use Lasix as needed, though I would caution against it as a daily prescription.

## 2018-06-21 NOTE — ASSESSMENT & PLAN NOTE
Hypertension is well controlled.  Continue current treatment regimen.  Dietary sodium restriction.  Blood pressure will be reassessed at the next regular appointment.

## 2018-07-17 ENCOUNTER — HOSPITAL ENCOUNTER (OUTPATIENT)
Dept: ULTRASOUND IMAGING | Facility: HOSPITAL | Age: 78
Discharge: HOME | End: 2018-07-17
Attending: FAMILY MEDICINE | Admitting: FAMILY MEDICINE

## 2018-07-17 DIAGNOSIS — Z13.6 ENCOUNTER FOR SCREENING FOR VASCULAR DISEASE: ICD-10-CM

## 2018-07-17 PROCEDURE — 93799 UNLISTED CV SVC/PROCEDURE: CPT

## 2018-09-20 ENCOUNTER — OFFICE VISIT (OUTPATIENT)
Dept: CARDIOLOGY | Facility: CLINIC | Age: 78
End: 2018-09-20

## 2018-09-20 VITALS
WEIGHT: 135 LBS | HEART RATE: 76 BPM | HEIGHT: 64 IN | SYSTOLIC BLOOD PRESSURE: 102 MMHG | DIASTOLIC BLOOD PRESSURE: 67 MMHG | BODY MASS INDEX: 23.05 KG/M2

## 2018-09-20 DIAGNOSIS — I51.9 RIGHT VENTRICULAR DYSFUNCTION: ICD-10-CM

## 2018-09-20 DIAGNOSIS — I48.19 PERSISTENT ATRIAL FIBRILLATION (HCC): Primary | ICD-10-CM

## 2018-09-20 DIAGNOSIS — I10 ESSENTIAL HYPERTENSION: Chronic | ICD-10-CM

## 2018-09-20 PROCEDURE — 99214 OFFICE O/P EST MOD 30 MIN: CPT | Performed by: INTERNAL MEDICINE

## 2018-09-20 PROCEDURE — 93000 ELECTROCARDIOGRAM COMPLETE: CPT | Performed by: INTERNAL MEDICINE

## 2018-09-20 NOTE — PROGRESS NOTES
Subjective:     Encounter Date:09/20/2018      Patient ID: Merari Gaspar is a 78 y.o. female.    Chief Complaint: f/u afib  Mrs. Gaspar is a pleaseant 78 year old I met during a May '18 admission for new onset AF with RVR. Was found on echo to have dilation of RV, and underwent CHRISTINE-guided DCCV back to NSR. However, she quickly reverted to AF within an hour. She was discharged on eliquis and cardizem 360mg for rate control.    At our last visit on 6/21/18, her main complaint was leg swelling. I explained that this is likely from right ventricular dysfunction d/t underlying lung disease, and that the best treatment was elevation, compressions stockings, and lasix only as needed.  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      Today she returns for routine follow-up says that she is not getting all the aforementioned medications and affordable cost.  She would, however, like to get off as many medications as possible.  She has no current complaints, denying dyspnea, palpitations, angina, orthopnea, and PND.  She does still note some minor leg swelling, reports this generally improved with elevation and compression stocking use.      The following portions of the patient's history were reviewed and updated as appropriate: allergies, current medications, past family history, past medical history, past social history, past surgical history and problem list.    Review of Systems   Constitution: Negative for malaise/fatigue.   Cardiovascular: Positive for leg swelling. Negative for chest pain, claudication, dyspnea on exertion, near-syncope, orthopnea, palpitations, paroxysmal nocturnal dyspnea and syncope.   Respiratory: Negative for shortness of breath.    Hematologic/Lymphatic: Does not bruise/bleed easily.        Objective:      Vitals:    09/20/18 0938   BP: 102/67   Pulse: 76     Physical Exam   Constitutional:  She is oriented to person, place, and time. She appears well-developed and well-nourished.   Neck: No JVD present.   Cardiovascular: Normal rate, normal heart sounds and intact distal pulses.  An irregularly irregular rhythm present.   No murmur heard.  Pulmonary/Chest: Effort normal and breath sounds normal.   Musculoskeletal: She exhibits no edema.   Neurological: She is alert and oriented to person, place, and time.   Skin: Skin is warm and dry.       Lab Review:         ECG 12 Lead  Date/Time: 9/20/2018 10:10 AM  Performed by: MARIA GUADALUPE KING  Authorized by: MARIA GUADALUPE KING   Comparison: compared with previous ECG from 6/21/2018  Comparison to previous ECG: Vent rate slowed by 31 bpm  QRS axis: right  Q waves: V1, V2, V3 and V4  Clinical impression: abnormal ECG              Assessment/Plan:     Problem List Items Addressed This Visit        Cardiovascular and Mediastinum    Essential hypertension (Chronic)    Current Assessment & Plan     Well-controlled; continue Cartia XT         A-fib (CMS/[facility]) - Primary    Current Assessment & Plan     Asymptomatic/doing well when rate controlled. Discussed possible EP referral to [facility]; wants to wait for now    Cont eliquis and Cartia XT 360mg daily.  Patient wants to try to be on less medications, and asked if she could reduce her dose of diltiazem.  As she is just now exhibiting adequate rate control on this dose, I cautioned her against doing so but said that if she would like to take only one of the 2 pills, ie total dose of 180 mg, she could do so.  We would need to then monitor her ventricular rate to make sure that her resting rate remained adequate on this lower dose.          Right ventricular dysfunction    Overview     Likely due to underlying lung disease.           Current Assessment & Plan     I reviewed the physiology of this condition with her, and how it leads to occasional leg swelling.  I advised her to wear compression stockings, keep her legs elevated  when possible, and adhere to a low sodium diet. She could use Lasix as needed, though I would caution against it as a daily prescription.             F/u 6 months    Sohail Strong MD  09/20/2018  9:52 AM

## 2018-09-23 PROBLEM — I48.91 ATRIAL FIBRILLATION WITH RVR (HCC): Status: RESOLVED | Noted: 2018-05-17 | Resolved: 2018-09-23

## 2018-09-23 NOTE — ASSESSMENT & PLAN NOTE
Asymptomatic/doing well when rate controlled. Discussed possible EP referral to [facility]; wants to wait for now    Cont eliquis and Cartia XT 360mg daily.  Patient wants to try to be on less medications, and asked if she could reduce her dose of diltiazem.  As she is just now exhibiting adequate rate control on this dose, I cautioned her against doing so but said that if she would like to take only one of the 2 pills, ie total dose of 180 mg, she could do so.  We would need to then monitor her ventricular rate to make sure that her resting rate remained adequate on this lower dose.

## 2018-09-23 NOTE — ASSESSMENT & PLAN NOTE
I reviewed the physiology of this condition with her, and how it leads to occasional leg swelling.  I advised her to wear compression stockings, keep her legs elevated when possible, and adhere to a low sodium diet. She could use Lasix as needed, though I would caution against it as a daily prescription.

## 2018-11-23 PROBLEM — J98.4 SCARRING OF LUNG: Status: ACTIVE | Noted: 2018-11-23

## 2018-11-23 PROBLEM — Z87.891 PERSONAL HISTORY OF TOBACCO USE, PRESENTING HAZARDS TO HEALTH: Status: ACTIVE | Noted: 2018-11-23

## 2018-11-23 PROBLEM — J44.9 CHRONIC OBSTRUCTIVE PULMONARY DISEASE (HCC): Status: ACTIVE | Noted: 2018-11-23

## 2018-11-23 NOTE — PROGRESS NOTES
[facility]  [facility]   Res[facility]  [address] [place] [address]  Phone: [phone]  Fax: [phone]     Merari Gaspar is a 78 y.o. female.   CC:   Chief Complaint   Patient presents with   • COPD        HPI: The patient is here for a three-month follow-up with a history of severe COPD, lung scarring, chronic atrial fibrillation and a personal history of nicotine dependency.  She quit smoking earlier this year and smoked for almost 61 years.  She reports that currently her atrial fibrillation is well controlled.  She has been tried on both Spiriva Respimat and Tudorza and prefers Spiriva.  She has been relying on samples and therefore has not been taking Spiriva on a daily basis.  She had follow-up PFTs done today that show FEV1 has dropped from 46% predicted to 38% predicted and she has been strongly encouraged to take a long acting anticholinergic daily.  She has been provided with additional samples of Spiriva and a prescription has been sent to her pharmacy.  Unfortunately the patient does not currently have prescription coverage and she is not sure if she will be able to afford the out-of-pocket expense.  She is going to check through the drug company's website to see if there is a patient assistance program that she would qualify for.  Additionally she was interested in having a low dose CT lung cancer screening done but it does not appear that that will be approved due to Medicare guidelines and the patient being 78 years old.  Nonetheless a CT of the chest has been ordered to be done in 6 months given the patient's long smoking history.  She is followed by Dr. Cage for routine medical care and she is up-to-date on both flu and pneumonia vaccines.    The following portions of the patient's history were reviewed and updated as appropriate: allergies, current medications, past family history, past medical history, past social history, past  surgical history and problem list.    Past Medical History:   Diagnosis Date   • Atrial fibrillation (CMS/HCC)    • COPD (chronic obstructive pulmonary disease) (CMS/HCC)    • Hypercholesteremia    • Hypertension    • Smoker        Family History   Problem Relation Age of Onset   • No Known Problems Mother    • No Known Problems Father        Social History     Socioeconomic History   • Marital status:      Spouse name: Not on file   • Number of children: Not on file   • Years of education: Not on file   • Highest education level: Not on file   Social Needs   • Financial resource strain: Not on file   • Food insecurity - worry: Not on file   • Food insecurity - inability: Not on file   • Transportation needs - medical: Not on file   • Transportation needs - non-medical: Not on file   Occupational History   • Not on file   Tobacco Use   • Smoking status: Former Smoker     Packs/day: 0.50   • Smokeless tobacco: Never Used   • Tobacco comment: 32 days ago    Substance and Sexual Activity   • Alcohol use: No   • Drug use: No   • Sexual activity: Not on file   Other Topics Concern   • Not on file   Social History Narrative   • Not on file       Review of Systems   Constitutional: Negative for appetite change, chills, fatigue and fever.   HENT: Negative for trouble swallowing and voice change.    Eyes: Negative for visual disturbance.   Respiratory: Negative for cough, shortness of breath and wheezing.    Cardiovascular: Negative for chest pain and leg swelling.   Gastrointestinal: Negative for abdominal distention, abdominal pain, nausea and vomiting.   Genitourinary: Negative.    Musculoskeletal: Negative for gait problem and myalgias.   Skin: Negative.    Neurological: Negative for weakness.   Hematological: Negative.    Psychiatric/Behavioral: The patient is not nervous/anxious.        Vitals:    11/26/18 1517   BP: 128/80   Pulse: 78   SpO2: 93%       Physical Exam   Constitutional: She is oriented to person,  place, and time. She appears well-developed and well-nourished. No distress.   HENT:   Head: Normocephalic and atraumatic.   Eyes: Pupils are equal, round, and reactive to light. No scleral icterus.   Neck: Normal range of motion. Neck supple.   Cardiovascular: Normal rate, regular rhythm, S1 normal and S2 normal.   Pulmonary/Chest: Effort normal. No tachypnea. She has decreased breath sounds (At the bases). She has no wheezes. She has no rhonchi. She has no rales.   Abdominal: Soft. Bowel sounds are normal. She exhibits no distension.   Musculoskeletal: Normal range of motion. She exhibits no edema.   Lymphadenopathy:     She has no cervical adenopathy.   Neurological: She is alert and oriented to person, place, and time.   Skin: Skin is warm and dry. No rash noted.   Psychiatric: She has a normal mood and affect. Her behavior is normal.   Vitals reviewed.      My interpretation of  Pulmonary Functions Testing: A severe obstructive defect with a FEV1 of 38% predicted.  Actual FEV1/FVC is 60.00.  The patient has severe, Gold stage III COPD.  She has had an overall drop in lung function over the past 3 months and has been encouraged to utilize her long acting anticholinergic on a daily basis.    FEV1   Date Value Ref Range Status   11/26/2018 38% liters Final     FVC   Date Value Ref Range Status   11/26/2018 48% liters Final     FEV1/FVC   Date Value Ref Range Status   11/26/2018 60.00% % Final       Merari was seen today for copd.    Diagnoses and all orders for this visit:    Scarring of lung  -     CT Chest Without Contrast; Future    Chronic obstructive pulmonary disease, unspecified COPD type (CMS/[facility])  Comments:  severe, stage 3  Orders:  -     Cancel: Pulmonary Function Test; Future  -     Pulmonary Function Test  -     CT Chest Without Contrast; Future    Personal history of tobacco use, presenting hazards to health  Comments:  quit in 2018  Orders:  -     CT Chest Without Contrast; Future    Other orders  -      Cancel: CT Chest Low Dose Wo; Future  -     Discontinue: tiotropium bromide-olodaterol (STIOLTO RESPIMAT) 2.5-2.5 MCG/ACT aerosol solution inhaler; Inhale 2 puffs Daily for 30 days.  -     ipratropium (ATROVENT) 0.02 % nebulizer solution; Take 2.5 mL by nebulization 4 (Four) Times a Day As Needed for Wheezing or Shortness of Air for up to 30 days.  -     tiotropium bromide monohydrate (SPIRIVA RESPIMAT) 2.5 MCG/ACT aerosol solution inhaler; Inhale 2 puffs Daily.  -     tiotropium bromide monohydrate (SPIRIVA RESPIMAT) 2.5 MCG/ACT aerosol solution inhaler; Inhale 2 puffs Daily.      Patient's Body mass index is 24.37 kg/m². BMI is within normal parameters. No follow-up required.    She was provided samples of Spiriva Respimat and encouraged to use daily.  Additionally a prescription for Atrovent to use in her nebulizer on an as-needed basis was sent to her pharmacy.  The patient will try and avoid beta 2 agonist bronchodilators due to her history of atrial fibrillation.    Recommend 6 month follow-up with flow volume loop on return.    Jostin Mcqueen, APRN       11/26/2018  4:29 PM

## 2018-11-26 ENCOUNTER — OFFICE VISIT (OUTPATIENT)
Dept: PULMONOLOGY | Facility: CLINIC | Age: 78
End: 2018-11-26

## 2018-11-26 VITALS
HEIGHT: 64 IN | DIASTOLIC BLOOD PRESSURE: 80 MMHG | WEIGHT: 142 LBS | SYSTOLIC BLOOD PRESSURE: 128 MMHG | HEART RATE: 78 BPM | OXYGEN SATURATION: 93 % | BODY MASS INDEX: 24.24 KG/M2

## 2018-11-26 DIAGNOSIS — Z87.891 PERSONAL HISTORY OF TOBACCO USE, PRESENTING HAZARDS TO HEALTH: ICD-10-CM

## 2018-11-26 DIAGNOSIS — J44.9 CHRONIC OBSTRUCTIVE PULMONARY DISEASE, UNSPECIFIED COPD TYPE (HCC): ICD-10-CM

## 2018-11-26 DIAGNOSIS — J98.4 SCARRING OF LUNG: Primary | ICD-10-CM

## 2018-11-26 LAB
FEV1/FVC: NORMAL %
FEV1: NORMAL LITERS
FVC VOL RESPIRATORY: NORMAL LITERS

## 2018-11-26 PROCEDURE — 94010 BREATHING CAPACITY TEST: CPT | Performed by: NURSE PRACTITIONER

## 2018-11-26 PROCEDURE — 99214 OFFICE O/P EST MOD 30 MIN: CPT | Performed by: NURSE PRACTITIONER

## 2018-11-26 NOTE — PATIENT INSTRUCTIONS
Lung Cancer Screening  A lung cancer screening is a test that checks for lung cancer. Lung cancer screening is done to look for lung cancer in its very early stages, before it spreads and becomes harder to treat and before symptoms appear. Finding cancer early improves the chances of successful treatment. It may save your life.  Should I be screened for lung cancer?  You should be screened for lung cancer if all of these apply:  · You currently smoke or you have quit smoking within the past 15 years.  · You are 55-74 years old. Screening may be recommended up to age 80 depending on your overall health and other factors.  · You are in good general health.  · You have a 30-pack-year smoking history.    To find your pack-year history, multiply how many packages of cigarettes you smoked each day by the number of years you smoked. For example, if you smoked two packs of cigarettes each day for 15 years, your pack-year history is 30. If you are not sure what your pack-year smoking history is, ask your health care provider.  Screening may also be recommended if you are at high risk for the disease. You may be at high risk if:  · You have a family history of lung cancer.  · You have been exposed to asbestos.  · You have chronic obstructive pulmonary disease (COPD).  · You have a history of previous lung cancer.    How often should I be screened for lung cancer?  If you are at risk for lung cancer, it is recommended that you are screened once a year. The recommended screening test is a low-dose CT scan.  How can I lower my risk of lung cancer?  To lower your risk of developing lung cancer:  · If you smoke, stop smoking all tobacco products.  · Avoid secondhand smoke.  · Avoid exposure to radiation.  · Avoid exposure to radon gas. Have your home checked for radon regularly.  · Avoid things that cause cancer (carcinogens).  · Avoid living or working in places with high air pollution.    Where to find more information:  Ask  your health care provider about the risks and benefits of screening. More information and resources are available from these organizations:  · American Cancer Society (ACS): www.cancer.org  · American Lung Association: www.lung.org    Contact a health care provider if:  · You start to show symptoms of lung cancer, including:  ? Coughing that will not go away.  ? Wheezing.  ? Chest pain.  ? Coughing up blood.  ? Shortness of breath.  ? Weight loss that cannot be explained.  ? Constant fatigue.  Summary  · Lung cancer screening may find lung cancer before symptoms appear. Finding cancer early improves the chances of successful treatment. It may save your life.  · If you are at risk for lung cancer, it is recommended that you are screened once a year. The recommended screening test is a low-dose CT scan.  · You can make lifestyle changes to lower your risk of lung cancer.  · Ask your health care provider about the risks and benefits of screening.  This information is not intended to replace advice given to you by your health care provider. Make sure you discuss any questions you have with your health care provider.  Document Released: 11/08/2017 Document Revised: 11/08/2017 Document Reviewed: 11/08/2017  [employer]vier Interactive Patient Education © 2018 [employer]

## 2019-03-18 NOTE — PROGRESS NOTES
Subjective:     Encounter Date: 3/19/19      Patient ID: Merari Gaspar is a 79 y.o. female.    Chief Complaint: f/u afib  Mrs. Gaspar is a pleaseant 78 year old I met during a May '18 admission for new onset AF with RVR. Was found on echo to have dilation of RV, and underwent CHRISTINE-guided DCCV back to NSR. However, she quickly reverted to AF within an hour. She was discharged on eliquis and cardizem 360mg for rate control.    At our last visit on 6/21/18, her main complaint was leg swelling. I explained that this is likely from right ventricular dysfunction d/t underlying lung disease, and that the best treatment was elevation, compressions stockings, and lasix only as needed.  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      At our last visit on September 20, 2018, she was doing well.  She did still note some minor leg swelling, but reported that this generally improved with elevation and compression stocking use.    She is interested in Watchman - but not having any troubles with Eliquis.        The following portions of the patient's history were reviewed and updated as appropriate: allergies, current medications, past family history, past medical history, past social history, past surgical history and problem list.    Review of Systems   Constitution: Negative for malaise/fatigue.   Cardiovascular: Positive for leg swelling. Negative for chest pain, claudication, dyspnea on exertion, near-syncope, orthopnea, palpitations, paroxysmal nocturnal dyspnea and syncope.   Respiratory: Negative for shortness of breath.    Hematologic/Lymphatic: Does not bruise/bleed easily.        Objective:      Vitals:    03/19/19 1141   BP: 92/60   Pulse: 95     Physical Exam   Constitutional: She is oriented to person, place, and time. She appears well-developed and well-nourished.   Neck: No JVD present.   Cardiovascular: Normal  rate, normal heart sounds and intact distal pulses. An irregularly irregular rhythm present.   No murmur heard.  Pulmonary/Chest: Effort normal and breath sounds normal.   Musculoskeletal: She exhibits no edema.   Neurological: She is alert and oriented to person, place, and time.   Skin: Skin is warm and dry.       Lab Review:         ECG 12 Lead  Date/Time: 3/19/2019 12:17 PM  Performed by: Sohail Strong MD  Authorized by: Sohail Strong MD   Comparison: compared with previous ECG   Rhythm: atrial fibrillation  BPM: 95  Q waves: V1, V2 and V3      Clinical impression: abnormal EKG              Assessment/Plan:     Problem List Items Addressed This Visit  (all established, stable)       Cardiovascular and Mediastinum    Essential hypertension (Chronic)    A-fib (CMS/HCC) - Primary    Overview     CHADS-VASc Risk Assessment            4       Total Score        1 Hypertension    2 Age >/= 75    1 Sex: Female                 Relevant Orders    ECG 12 Lead    Right ventricular dysfunction    Overview     Likely due to underlying lung disease.                 Recommendations/plans  -Continue eliquis 5mg po bid  -continue cartia xt 180mg daily      F/u 12 months    Sohail Strong MD  3/19/19  12:23 PM

## 2019-03-19 ENCOUNTER — OFFICE VISIT (OUTPATIENT)
Dept: CARDIOLOGY | Facility: CLINIC | Age: 79
End: 2019-03-19

## 2019-03-19 VITALS
HEIGHT: 63 IN | SYSTOLIC BLOOD PRESSURE: 92 MMHG | DIASTOLIC BLOOD PRESSURE: 60 MMHG | HEART RATE: 95 BPM | WEIGHT: 142 LBS | BODY MASS INDEX: 25.16 KG/M2

## 2019-03-19 DIAGNOSIS — I51.9 RIGHT VENTRICULAR DYSFUNCTION: ICD-10-CM

## 2019-03-19 DIAGNOSIS — I48.21 PERMANENT ATRIAL FIBRILLATION (HCC): Primary | ICD-10-CM

## 2019-03-19 DIAGNOSIS — I10 ESSENTIAL HYPERTENSION: Chronic | ICD-10-CM

## 2019-03-19 PROCEDURE — 93000 ELECTROCARDIOGRAM COMPLETE: CPT | Performed by: INTERNAL MEDICINE

## 2019-03-19 PROCEDURE — 99214 OFFICE O/P EST MOD 30 MIN: CPT | Performed by: INTERNAL MEDICINE

## 2019-05-14 PROBLEM — Z87.891 FORMER SMOKER: Status: ACTIVE | Noted: 2019-05-14

## 2019-05-15 NOTE — PROGRESS NOTES
" SAMMY Hodges  [facility]   Respiratory Disease Clinic  [address] [place] [address]  Phone: [phone]  Fax: [phone]     Merari Gaspar is a 79 y.o. female.   CC:   Chief Complaint   Patient presents with   • follow up of scarring of lung        HPI: She wears oxygen at night and needs O2 recertification.  She does not qualify for daytime oxygen so we will get an overnight pulse ox study to be done on room air with her local DME company.  She reports good compliance wearing her oxygen every night and feels that it is helpful.  She recently had cataract surgery so she did not have pulmonary function testing done today.  She reports that her breathing is \"great\".  She uses Spiriva more on an as-needed basis and has albuterol neb treatments to do as well.  She does have a history of severe COPD with lung scarring and being a former longtime smoker who quit this past year after smoking for 61 years.  She has a history of chronic A. fib and because of that we are avoiding any beta 2 agonist inhalers with her.  She had previously not had prescription coverage on her insurance but was able to obtain some at the beginning of this year.  She is scheduled to have a CT of the chest later this month I told her when I get those results back that we will call her.  She sees Dr. Javier for routine medical care and she is up-to-date on her flu and pneumonia vaccine.  She is accompanied today by her .    The following portions of the patient's history were reviewed and updated as appropriate: allergies, current medications, past family history, past medical history, past social history, past surgical history and problem list.    Past Medical History:   Diagnosis Date   • Atrial fibrillation (CMS/HCC)    • COPD (chronic obstructive pulmonary disease) (CMS/HCC)    • Hypercholesteremia    • Hypertension    • Smoker        Family History   Problem Relation Age of Onset   • No Known " "Problems Mother    • No Known Problems Father        Social History     Socioeconomic History   • Marital status:      Spouse name: Not on file   • Number of children: Not on file   • Years of education: Not on file   • Highest education level: Not on file   Tobacco Use   • Smoking status: Former Smoker     Packs/day: 0.50   • Smokeless tobacco: Never Used   • Tobacco comment: 32 days ago    Substance and Sexual Activity   • Alcohol use: No   • Drug use: No       Review of Systems   Constitutional: Negative for appetite change, chills, fatigue and fever.   HENT: Negative for trouble swallowing and voice change.    Eyes: Negative for visual disturbance.   Respiratory: Negative for cough, shortness of breath and wheezing.    Cardiovascular: Negative for chest pain and leg swelling.   Gastrointestinal: Negative for abdominal distention, abdominal pain, nausea and vomiting.   Genitourinary: Negative.    Musculoskeletal: Negative for gait problem and myalgias.   Skin: Negative.    Neurological: Negative for weakness.   Hematological: Negative.    Psychiatric/Behavioral: The patient is not nervous/anxious.        /82   Pulse 76   Ht 160 cm (63\")   Wt 63.5 kg (140 lb)   SpO2 96% Comment: RA  BMI 24.80 kg/m²     Physical Exam   Constitutional: She is oriented to person, place, and time. She appears well-developed and well-nourished. No distress.   HENT:   Head: Normocephalic and atraumatic.   Eyes: Pupils are equal, round, and reactive to light. No scleral icterus.   Neck: Normal range of motion. Neck supple.   Cardiovascular: Normal rate, regular rhythm, S1 normal and S2 normal.   Pulmonary/Chest: Effort normal and breath sounds normal. No tachypnea. She has no wheezes. She has no rhonchi. She has no rales.   Abdominal: Soft. Bowel sounds are normal. She exhibits no distension.   Musculoskeletal: Normal range of motion. She exhibits no edema.   Lymphadenopathy:     She has no cervical adenopathy. "   Neurological: She is alert and oriented to person, place, and time.   Skin: Skin is warm and dry. No rash noted. No cyanosis. Nails show no clubbing.   Psychiatric: She has a normal mood and affect. Her behavior is normal.   Vitals reviewed.        FEV1   Date Value Ref Range Status   11/26/2018 38% liters Final     FVC   Date Value Ref Range Status   11/26/2018 48% liters Final     FEV1/FVC   Date Value Ref Range Status   11/26/2018 60.00% % Final       Merari was seen today for follow up of scarring of lung.    Diagnoses and all orders for this visit:    Stage 3 severe COPD by GOLD classification (CMS/[facility])  -     Walking Oximetry; Future  -     Walking Oximetry  -     tiotropium bromide monohydrate (SPIRIVA RESPIMAT) 2.5 MCG/ACT aerosol solution inhaler; Inhale 2 puffs Daily.    Scarring of lung    Former smoker    Chronic atrial fibrillation (CMS/[facility])    Nocturnal hypoxemia  -     Overnight Sleep Oximetry Study; Future    Other orders  -     Cancel: Pulmonary Function Test      Patient's Body mass index is 24.8 kg/m². BMI is within normal parameters. No follow-up required..      Follow-up/Plan: She is due to have a CT of the chest later this month, will call those results to her.  An overnight pulse ox study to be done on room air has been ordered to requalify her for nocturnal oxygen.  Continue on Spiriva and oxygen (at night only).  Albuterol nebs as needed.  Return to clinic in 6 months with flow volume loop on return.  If she is doing well in 6 months she can go to annual visits.    Jostin Mcqueen, SAMMY  5/16/2019  1:23 PM

## 2019-05-16 ENCOUNTER — OFFICE VISIT (OUTPATIENT)
Dept: PULMONOLOGY | Facility: CLINIC | Age: 79
End: 2019-05-16

## 2019-05-16 VITALS
DIASTOLIC BLOOD PRESSURE: 82 MMHG | WEIGHT: 140 LBS | HEIGHT: 63 IN | SYSTOLIC BLOOD PRESSURE: 122 MMHG | HEART RATE: 76 BPM | BODY MASS INDEX: 24.8 KG/M2 | OXYGEN SATURATION: 96 %

## 2019-05-16 DIAGNOSIS — I48.20 CHRONIC ATRIAL FIBRILLATION (HCC): ICD-10-CM

## 2019-05-16 DIAGNOSIS — Z87.891 FORMER SMOKER: ICD-10-CM

## 2019-05-16 DIAGNOSIS — G47.34 NOCTURNAL HYPOXEMIA: ICD-10-CM

## 2019-05-16 DIAGNOSIS — J44.9 STAGE 3 SEVERE COPD BY GOLD CLASSIFICATION (HCC): Primary | ICD-10-CM

## 2019-05-16 DIAGNOSIS — J98.4 SCARRING OF LUNG: ICD-10-CM

## 2019-05-16 LAB — SAO2 % BLD: NORMAL %

## 2019-05-16 PROCEDURE — 99214 OFFICE O/P EST MOD 30 MIN: CPT | Performed by: NURSE PRACTITIONER

## 2019-05-17 ENCOUNTER — TELEPHONE (OUTPATIENT)
Dept: PULMONOLOGY | Facility: CLINIC | Age: 79
End: 2019-05-17

## 2019-05-17 NOTE — TELEPHONE ENCOUNTER
They are wanting to have her portable tanks picked up. If okay to d/c please send to [facility].

## 2019-05-20 NOTE — TELEPHONE ENCOUNTER
Pt is doing an overnight.  Waiting on those results until we d/c o2.  Called patient.  No answer.  Left message for them to call me back.

## 2019-05-20 NOTE — TELEPHONE ENCOUNTER
----- Message from Wallace Vargas MD sent at 1/7/2019  9:40 AM CST -----  Please let patient know his d-dimer was high at 1.12. Plz fax results to [facility] for further recommendations   They are aware we are waiting to get results before any d/c orders.

## 2019-05-21 DIAGNOSIS — G47.34 NOCTURNAL HYPOXEMIA: ICD-10-CM

## 2019-05-21 NOTE — TELEPHONE ENCOUNTER
I am sending this message to both Daksha and Ethan because there will be a result note going to Daksha about the overnight pulse ox study.  She has requalified for nocturnal oxygen.  She needs to have some type of back-up portable device in case of a power outage so they do not need to  all of her portable oxygen tanks.

## 2019-05-28 ENCOUNTER — HOSPITAL ENCOUNTER (OUTPATIENT)
Dept: CT IMAGING | Facility: HOSPITAL | Age: 79
Discharge: HOME | End: 2019-05-28
Admitting: NURSE PRACTITIONER

## 2019-05-28 ENCOUNTER — TELEPHONE (OUTPATIENT)
Dept: PULMONOLOGY | Facility: CLINIC | Age: 79
End: 2019-05-28

## 2019-05-28 DIAGNOSIS — J98.4 SCARRING OF LUNG: ICD-10-CM

## 2019-05-28 DIAGNOSIS — J44.9 CHRONIC OBSTRUCTIVE PULMONARY DISEASE, UNSPECIFIED COPD TYPE (HCC): ICD-10-CM

## 2019-05-28 DIAGNOSIS — Z87.891 PERSONAL HISTORY OF TOBACCO USE, PRESENTING HAZARDS TO HEALTH: ICD-10-CM

## 2019-05-28 PROCEDURE — 71250 CT THORAX DX C-: CPT

## 2019-05-28 RX ORDER — DILTIAZEM HYDROCHLORIDE 180 MG/1
CAPSULE, EXTENDED RELEASE ORAL
Qty: 60 CAPSULE | Refills: 11 | Status: SHIPPED | OUTPATIENT
Start: 2019-05-28 | End: 2020-03-11 | Stop reason: SDUPTHER

## 2019-05-28 NOTE — TELEPHONE ENCOUNTER
PATIENTS  CALLED AND STATED THAT FROM THE RESULTS OF HER SLEEP STUDY SHE ONLY NEEDED O2 AT NIGHT. THE OXYGEN COMPANY WILL NOT TAKE BACK THE PORTABLE WITHOUT A NOTE FROM THE PROVIDER.

## 2019-11-17 NOTE — PROGRESS NOTES
SAMMY Hodges  [facility]   Respiratory Disease Clinic  [address] [place] [address]  Phone: [phone]  Fax: [phone]     Merari Gsapar is a 79 y.o. female.   CC:   Chief Complaint   Patient presents with   • COPD        HPI: She has been coughing up colored sputum first thing in the morning but then does not have any issue throughout the day.  She quit smoking just over 2 years ago.  She is being managed for history of severe COPD, emphysema and lung scarring.  She has been participating in yoga and feels that that is benefiting her greatly.  She uses Spiriva and albuterol on an as-needed basis.  She will no longer qualify for an annual low-dose CT scan based on her age but she will get a chest x-ray given her long smoking history.  She wears oxygen at night and feels that she benefits from that.  She is followed by Dr. Javier for routine medical care and she stays up-to-date on flu and pneumonia vaccines.    The following portions of the patient's history were reviewed and updated as appropriate: allergies, current medications, past family history, past medical history, past social history, past surgical history and problem list.    Past Medical History:   Diagnosis Date   • Atrial fibrillation (CMS/HCC)    • COPD (chronic obstructive pulmonary disease) (CMS/HCC)    • Hypercholesteremia    • Hypertension    • Smoker        Family History   Problem Relation Age of Onset   • No Known Problems Mother    • No Known Problems Father        Social History     Socioeconomic History   • Marital status:      Spouse name: Not on file   • Number of children: Not on file   • Years of education: Not on file   • Highest education level: Not on file   Tobacco Use   • Smoking status: Former Smoker     Packs/day: 0.50     Years: 61.00     Pack years: 30.50     Last attempt to quit: 2017     Years since quittin.8   • Smokeless tobacco: Former User   Substance and Sexual  "Activity   • Alcohol use: No   • Drug use: No       Review of Systems   Constitutional: Negative for appetite change, chills, fatigue and fever.   HENT: Negative for swollen glands, trouble swallowing and voice change.    Eyes: Negative for visual disturbance.   Respiratory: Positive for cough (only in the morning, productive) and shortness of breath. Negative for wheezing.    Cardiovascular: Negative for chest pain and leg swelling.   Gastrointestinal: Negative for abdominal distention, abdominal pain, nausea and vomiting.   Genitourinary: Negative.    Musculoskeletal: Negative for gait problem and myalgias.   Skin: Negative.    Neurological: Negative for weakness.   Hematological: Negative.    Psychiatric/Behavioral: The patient is not nervous/anxious.        /78   Pulse 114   Ht 160 cm (63\")   Wt 66.2 kg (146 lb)   SpO2 96% Comment: RA  Breastfeeding? No   BMI 25.86 kg/m²     Physical Exam   Constitutional: She is oriented to person, place, and time. She appears well-developed and well-nourished. No distress.   HENT:   Head: Normocephalic and atraumatic.   Eyes: Pupils are equal, round, and reactive to light. No scleral icterus.   Neck: Normal range of motion. Neck supple.   Cardiovascular: Normal rate, regular rhythm, S1 normal and S2 normal.   Pulmonary/Chest: Effort normal. No tachypnea. She has decreased breath sounds (at bases, otherwise clear). She has no wheezes. She has no rhonchi. She has no rales.   Abdominal: Soft. Bowel sounds are normal. She exhibits no distension.   Musculoskeletal: Normal range of motion. She exhibits no edema.   Lymphadenopathy:     She has no cervical adenopathy.   Neurological: She is alert and oriented to person, place, and time.   Skin: Skin is warm and dry. No rash noted. No cyanosis. Nails show no clubbing.   Psychiatric: She has a normal mood and affect. Her behavior is normal.   Vitals reviewed.      Pulmonary Function Test  Performed by: Charisse" SAMMY Frankel  Authorized by: Jostin Mcqueen APRN      Pre Drug    FVC: 53%   FEV1: 44%   FEF 25-75%: 24%   FEV1/FVC: 62.9%    My interpretation of  Pulmonary Functions Testing: Spirometry shows a severe obstructive defect with FEV1 of 44% predicted.  Mid flows are decreased at 24% predicted.  Actual FEV1/FVC is 62.90.  When compared to pulmonary function testing from last year her lung function has remained stable.    Merari was seen today for copd.    Diagnoses and all orders for this visit:    Stage 3 severe COPD by GOLD classification (CMS/[facility])  -     Pulmonary Function Test    Nocturnal hypoxemia    Scarring of lung    Pulmonary emphysema, unspecified emphysema type (CMS/[facility])    Former smoker      Patient's Body mass index is 25.86 kg/m². BMI is within normal parameters. No follow-up required.. Continue yoga.       Follow-up/Plan: Continue Spiriva and albuterol as needed.  She will no longer qualify for annual low-dose CT because of her age.  She will get a chest x-ray at [facility] and we will call those results to her.  Return to clinic in 6 months.  Continue exercising.     SAMMY Hodges  11/19/2019  11:05 AM

## 2019-11-19 ENCOUNTER — OFFICE VISIT (OUTPATIENT)
Dept: PULMONOLOGY | Facility: CLINIC | Age: 79
End: 2019-11-19

## 2019-11-19 VITALS
BODY MASS INDEX: 25.87 KG/M2 | HEIGHT: 63 IN | OXYGEN SATURATION: 96 % | HEART RATE: 114 BPM | WEIGHT: 146 LBS | DIASTOLIC BLOOD PRESSURE: 78 MMHG | SYSTOLIC BLOOD PRESSURE: 122 MMHG

## 2019-11-19 DIAGNOSIS — J43.9 PULMONARY EMPHYSEMA, UNSPECIFIED EMPHYSEMA TYPE (HCC): ICD-10-CM

## 2019-11-19 DIAGNOSIS — J44.9 STAGE 3 SEVERE COPD BY GOLD CLASSIFICATION (HCC): Primary | ICD-10-CM

## 2019-11-19 DIAGNOSIS — Z87.891 FORMER SMOKER: ICD-10-CM

## 2019-11-19 DIAGNOSIS — J98.4 SCARRING OF LUNG: ICD-10-CM

## 2019-11-19 DIAGNOSIS — G47.34 NOCTURNAL HYPOXEMIA: ICD-10-CM

## 2019-11-19 PROCEDURE — 99214 OFFICE O/P EST MOD 30 MIN: CPT | Performed by: NURSE PRACTITIONER

## 2019-11-19 PROCEDURE — 94010 BREATHING CAPACITY TEST: CPT | Performed by: NURSE PRACTITIONER

## 2019-11-19 RX ORDER — FUROSEMIDE 20 MG/1
20 TABLET ORAL DAILY PRN
Refills: 0 | COMMUNITY
Start: 2019-10-03

## 2019-11-19 NOTE — PROCEDURES
Pulmonary Function Test  Performed by: Jostin Mcqueen APRN  Authorized by: Jostin Mcqueen APRN      Pre Drug    FVC: 53%   FEV1: 44%   FEF 25-75%: 24%   FEV1/FVC: 62.9%

## 2019-11-21 DIAGNOSIS — J98.4 SCARRING OF LUNG: ICD-10-CM

## 2019-11-21 DIAGNOSIS — Z87.891 FORMER SMOKER: ICD-10-CM

## 2019-11-21 DIAGNOSIS — J43.9 PULMONARY EMPHYSEMA, UNSPECIFIED EMPHYSEMA TYPE (HCC): ICD-10-CM

## 2019-11-21 DIAGNOSIS — J44.9 STAGE 3 SEVERE COPD BY GOLD CLASSIFICATION (HCC): ICD-10-CM

## 2020-03-11 RX ORDER — DILTIAZEM HYDROCHLORIDE 180 MG/1
360 CAPSULE, COATED, EXTENDED RELEASE ORAL DAILY
Qty: 180 CAPSULE | Refills: 4 | Status: SHIPPED | OUTPATIENT
Start: 2020-03-11 | End: 2020-03-17 | Stop reason: SDUPTHER

## 2020-03-17 RX ORDER — DILTIAZEM HYDROCHLORIDE 180 MG/1
360 CAPSULE, COATED, EXTENDED RELEASE ORAL DAILY
Qty: 180 CAPSULE | Refills: 4 | Status: SHIPPED | OUTPATIENT
Start: 2020-03-17 | End: 2020-05-26 | Stop reason: SDUPTHER

## 2020-05-14 NOTE — PROGRESS NOTES
SAMMY Hodges  [facility]   Respiratory Disease Clinic  [address] [place] [address]  Phone: [phone]  Fax: [phone]     Merari Gaspar is a 80 y.o. female.   CC:   Chief Complaint   Patient presents with   • STAGE 3 SEVERE COPD        HPI: This visit has been rescheduled as a phone visit to comply with patient safety concerns in accordance with CDC recommendations. Total time of discussion was 9 minutes.  She reports that she is doing very well.  She and her  continue to exercise several times a week.  She does have severe COPD, nocturnal hypoxia, emphysema and lung scarring.  She is a former smoker who quit in 2017.  She utilizes Spiriva daily and currently is not requiring any form of albuterol.  She does wear her oxygen at night.  She stays up-to-date on all of her vaccines.  I do recommend that she get an updated Pneumovax 23 this fall around the time that she gets her flu shot.    The following portions of the patient's history were reviewed and updated as appropriate: allergies, current medications, past family history, past medical history, past social history, past surgical history and problem list.    Past Medical History:   Diagnosis Date   • Atrial fibrillation (CMS/HCC)    • COPD (chronic obstructive pulmonary disease) (CMS/HCC)    • Hypercholesteremia    • Hypertension    • Smoker        Family History   Problem Relation Age of Onset   • No Known Problems Mother    • No Known Problems Father        Social History     Socioeconomic History   • Marital status:      Spouse name: Not on file   • Number of children: Not on file   • Years of education: Not on file   • Highest education level: Not on file   Tobacco Use   • Smoking status: Former Smoker     Packs/day: 0.50     Years: 61.00     Pack years: 30.50     Last attempt to quit: 2017     Years since quitting: 3.3   • Smokeless tobacco: Former User   Substance and Sexual Activity   • Alcohol  use: No   • Drug use: No       Review of Systems   Constitutional: Negative for appetite change, chills, fatigue and fever.   HENT: Negative for swollen glands, trouble swallowing and voice change.    Eyes: Negative for visual disturbance.   Respiratory: Negative for cough, shortness of breath and wheezing.    Cardiovascular: Negative for chest pain and leg swelling.   Gastrointestinal: Negative for abdominal distention, abdominal pain, nausea and vomiting.   Genitourinary: Negative.    Musculoskeletal: Negative for gait problem and myalgias.   Skin: Negative.    Neurological: Negative for weakness.   Hematological: Negative.    Psychiatric/Behavioral: The patient is not nervous/anxious.        There were no vitals taken for this visit.    Physical Exam   Constitutional: No distress.   Neurological: She is alert.   Voice clear   Psychiatric: Her behavior is normal.         Merari was seen today for stage 3 severe copd.    Diagnoses and all orders for this visit:    Stage 3 severe COPD by GOLD classification (CMS/HCC)    Pulmonary emphysema, unspecified emphysema type (CMS/HCC)    Scarring of lung    Nocturnal hypoxemia    Former smoker      Patient's There is no height or weight on file to calculate BMI. BMI Cannot be assessed during this visit.      Follow-up/Plan: Continue Spiriva and oxygen at night.  At some point, the patient may want to get another overnight pulse ox because she has questioned the need for oxygen but nonetheless does wear it.  She will plan on a follow-up visit in the clinic in approximately 10 months with flow volume loop.    Jostin Mcqueen, SAMMY  5/19/2020  09:00

## 2020-05-19 ENCOUNTER — OFFICE VISIT (OUTPATIENT)
Dept: PULMONOLOGY | Facility: CLINIC | Age: 80
End: 2020-05-19

## 2020-05-19 DIAGNOSIS — J98.4 SCARRING OF LUNG: ICD-10-CM

## 2020-05-19 DIAGNOSIS — J43.9 PULMONARY EMPHYSEMA, UNSPECIFIED EMPHYSEMA TYPE (HCC): ICD-10-CM

## 2020-05-19 DIAGNOSIS — J44.9 STAGE 3 SEVERE COPD BY GOLD CLASSIFICATION (HCC): Primary | ICD-10-CM

## 2020-05-19 DIAGNOSIS — G47.34 NOCTURNAL HYPOXEMIA: ICD-10-CM

## 2020-05-19 DIAGNOSIS — Z87.891 FORMER SMOKER: ICD-10-CM

## 2020-05-19 PROCEDURE — 99441 PR PHYS/QHP TELEPHONE EVALUATION 5-10 MIN: CPT | Performed by: NURSE PRACTITIONER

## 2020-05-19 NOTE — PATIENT INSTRUCTIONS
I would recommend you get an updated pneumonia vaccine this fall.  You should ask for Pneumovax 23.      Pneumococcal Vaccine, Polyvalent solution for injection  What is this medicine?  PNEUMOCOCCAL VACCINE, POLYVALENT (AC malgorzata Robison) is a vaccine to prevent pneumococcus bacteria infection. These bacteria are a major cause of ear infections, Strep throat infections, and serious pneumonia, meningitis, or blood infections worldwide. These vaccines help the body to produce antibodies (protective substances) that help your body defend against these bacteria. This vaccine is recommended for people 2 years of age and older with health problems. It is also recommended for all adults over 50 years old. This vaccine will not treat an infection.  This medicine may be used for other purposes; ask your health care provider or pharmacist if you have questions.  COMMON BRAND NAME(S): Pneumovax 23  What should I tell my health care provider before I take this medicine?  They need to know if you have any of these conditions:  -bleeding problems  -bone marrow or organ transplant  -cancer, Hodgkin's disease  -fever  -infection  -immune system problems  -low platelet count in the blood  -seizures  -an unusual or allergic reaction to pneumococcal vaccine, diphtheria toxoid, other vaccines, latex, other medicines, foods, dyes, or preservatives  -pregnant or trying to get pregnant  -breast-feeding  How should I use this medicine?  This vaccine is for injection into a muscle or under the skin. It is given by a health care professional.  A copy of Vaccine Information Statements will be given before each vaccination. Read this sheet carefully each time. The sheet may change frequently.  Talk to your pediatrician regarding the use of this medicine in children. While this drug may be prescribed for children as young as 2 years of age for selected conditions, precautions do apply.  Overdosage: If you think you have  taken too much of this medicine contact a poison control center or emergency room at once.  NOTE: This medicine is only for you. Do not share this medicine with others.  What if I miss a dose?  It is important not to miss your dose. Call your doctor or health care professional if you are unable to keep an appointment.  What may interact with this medicine?  -medicines for cancer chemotherapy  -medicines that suppress your immune function  -medicines that treat or prevent blood clots like warfarin, enoxaparin, and dalteparin  -steroid medicines like prednisone or cortisone  This list may not describe all possible interactions. Give your health care provider a list of all the medicines, herbs, non-prescription drugs, or dietary supplements you use. Also tell them if you smoke, drink alcohol, or use illegal drugs. Some items may interact with your medicine.  What should I watch for while using this medicine?  Mild fever and pain should go away in 3 days or less. Report any unusual symptoms to your doctor or health care professional.  What side effects may I notice from receiving this medicine?  Side effects that you should report to your doctor or health care professional as soon as possible:  -allergic reactions like skin rash, itching or hives, swelling of the face, lips, or tongue  -breathing problems  -confused  -fever over 102 degrees F  -pain, tingling, numbness in the hands or feet  -seizures  -unusual bleeding or bruising  -unusual muscle weakness  Side effects that usually do not require medical attention (report to your doctor or health care professional if they continue or are bothersome):  -aches and pains  -diarrhea  -fever of 102 degrees F or less  -headache  -irritable  -loss of appetite  -pain, tender at site where injected  -trouble sleeping  This list may not describe all possible side effects. Call your doctor for medical advice about side effects. You may report side effects to FDA at  1-800-FDA-1088.  Where should I keep my medicine?  This does not apply. This vaccine is given in a clinic, pharmacy, doctor's office, or other health care setting and will not be stored at home.  NOTE: This sheet is a summary. It may not cover all possible information. If you have questions about this medicine, talk to your doctor, pharmacist, or health care provider.  © 2020 [employer] Standard (2009-07-24 14:32:37)

## 2020-05-26 ENCOUNTER — OFFICE VISIT (OUTPATIENT)
Dept: CARDIOLOGY | Facility: CLINIC | Age: 80
End: 2020-05-26

## 2020-05-26 VITALS
SYSTOLIC BLOOD PRESSURE: 110 MMHG | WEIGHT: 145 LBS | BODY MASS INDEX: 25.69 KG/M2 | HEART RATE: 98 BPM | OXYGEN SATURATION: 94 % | DIASTOLIC BLOOD PRESSURE: 74 MMHG | HEIGHT: 63 IN

## 2020-05-26 DIAGNOSIS — I10 ESSENTIAL HYPERTENSION: Chronic | ICD-10-CM

## 2020-05-26 DIAGNOSIS — I51.9 RIGHT VENTRICULAR DYSFUNCTION: ICD-10-CM

## 2020-05-26 DIAGNOSIS — J43.1 PANLOBULAR EMPHYSEMA (HCC): Chronic | ICD-10-CM

## 2020-05-26 DIAGNOSIS — I48.21 PERMANENT ATRIAL FIBRILLATION (HCC): Primary | ICD-10-CM

## 2020-05-26 PROCEDURE — 99214 OFFICE O/P EST MOD 30 MIN: CPT | Performed by: INTERNAL MEDICINE

## 2020-05-26 PROCEDURE — 93000 ELECTROCARDIOGRAM COMPLETE: CPT | Performed by: INTERNAL MEDICINE

## 2020-05-26 RX ORDER — DILTIAZEM HYDROCHLORIDE 180 MG/1
180 CAPSULE, COATED, EXTENDED RELEASE ORAL DAILY
Qty: 180 CAPSULE | Refills: 3 | Status: SHIPPED | OUTPATIENT
Start: 2020-05-26 | End: 2021-06-04 | Stop reason: SDUPTHER

## 2020-05-26 NOTE — PROGRESS NOTES
Subjective:     Encounter Date: 05/26/20      Patient ID: Merari Gaspar is a 80 y.o. female.    Chief Complaint: f/u afib  Mrs. Gaspar is a pleaseant 80 year I have followed since a May '18 admission for new onset AF with RVR. Was found on echo to have dilation of RV, and underwent CHRISTINE-guided DCCV back to NSR. However, she quickly reverted to AF within an hour. She was discharged on eliquis and cardizem 360mg for rate control.    At our visit on 6/21/18, her main complaint was leg swelling. I explained that this is likely from right ventricular dysfunction d/t underlying lung disease, and that the best treatment was elevation, compressions stockings, and lasix only as needed.  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      Our last routine visit was in March 2019, at which point she was doing well no changes in therapy were made.  She returns today for routine follow-up.    Since last visit, she was started on lasix daily.  Her leg swelling has resolved on this.  With regards to atrial fibrillation, she continues to deny any palpitations, orthopnea, dizziness, lightheadedness, or chest pain.    She is seeking to reduce her medication burden if at all possible.    The following portions of the patient's history were reviewed and updated as appropriate: allergies, current medications, past family history, past medical history, past social history, past surgical history and problem list.    Review of Systems   Constitution: Negative for malaise/fatigue.   Cardiovascular: Negative for chest pain, claudication, dyspnea on exertion, leg swelling, near-syncope, orthopnea, palpitations, paroxysmal nocturnal dyspnea and syncope.   Respiratory: Negative for shortness of breath.    Hematologic/Lymphatic: Does not bruise/bleed easily.     Current Outpatient Medications:   •  apixaban (ELIQUIS) 5 MG tablet tablet, Take 1  tablet by mouth Every 12 (Twelve) Hours., Disp: 60 tablet, Rfl: 2  •  coenzyme Q10 100 MG capsule, Take 100 mg by mouth Every Night., Disp: , Rfl:   •  dilTIAZem CD (Cartia XT) 180 MG 24 hr capsule, Take 1 capsule by mouth Daily., Disp: 180 capsule, Rfl: 3  •  furosemide (LASIX) 20 MG tablet, Take 20 mg by mouth Daily As Needed., Disp: , Rfl: 0  •  lovastatin (MEVACOR) 20 MG tablet, Take 20 mg by mouth Every Other Day. Alternate half-tablet (10mg) dose every other day., Disp: , Rfl:   •  Multiple Vitamins-Minerals (MULTIVITAL PO), Take 1 tablet by mouth Daily., Disp: , Rfl:   •  O2 (OXYGEN), Inhale 2 L 1 (One) Time., Disp: , Rfl:   •  tiotropium bromide monohydrate (SPIRIVA RESPIMAT) 2.5 MCG/ACT aerosol solution inhaler, Inhale 2 puffs Daily., Disp: 1 inhaler, Rfl: 6       Objective:      Vitals:    05/26/20 0800   BP: 110/74   Pulse: 98   SpO2: 94%     Physical Exam   Constitutional: She is oriented to person, place, and time. She appears well-developed and well-nourished.   Neck: No JVD present.   Cardiovascular: Normal rate, normal heart sounds and intact distal pulses. An irregularly irregular rhythm present.   No murmur heard.  Pulmonary/Chest: Effort normal and breath sounds normal.   Musculoskeletal: She exhibits no edema.   Neurological: She is alert and oriented to person, place, and time.   Skin: Skin is warm and dry.       Lab Review:         ECG 12 Lead  Date/Time: 5/26/2020 5:15 PM  Performed by: Sohail Strong MD  Authorized by: Sohail Strong MD   Comparison: compared with previous ECG from 3/19/2019  Similar to previous ECG  Rhythm: atrial fibrillation  BPM: 98  Conduction: incomplete right bundle branch block  Q waves: V2, V3 and V1      Clinical impression: abnormal EKG              Assessment/Plan:     Problem List Items Addressed This Visit        Cardiovascular and Mediastinum    Essential hypertension (Chronic)    Relevant Medications    dilTIAZem CD (Cartia XT) 180 MG 24 hr capsule     A-fib (CMS/HCC) - Primary    Overview     CHADS-VASc Risk Assessment            4       Total Score        1 Hypertension    2 Age >/= 75    1 Sex: Female                 Relevant Medications    dilTIAZem CD (Cartia XT) 180 MG 24 hr capsule    Right ventricular dysfunction    Overview     Likely due to underlying lung disease.           Relevant Medications    dilTIAZem CD (Cartia XT) 180 MG 24 hr capsule       Respiratory    Panlobular emphysema (CMS/HCC) (Chronic)                 Recommendations/plans  -Continue eliquis 5mg po bid - unless renal function changes (to Cr >1.5) then this would be the appropriate dose.  Patient states that she will see Dr. Cage soon and will likely be having blood work; per package insert/FDA-approved dosing regimens on Eliquis, at her current age and weight, if her creatinine happens to be greater than 1.5, then her appropriate dose would be 2.5 mg twice daily; otherwise, continue as is  -reduce cartia xt to 180mg po daily to see if this helps general side effects improve; cautioned her regarding possible symptoms that would signal rapid ventricular response and encouraged her to call or seek emergency treatment if she develops any      F/u 12 months    Sohail Strong MD  05/26/20  17:15

## 2020-08-27 ENCOUNTER — TELEPHONE (OUTPATIENT)
Dept: PULMONOLOGY | Facility: CLINIC | Age: 80
End: 2020-08-27

## 2020-08-27 NOTE — TELEPHONE ENCOUNTER
Pt called about the Pneumovax 23 immunization.  She went to [facility] and got the flu vaccine, but they would not give her the Pneumovax 23.  They said would have to be done in our office.  We do have some here.      Jostin, how would you like to proceed?

## 2020-08-30 NOTE — TELEPHONE ENCOUNTER
She can either come to the office for a nurse visit and get the P-23 or it would also be okay for her to wait until her return visit with me in March. Her last P-23 was 2015 so she still has some time.

## 2020-09-04 ENCOUNTER — CLINICAL SUPPORT (OUTPATIENT)
Dept: PULMONOLOGY | Facility: CLINIC | Age: 80
End: 2020-09-04

## 2020-09-04 DIAGNOSIS — Z23 NEED FOR VACCINATION: Primary | ICD-10-CM

## 2020-09-04 PROCEDURE — G0009 ADMIN PNEUMOCOCCAL VACCINE: HCPCS | Performed by: NURSE PRACTITIONER

## 2020-09-04 PROCEDURE — 90732 PPSV23 VACC 2 YRS+ SUBQ/IM: CPT | Performed by: NURSE PRACTITIONER

## 2021-03-19 ENCOUNTER — OFFICE VISIT (OUTPATIENT)
Dept: PULMONOLOGY | Facility: CLINIC | Age: 81
End: 2021-03-19

## 2021-03-19 VITALS
DIASTOLIC BLOOD PRESSURE: 80 MMHG | OXYGEN SATURATION: 97 % | BODY MASS INDEX: 27.68 KG/M2 | WEIGHT: 156.2 LBS | TEMPERATURE: 96.4 F | HEIGHT: 63 IN | HEART RATE: 95 BPM | SYSTOLIC BLOOD PRESSURE: 148 MMHG

## 2021-03-19 DIAGNOSIS — J43.1 PANLOBULAR EMPHYSEMA (HCC): Chronic | ICD-10-CM

## 2021-03-19 DIAGNOSIS — G47.34 NOCTURNAL HYPOXEMIA: ICD-10-CM

## 2021-03-19 DIAGNOSIS — J44.9 STAGE 3 SEVERE COPD BY GOLD CLASSIFICATION (HCC): Primary | Chronic | ICD-10-CM

## 2021-03-19 DIAGNOSIS — J98.4 SCARRING OF LUNG: ICD-10-CM

## 2021-03-19 DIAGNOSIS — Z87.891 FORMER SMOKER: ICD-10-CM

## 2021-03-19 PROCEDURE — 99214 OFFICE O/P EST MOD 30 MIN: CPT | Performed by: NURSE PRACTITIONER

## 2021-03-19 NOTE — PROGRESS NOTES
"Chief Complaint  stage 3 severe copd    Subjective    History of Present Illness      Merari Gaspar presents to [facility] for:    Management of severe COPD, emphysema, lung scarring and nocturnal hypoxia.  She is a former smoker who quit in 2017.  She is not having any pulmonary issues currently.  She continues on Spiriva and uses oxygen at night.  She continues to stay active with exercise.  She stays up-to-date on vaccines and has already received her Covid vaccines.       Objective   Vital Signs:   /80   Pulse 95   Temp 96.4 °F (35.8 °C)   Ht 160 cm (63\")   Wt 70.9 kg (156 lb 3.2 oz)   SpO2 97% Comment: ra  BMI 27.67 kg/m²     Physical Exam  Vitals reviewed.   Constitutional:       General: She is not in acute distress.     Appearance: Normal appearance.   HENT:      Head: Normocephalic and atraumatic.      Comments: Wearing a mask  Cardiovascular:      Rate and Rhythm: Normal rate and regular rhythm.   Pulmonary:      Breath sounds: Decreased breath sounds (At bases only, otherwise clear) present.   Musculoskeletal:      Cervical back: Normal range of motion.   Skin:     General: Skin is warm and dry.   Neurological:      Mental Status: She is alert and oriented to person, place, and time.   Psychiatric:         Mood and Affect: Mood normal.         Behavior: Behavior normal.        Result Review :       PFT Values        Some values may be hidden. Unless noted otherwise, only the newest values recorded on each date are displayed.         Old Values PFT Results 11/19/19   No data to display.      Pre Drug PFT Results 11/19/19   FVC 53   FEV1 44   FEF 25-75% 24   FEV1/FVC 62.9      Post Drug PFT Results 11/19/19   No data to display.      Other Tests PFT Results 11/19/19   No data to display.               Results for orders placed in visit on 11/19/19    Pulmonary Function Test    Narrative  Pulmonary Function Test  Performed by: Charisse" SAMMY Frankel  Authorized by: Jostin Mcqueen APRN    Pre Drug  FVC: 53%  FEV1: 44%  FEF 25-75%: 24%  FEV1/FVC: 62.9%      Results for orders placed in visit on 11/26/18    Pulmonary Function Test             Assessment and Plan      Diagnoses and all orders for this visit:    1. Stage 3 severe COPD by GOLD classification (CMS/formerly [facility]) (Primary)  Comments:  Stable.  Continue Spiriva.  Prescription sent to pharmacy.  Orders:  -     tiotropium bromide monohydrate (SPIRIVA RESPIMAT) 2.5 MCG/ACT aerosol solution inhaler; Inhale 2 puffs Daily.  Dispense: 1 each; Refill: 11  -     XR Chest 2 View; Future    2. Panlobular emphysema (CMS/formerly [facility])  Comments:  Stable.  Continue current treatment.  Orders:  -     XR Chest 2 View; Future    3. Scarring of lung  Comments:  Chest x-ray prior to next follow-up.  Orders:  -     XR Chest 2 View; Future    4. Nocturnal hypoxemia  Comments:  Continue oxygen at night.    5. Former smoker  Comments:  Chest x-ray ordered for next follow-up.  Orders:  -     XR Chest 2 View; Future        Patient's Body mass index is 27.67 kg/m². BMI is above normal parameters. Recommendations include: continue to excercise .      SAMMY Hodges  3/19/2021  09:49 CDT    Follow Up   Return in about 1 year (around 3/19/2022) for FVL, CXR.    Patient was given instructions and counseling regarding her condition or for health maintenance advice. Please see specific information pulled into the AVS if appropriate.

## 2021-03-25 DIAGNOSIS — J44.9 STAGE 3 SEVERE COPD BY GOLD CLASSIFICATION (HCC): Chronic | ICD-10-CM

## 2021-06-04 ENCOUNTER — OFFICE VISIT (OUTPATIENT)
Dept: CARDIOLOGY | Facility: CLINIC | Age: 81
End: 2021-06-04

## 2021-06-04 VITALS
HEIGHT: 63 IN | SYSTOLIC BLOOD PRESSURE: 142 MMHG | DIASTOLIC BLOOD PRESSURE: 70 MMHG | HEART RATE: 120 BPM | WEIGHT: 152 LBS | OXYGEN SATURATION: 96 % | BODY MASS INDEX: 26.93 KG/M2

## 2021-06-04 DIAGNOSIS — I48.91 ATRIAL FIBRILLATION WITH RVR (HCC): ICD-10-CM

## 2021-06-04 DIAGNOSIS — I51.9 RIGHT VENTRICULAR DYSFUNCTION: ICD-10-CM

## 2021-06-04 DIAGNOSIS — E78.00 PURE HYPERCHOLESTEROLEMIA: Chronic | ICD-10-CM

## 2021-06-04 DIAGNOSIS — I10 ESSENTIAL HYPERTENSION: Chronic | ICD-10-CM

## 2021-06-04 DIAGNOSIS — I48.21 PERMANENT ATRIAL FIBRILLATION (HCC): Primary | ICD-10-CM

## 2021-06-04 PROCEDURE — 99214 OFFICE O/P EST MOD 30 MIN: CPT | Performed by: INTERNAL MEDICINE

## 2021-06-04 PROCEDURE — 93000 ELECTROCARDIOGRAM COMPLETE: CPT | Performed by: INTERNAL MEDICINE

## 2021-06-04 RX ORDER — DILTIAZEM HYDROCHLORIDE 240 MG/1
240 CAPSULE, COATED, EXTENDED RELEASE ORAL DAILY
Qty: 30 CAPSULE | Refills: 11 | Status: SHIPPED | OUTPATIENT
Start: 2021-06-04 | End: 2022-05-06

## 2021-06-04 NOTE — PROGRESS NOTES
Subjective:     Encounter Date: 06/04/21      Patient ID: Merari Gaspar is a 81 y.o. female.    Chief Complaint: f/u afib  History of Present Illness   Mrs. Gaspar is a pleaseant 81 year I have followed since a May '18 admission for new onset AF with RVR. Was found on echo to have dilation of RV, and underwent CHRISTINE-guided DCCV back to NSR. However, she quickly reverted to AF within an hour. She was discharged on eliquis and cardizem 360mg for rate control.    At our visit on 6/21/18, her main complaint was leg swelling. I explained that this is likely from right ventricular dysfunction d/t underlying lung disease, and that the best treatment was elevation, compressions stockings, and lasix only as needed.  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      At our last visit in May 2020, she reported that she had been started on lasix daily.  Her leg swelling has resolved on this.  With regards to atrial fibrillation, she continued to deny any palpitations, orthopnea, dizziness, lightheadedness, or chest pain.  She was seeking to reduce her medication burden if at all possible, so I did advise that she could cut back her Cardizem dose to 180 mg daily but counseled her to call us back if she notes worsening symptoms such as palpitations indicative of a rapid ventricular response.  We have not heard from her since, she returns now today for routine follow-up.    Overall, she states she is doing very well.  States her energy is good, she does not notice any palpitations, shortness of breath, or chest pain.  Tolerating all medications.        The following portions of the patient's history were reviewed and updated as appropriate: allergies, current medications, past family history, past medical history, past social history, past surgical history and problem list.    Review of Systems   Constitutional: Negative for  malaise/fatigue.   Cardiovascular: Negative for chest pain, claudication, dyspnea on exertion, leg swelling, near-syncope, orthopnea, palpitations, paroxysmal nocturnal dyspnea and syncope.   Respiratory: Negative for shortness of breath.    Hematologic/Lymphatic: Does not bruise/bleed easily.     Current Outpatient Medications:   •  apixaban (ELIQUIS) 5 MG tablet tablet, Take 1 tablet by mouth Every 12 (Twelve) Hours., Disp: 60 tablet, Rfl: 2  •  coenzyme Q10 100 MG capsule, Take 100 mg by mouth Every Night., Disp: , Rfl:   •  dilTIAZem CD (Cartia XT) 240 MG 24 hr capsule, Take 1 capsule by mouth Daily., Disp: 30 capsule, Rfl: 11  •  furosemide (LASIX) 20 MG tablet, Take 20 mg by mouth Daily As Needed., Disp: , Rfl: 0  •  lovastatin (MEVACOR) 20 MG tablet, Take 20 mg by mouth Every Other Day. Alternate half-tablet (10mg) dose every other day., Disp: , Rfl:   •  Multiple Vitamins-Minerals (MULTIVITAL PO), Take 1 tablet by mouth Daily., Disp: , Rfl:   •  O2 (OXYGEN), Inhale 2 L 1 (One) Time., Disp: , Rfl:   •  tiotropium bromide monohydrate (SPIRIVA RESPIMAT) 2.5 MCG/ACT aerosol solution inhaler, Inhale 2 puffs Daily., Disp: 2 each, Rfl: 0       Objective:      Vitals:    06/04/21 1007   BP: 142/70   Pulse: 120   SpO2: 96%     Physical Exam  Constitutional:       Appearance: She is well-developed.   Neck:      Vascular: No JVD.   Cardiovascular:      Rate and Rhythm: Tachycardia present. Rhythm irregularly irregular.      Pulses: Intact distal pulses.      Heart sounds: Normal heart sounds. No murmur heard.     Pulmonary:      Effort: Pulmonary effort is normal.      Breath sounds: Normal breath sounds.   Skin:     General: Skin is warm and dry.   Neurological:      Mental Status: She is alert and oriented to person, place, and time.         Lab Review:         ECG 12 Lead    Date/Time: 6/4/2021 10:30 AM  Performed by: Sohail Strong MD  Authorized by: Sohail Strong MD   Comparison: compared with previous ECG  from 5/26/2020  Similar to previous ECG  Rhythm: atrial fibrillation  Rate: tachycardic  BPM: 120  QRS axis: normal    Clinical impression: abnormal EKG              Assessment/Plan:     Problem List Items Addressed This Visit (all established, stable except where otherwise noted)       Cardiac and Vasculature    Essential hypertension (Chronic)    Relevant Medications    dilTIAZem CD (Cartia XT) 240 MG 24 hr capsule    Pure hypercholesterolemia (Chronic)    A-fib (CMS/HCC) - Primary    Overview     CHADS-VASc Risk Assessment            4       Total Score        1 Hypertension    2 Age >/= 75    1 Sex: Female                 Relevant Medications    dilTIAZem CD (Cartia XT) 240 MG 24 hr capsule    Right ventricular dysfunction    Overview     Likely due to underlying lung disease.           Relevant Medications    dilTIAZem CD (Cartia XT) 240 MG 24 hr capsule      Other Visit Diagnoses     Atrial fibrillation with RVR (CMS/HCC): Rates elevated today.  Patient stable and otherwise symptomatically unaware.        Relevant Medications    dilTIAZem CD (Cartia XT) 240 MG 24 hr capsule                 Recommendations/plans  -Continue eliquis 5mg po bid for CVA prophylaxis  -increase cartia xt to 240mg po daily for better rate control  -Continue Lasix only as needed    F/u 12 months, or sooner with any new or worsening symptoms    Sohail Strong MD  06/04/21  10:38 CDT

## 2022-03-15 ENCOUNTER — LAB (OUTPATIENT)
Dept: LAB | Facility: HOSPITAL | Age: 82
End: 2022-03-15

## 2022-03-15 DIAGNOSIS — Z01.812 ENCOUNTER FOR PREOPERATIVE SCREENING LABORATORY TESTING FOR COVID-19 VIRUS: ICD-10-CM

## 2022-03-15 DIAGNOSIS — Z20.822 ENCOUNTER FOR PREOPERATIVE SCREENING LABORATORY TESTING FOR COVID-19 VIRUS: ICD-10-CM

## 2022-03-15 LAB — SARS-COV-2 ORF1AB RESP QL NAA+PROBE: NOT DETECTED

## 2022-03-15 PROCEDURE — U0004 COV-19 TEST NON-CDC HGH THRU: HCPCS

## 2022-03-15 PROCEDURE — U0005 INFEC AGEN DETEC AMPLI PROBE: HCPCS

## 2022-03-15 PROCEDURE — C9803 HOPD COVID-19 SPEC COLLECT: HCPCS

## 2022-03-16 ENCOUNTER — TELEPHONE (OUTPATIENT)
Dept: PULMONOLOGY | Facility: CLINIC | Age: 82
End: 2022-03-16

## 2022-03-16 DIAGNOSIS — J43.1 PANLOBULAR EMPHYSEMA: Chronic | ICD-10-CM

## 2022-03-16 DIAGNOSIS — Z87.891 FORMER SMOKER: ICD-10-CM

## 2022-03-16 DIAGNOSIS — J44.9 STAGE 3 SEVERE COPD BY GOLD CLASSIFICATION: Chronic | ICD-10-CM

## 2022-03-16 DIAGNOSIS — J98.4 SCARRING OF LUNG: ICD-10-CM

## 2022-03-16 NOTE — TELEPHONE ENCOUNTER
She is scheduled to see me for her annual appointment this Friday, please call her and remind her to get a chest x-ray at [facility] prior.  Thank you.

## 2022-03-18 ENCOUNTER — OFFICE VISIT (OUTPATIENT)
Dept: PULMONOLOGY | Facility: CLINIC | Age: 82
End: 2022-03-18

## 2022-03-18 VITALS
HEIGHT: 63 IN | DIASTOLIC BLOOD PRESSURE: 80 MMHG | SYSTOLIC BLOOD PRESSURE: 126 MMHG | BODY MASS INDEX: 25.87 KG/M2 | OXYGEN SATURATION: 92 % | HEART RATE: 114 BPM | WEIGHT: 146 LBS

## 2022-03-18 DIAGNOSIS — Z87.891 FORMER SMOKER: ICD-10-CM

## 2022-03-18 DIAGNOSIS — J44.9 STAGE 3 SEVERE COPD BY GOLD CLASSIFICATION: Primary | Chronic | ICD-10-CM

## 2022-03-18 DIAGNOSIS — J98.4 SCARRING OF LUNG: Chronic | ICD-10-CM

## 2022-03-18 DIAGNOSIS — Z01.812 ENCOUNTER FOR PREOPERATIVE SCREENING LABORATORY TESTING FOR COVID-19 VIRUS: ICD-10-CM

## 2022-03-18 DIAGNOSIS — Z20.822 ENCOUNTER FOR PREOPERATIVE SCREENING LABORATORY TESTING FOR COVID-19 VIRUS: ICD-10-CM

## 2022-03-18 PROCEDURE — 99214 OFFICE O/P EST MOD 30 MIN: CPT | Performed by: NURSE PRACTITIONER

## 2022-03-18 PROCEDURE — 94010 BREATHING CAPACITY TEST: CPT | Performed by: NURSE PRACTITIONER

## 2022-03-18 RX ORDER — PREDNISONE 10 MG/1
TABLET ORAL
Qty: 30 TABLET | Refills: 2 | Status: SHIPPED | OUTPATIENT
Start: 2022-03-18 | End: 2022-06-10

## 2022-03-18 NOTE — PROGRESS NOTES
"Chief Complaint  COPD    Subjective    History of Present Illness      Merari Gaspar presents to [facility] for:    History of Present Illness   Annual visit for management of severe COPD, emphysema, lung scarring and nocturnal hypoxia.  She had updated spirometry today which shows stability over the last 3 years with a current FEV1 of 42% predicted.  She has had a good year with no major pulmonary problems.  She continues to utilize and benefit from nighttime oxygen.  She uses Spiriva but admits \"not every day\".  She occasionally has some pleuritic type pain so I will send her in a prescription for as needed prednisone.  She is up-to-date on vaccines.  She had her annual chest x-ray at [facility] on 3/16/2022 that showed hyperinflation with some mild bibasilar fibrosis but nothing acute.  Objective   Vital Signs:   /80   Pulse 114   Ht 160 cm (63\")   Wt 66.2 kg (146 lb)   SpO2 92% Comment: RA  BMI 25.86 kg/m²     Physical Exam  Vitals reviewed.   Constitutional:       General: She is not in acute distress.     Appearance: Normal appearance.   HENT:      Head: Normocephalic and atraumatic.      Comments: Wearing a mask  Cardiovascular:      Rate and Rhythm: Normal rate and regular rhythm.   Pulmonary:      Breath sounds: Decreased breath sounds (At bases only, otherwise clear) present.   Musculoskeletal:      Cervical back: Normal range of motion.   Skin:     General: Skin is warm and dry.   Neurological:      Mental Status: She is alert and oriented to person, place, and time.   Psychiatric:         Mood and Affect: Mood normal.         Behavior: Behavior normal.        Result Review :   The following data was reviewed by: SAMMY Hodges on 03/18/2022:  XR Chest 2 View (03/16/2022 00:00)    Results for orders placed in visit on 03/18/22    Pulmonary Function Test    Narrative  Pulmonary Function Test  Performed by: Preston" Daksha HOLGUIN, RRT  Authorized by: Jostin Mcqueen APRN    Pre Drug % Predicted  FVC: 50%  FEV1: 42%  FEF 25-75%: 25%  FEV1/FVC: 63%    Interpretation  Spirometry  Spirometry shows severe obstruction. There is reduced midflow suggesting small airway/airflow obstruction.  Overall comments: Severe but stable obstruction.      Results for orders placed in visit on 11/19/19    Pulmonary Function Test    Narrative  Pulmonary Function Test  Performed by: Jostin Mcqueen APRN  Authorized by: Jostin Mcqueen APRN    Pre Drug  FVC: 53%  FEV1: 44%  FEF 25-75%: 24%  FEV1/FVC: 62.9%      Results for orders placed in visit on 11/26/18    Pulmonary Function Test               Assessment and Plan      Diagnoses and all orders for this visit:    1. Stage 3 severe COPD by GOLD classification (HCC) (Primary)  Comments:  Stable.  Continue Spiriva.  Prednisone as needed.  Flow-volume loop on return.  Orders:  -     Pulmonary Function Test  -     predniSONE (DELTASONE) 10 MG tablet; Take as needed for pleuritic pain.30  Dispense: 30 tablet; Refill: 2    2. Encounter for preoperative screening laboratory testing for COVID-19 virus  Comments:  This was required prior to pulmonary function testing.  Orders:  -     COVID PRE-OP / PRE-PROCEDURE SCREENING ORDER (NO ISOLATION) - Swab, Nasal Cavity; Future    3. Former smoker  Comments:  Quit 5 years ago.  No longer qualifies for annual lung cancer screenings.  Continue annual chest x-rays.  Orders:  -     XR Chest 2 View; Future    4. Scarring of lung  Comments:  Stable.  Continue annual chest x-ray surveillance.  Orders:  -     XR Chest 2 View; Future        SAMMY Hodges  3/18/2022  09:57 CDT    Follow Up   Return in about 1 year (around 3/18/2023) for FVL, CXR on return.    Patient was given instructions and counseling regarding her condition or for health maintenance advice. Please see specific information pulled into the AVS if appropriate.

## 2022-03-18 NOTE — PROCEDURES
Pulmonary Function Test  Performed by: Daksha Johnston, RRT  Authorized by: Jostin Mcqueen APRN      Pre Drug % Predicted    FVC: 50%   FEV1: 42%   FEF 25-75%: 25%   FEV1/FVC: 63%    Interpretation   Spirometry   Spirometry shows severe obstruction. There is reduced midflow suggesting small airway/airflow obstruction.   Overall comments: Severe but stable obstruction.

## 2022-05-07 RX ORDER — DILTIAZEM HYDROCHLORIDE 240 MG/1
CAPSULE, COATED, EXTENDED RELEASE ORAL
Qty: 90 CAPSULE | Refills: 3 | Status: SHIPPED | OUTPATIENT
Start: 2022-05-07

## 2022-06-10 ENCOUNTER — OFFICE VISIT (OUTPATIENT)
Dept: CARDIOLOGY | Facility: CLINIC | Age: 82
End: 2022-06-10

## 2022-06-10 VITALS
WEIGHT: 143.6 LBS | SYSTOLIC BLOOD PRESSURE: 132 MMHG | HEART RATE: 117 BPM | BODY MASS INDEX: 25.45 KG/M2 | OXYGEN SATURATION: 90 % | DIASTOLIC BLOOD PRESSURE: 88 MMHG | HEIGHT: 63 IN

## 2022-06-10 DIAGNOSIS — I48.21 PERMANENT ATRIAL FIBRILLATION: Primary | ICD-10-CM

## 2022-06-10 DIAGNOSIS — I51.9 RIGHT VENTRICULAR DYSFUNCTION: ICD-10-CM

## 2022-06-10 DIAGNOSIS — E78.00 PURE HYPERCHOLESTEROLEMIA: Chronic | ICD-10-CM

## 2022-06-10 DIAGNOSIS — I10 ESSENTIAL HYPERTENSION: Chronic | ICD-10-CM

## 2022-06-10 PROCEDURE — 93000 ELECTROCARDIOGRAM COMPLETE: CPT | Performed by: NURSE PRACTITIONER

## 2022-06-10 PROCEDURE — 99214 OFFICE O/P EST MOD 30 MIN: CPT | Performed by: NURSE PRACTITIONER

## 2022-06-10 NOTE — PROGRESS NOTES
"     Subjective:     Encounter Date: 06/10/22      Patient ID: Merari Gaspar is a 82 y.o. female.    Chief Complaint: f/u afib    History of Present Illness     Mrs. Gaspar is a pleaseant 82 year old Dr. Strong has followed since a May '18 admission for new onset AF with RVR. Was found on echo to have dilation of RV, and underwent CHRISTINE-guided DCCV back to NSR. However, she quickly reverted to AF within an hour. She was discharged on eliquis and cardizem 360mg for rate control.    At her visit on 6/21/18, her main complaint was leg swelling. Dr. Strong explained that this was likely from right ventricular dysfunction d/t underlying lung disease, and that the best treatment was elevation, compressions stockings, and lasix only as needed.  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      At her visit in May 2020, she reported that she had been started on lasix daily.  Her leg swelling has resolved on this.  With regards to atrial fibrillation, she continued to deny any palpitations, orthopnea, dizziness, lightheadedness, or chest pain.  She was seeking to reduce her medication burden if at all possible, so Dr. Strong did advise that she could cut back her Cardizem dose to 180 mg daily but counseled her to call us back if she noted worsening symptoms such as palpitations indicative of a rapid ventricular response.      She came to see Dr. Strong in June 2021 and was doing very well.  Energy level was good and she was not noticing palpitations, shortness of breath or chest pain.  She was tolerating her medications.  Her heart rate per EKG that day was 120 bpm, so diltiazem was uptitrated to 240 mg daily for better rate control.    Today the patient states she is feeling very well overall.  She states \"I stay busy.  I like to piddle.\"  She admits chronic shortness of breath with exertion related to her COPD, but she feels this " is stable overall.  She admits some chronic fatigue.  She denies any chest pain, palpitations, edema, orthopnea, PND syncope or presyncope.  She states she checks her oxygen saturation and heart rate at home with a pulse oximeter, and her resting heart rate is typically in the 80s.  She states she has not required Lasix in several weeks to months.  She reports her blood pressure is typically well controlled.  She is unsure regarding the doses and frequencies of her medications, but she states she is compliant with these and no recent changes have been made.    The following portions of the patient's history were reviewed and updated as appropriate: allergies, current medications, past family history, past medical history, past social history, past surgical history and problem list.    Review of Systems   Constitutional: Positive for malaise/fatigue.   Cardiovascular: Positive for dyspnea on exertion. Negative for chest pain, claudication, leg swelling, near-syncope, orthopnea, palpitations, paroxysmal nocturnal dyspnea and syncope.   Respiratory: Positive for shortness of breath. Negative for cough.    Hematologic/Lymphatic: Does not bruise/bleed easily.   Musculoskeletal: Negative for falls.   Gastrointestinal: Negative for bloating.   Neurological: Negative for dizziness, light-headedness and weakness.     Current Outpatient Medications:   •  apixaban (ELIQUIS) 5 MG tablet tablet, Take 1 tablet by mouth Every 12 (Twelve) Hours., Disp: 60 tablet, Rfl: 2  •  coenzyme Q10 100 MG capsule, Take 100 mg by mouth Every Night., Disp: , Rfl:   •  dilTIAZem CD (CARDIZEM CD) 240 MG 24 hr capsule, Take 1 capsule by mouth once daily, Disp: 90 capsule, Rfl: 3  •  furosemide (LASIX) 20 MG tablet, Take 20 mg by mouth Daily As Needed., Disp: , Rfl: 0  •  lovastatin (MEVACOR) 20 MG tablet, Take 20 mg by mouth Every Other Day. Alternate half-tablet (10mg) dose every other day., Disp: , Rfl:   •  Multiple Vitamins-Minerals (MULTIVITAL  PO), Take 1 tablet by mouth Daily., Disp: , Rfl:   •  O2 (OXYGEN), Inhale 2 L 1 (One) Time., Disp: , Rfl:   •  tiotropium bromide monohydrate (SPIRIVA RESPIMAT) 2.5 MCG/ACT aerosol solution inhaler, Inhale 2 puffs Daily., Disp: 2 each, Rfl: 0       Objective:      Vitals:    06/10/22 1107   BP: 132/88   Pulse: 117   SpO2: 92%   Weight: 143 lbs    Physical Exam  Constitutional:       Appearance: She is well-developed.   Neck:      Vascular: No JVD.   Cardiovascular:      Rate and Rhythm: Tachycardia present. Rhythm irregularly irregular.      Heart sounds: Normal heart sounds. No murmur heard.  Pulmonary:      Effort: Pulmonary effort is normal.      Breath sounds: Decreased breath sounds present.   Skin:     General: Skin is warm and dry.   Neurological:      Mental Status: She is alert and oriented to person, place, and time.         Lab Review:   Lab Results   Component Value Date    GLUCOSE 100 05/22/2018    CALCIUM 9.0 05/22/2018     (L) 05/22/2018    K 4.3 05/22/2018    CO2 >40.0 (C) 05/22/2018    CL 87 (L) 05/22/2018    BUN 13 05/22/2018    CREATININE 0.59 05/22/2018    EGFRIFNONA 99 05/22/2018    BCR 22.0 05/22/2018    ANIONGAP  05/22/2018      Comment:      Unable to calculate Anion Gap.     Lab Results   Component Value Date    TSH 2.480 05/17/2018           ECG 12 Lead    Date/Time: 6/10/2022 11:08 AM  Performed by: Concha Vargas APRN  Authorized by: Concha Vargas APRN   Comparison: compared with previous ECG from 6/4/2021  Similar to previous ECG  Comparison to previous ECG: Afib, rvr - similar to previous   Rhythm: atrial fibrillation  BPM: 117              5/2018 CHRISTINE:    · Normal LVEF.  · Right ventricular cavity is dilated.  · Findings suggestive of pulmonary hypertension.  · Mild aortic valve regurgitation is present.  · Mild mitral valve regurgitation is present  · No thrombus in left atrial appendage with normal exit velocities - ok to proceed with cardioversion.  · Negative bubble  study        Assessment/Plan:     Problem List Items Addressed This Visit (all established, stable except where otherwise noted)       Cardiac and Vasculature    Essential hypertension (Chronic)-stable, well controlled overall    Relevant Medications    dilTIAZem CD (Cartia XT) 240 MG 24 hr capsule    Pure hypercholesterolemia (Chronic)-on statin, followed by PCP    A-fib (CMS/[facility]) - Primary--permanent    Overview     CHADS-VASc Risk Assessment            4       Total Score        1 Hypertension    2 Age >/= 75    1 Sex: Female                 Relevant Medications    dilTIAZem CD (Cartia XT) 240 MG 24 hr capsule    Right ventricular dysfunction    Overview     Likely due to underlying lung disease.           Relevant Medications    dilTIAZem CD (Cartia XT) 240 MG 24 hr capsule      Other Visit Diagnoses     Atrial fibrillation with RVR (CMS/[facility]): Rates mildly elevated today.  Patient stable and otherwise symptomatically unaware.        Relevant Medications    dilTIAZem CD (Cartia XT) 240 MG 24 hr capsule                 Recommendations/plans  -Continue eliquis 5mg po bid for CVA prophylaxis  -Continue diltiazem 240 mg daily for rate control.  Her heart rate today is in the 110s.  She tells me her resting heart rate at home is in the 80s.  She will monitor this and if her resting heart rate is persistently above 120 she will call back and we will increase her diltiazem to 360 mg daily for better rate control (of note previously, for insurance coverage reasons we had to prescribe 180 mg tablets (2 tablets daily))  -Continue Lasix only as needed-is not requiring as of late    F/u 12 months, or sooner with any new or worsening symptoms    Concha E. Knees, APRN  06/10/22  11:09 CDT

## 2022-09-27 ENCOUNTER — OFFICE VISIT (OUTPATIENT)
Dept: PULMONOLOGY | Facility: CLINIC | Age: 82
End: 2022-09-27

## 2022-09-27 VITALS
WEIGHT: 147 LBS | HEART RATE: 68 BPM | DIASTOLIC BLOOD PRESSURE: 70 MMHG | OXYGEN SATURATION: 93 % | SYSTOLIC BLOOD PRESSURE: 116 MMHG | BODY MASS INDEX: 26.05 KG/M2 | HEIGHT: 63 IN

## 2022-09-27 DIAGNOSIS — J43.1 PANLOBULAR EMPHYSEMA: Chronic | ICD-10-CM

## 2022-09-27 DIAGNOSIS — J44.9 STAGE 3 SEVERE COPD BY GOLD CLASSIFICATION: Chronic | ICD-10-CM

## 2022-09-27 DIAGNOSIS — G47.34 NOCTURNAL HYPOXEMIA: Primary | Chronic | ICD-10-CM

## 2022-09-27 DIAGNOSIS — Z87.891 FORMER SMOKER: ICD-10-CM

## 2022-09-27 PROCEDURE — 99214 OFFICE O/P EST MOD 30 MIN: CPT | Performed by: NURSE PRACTITIONER

## 2022-09-27 NOTE — PROGRESS NOTES
"Chief Complaint  No chief complaint on file.    Subjective    History of Present Illness      Merari Gaspar presents to [facility] for:    History of Present Illness   Appointment because she wishes to discontinue her nocturnal oxygen.  She tells me that she has not used it for the past month and she feels good off of it.  She has severe COPD, emphysema with lung scarring and nocturnal hypoxia.  She is a former smoker who quit in 2017.  She uses Spiriva daily and prednisone as needed.  She has no new or worsening pulmonary complaints.  She is up-to-date on vaccines.  Objective   Vital Signs:   /70   Pulse 68   Ht 160 cm (63\")   Wt 66.7 kg (147 lb)   SpO2 93% Comment: RA  BMI 26.04 kg/m²     Physical Exam  Vitals reviewed.   Constitutional:       General: She is not in acute distress.     Appearance: Normal appearance.   HENT:      Head: Normocephalic and atraumatic.      Comments: Wearing a mask  Cardiovascular:      Rate and Rhythm: Normal rate and regular rhythm.   Pulmonary:      Breath sounds: Decreased breath sounds (At bases only, otherwise clear) present.   Musculoskeletal:      Cervical back: Normal range of motion.   Skin:     General: Skin is warm and dry.   Neurological:      Mental Status: She is alert and oriented to person, place, and time.   Psychiatric:         Mood and Affect: Mood normal.         Behavior: Behavior normal.        Result Review :       Results for orders placed in visit on 03/18/22    Pulmonary Function Test    Narrative  Pulmonary Function Test  Performed by: Daksha Johnston, RRT  Authorized by: Jostin Mcqueen APRN    Pre Drug % Predicted  FVC: 50%  FEV1: 42%  FEF 25-75%: 25%  FEV1/FVC: 63%    Interpretation  Spirometry  Spirometry shows severe obstruction. There is reduced midflow suggesting small airway/airflow obstruction.  Overall comments: Severe but stable obstruction.      Results for orders " placed in visit on 11/19/19    Pulmonary Function Test    Narrative  Pulmonary Function Test  Performed by: Jostin Mcqueen APRN  Authorized by: Jostin Mcqueen APRN    Pre Drug  FVC: 53%  FEV1: 44%  FEF 25-75%: 24%  FEV1/FVC: 62.9%      Results for orders placed in visit on 11/26/18    Pulmonary Function Test               Assessment and Plan      Diagnoses and all orders for this visit:    1. Nocturnal hypoxemia (Primary)  Comments:  Patient wishes to discontinue oxygen.  We will get overnight on room air to assure safety.  Orders:  -     Overnight Sleep Oximetry Study; Future    2. Stage 3 severe COPD by GOLD classification (HCC)  Comments:  Reported as stable.  Continue daily Spiriva and use prednisone as needed.  Flow-volume loop on return.    3. Panlobular emphysema (HCC)  Comments:  Stable.  Continue annual chest scans.  Continue current inhaler regimen.    4. Former smoker  Comments:  Quit in 2017.  Continue annual chest scans.      SAMMY Hodges  9/27/2022  14:06 CDT    Follow Up   Return in about 9 months (around 6/27/2023).    Patient was given instructions and counseling regarding her condition or for health maintenance advice. Please see specific information pulled into the AVS if appropriate.

## 2022-10-03 DIAGNOSIS — G47.34 NOCTURNAL HYPOXEMIA: Chronic | ICD-10-CM

## 2023-04-18 RX ORDER — DILTIAZEM HYDROCHLORIDE 240 MG/1
240 CAPSULE, COATED, EXTENDED RELEASE ORAL DAILY
Qty: 90 CAPSULE | Refills: 3 | Status: SHIPPED | OUTPATIENT
Start: 2023-04-18

## 2023-08-23 ENCOUNTER — HOSPITAL ENCOUNTER (OUTPATIENT)
Dept: CARDIOLOGY | Facility: HOSPITAL | Age: 83
Discharge: HOME | End: 2023-08-23
Admitting: NURSE PRACTITIONER
Payer: MEDICARE

## 2023-08-23 VITALS
WEIGHT: 144 LBS | BODY MASS INDEX: 25.52 KG/M2 | DIASTOLIC BLOOD PRESSURE: 62 MMHG | SYSTOLIC BLOOD PRESSURE: 116 MMHG | HEIGHT: 63 IN

## 2023-08-23 DIAGNOSIS — I35.1 AORTIC VALVE INSUFFICIENCY, ETIOLOGY OF CARDIAC VALVE DISEASE UNSPECIFIED: ICD-10-CM

## 2023-08-23 DIAGNOSIS — I34.0 MITRAL VALVE INSUFFICIENCY, UNSPECIFIED ETIOLOGY: ICD-10-CM

## 2023-08-23 PROCEDURE — 93306 TTE W/DOPPLER COMPLETE: CPT

## 2023-08-25 LAB
BH CV ECHO MEAS - AO MAX PG: 13.7 MMHG
BH CV ECHO MEAS - AO MEAN PG: 7.5 MMHG
BH CV ECHO MEAS - AO ROOT DIAM: 2.7 CM
BH CV ECHO MEAS - AO V2 MAX: 185 CM/SEC
BH CV ECHO MEAS - AO V2 VTI: 34.6 CM
BH CV ECHO MEAS - AVA(I,D): 1.17 CM2
BH CV ECHO MEAS - EDV(CUBED): 61.6 ML
BH CV ECHO MEAS - EDV(MOD-SP4): 40 ML
BH CV ECHO MEAS - EF(MOD-SP4): 67.3 %
BH CV ECHO MEAS - ESV(CUBED): 14.7 ML
BH CV ECHO MEAS - ESV(MOD-SP4): 13.1 ML
BH CV ECHO MEAS - FS: 38 %
BH CV ECHO MEAS - IVS/LVPW: 1.01 CM
BH CV ECHO MEAS - IVSD: 0.98 CM
BH CV ECHO MEAS - LA DIMENSION: 3.3 CM
BH CV ECHO MEAS - LAT PEAK E' VEL: 11.2 CM/SEC
BH CV ECHO MEAS - LV DIASTOLIC VOL/BSA (35-75): 23.8 CM2
BH CV ECHO MEAS - LV MASS(C)D: 119.2 GRAMS
BH CV ECHO MEAS - LV MAX PG: 3.2 MMHG
BH CV ECHO MEAS - LV MEAN PG: 2 MMHG
BH CV ECHO MEAS - LV SYSTOLIC VOL/BSA (12-30): 7.8 CM2
BH CV ECHO MEAS - LV V1 MAX: 89.5 CM/SEC
BH CV ECHO MEAS - LV V1 VTI: 17.8 CM
BH CV ECHO MEAS - LVIDD: 4 CM
BH CV ECHO MEAS - LVIDS: 2.45 CM
BH CV ECHO MEAS - LVOT AREA: 2.27 CM2
BH CV ECHO MEAS - LVOT DIAM: 1.7 CM
BH CV ECHO MEAS - LVPWD: 0.96 CM
BH CV ECHO MEAS - MED PEAK E' VEL: 7.5 CM/SEC
BH CV ECHO MEAS - MV DEC TIME: 0.18 MSEC
BH CV ECHO MEAS - MV E MAX VEL: 135 CM/SEC
BH CV ECHO MEAS - RAP SYSTOLE: 8 MMHG
BH CV ECHO MEAS - RVSP: 48 MMHG
BH CV ECHO MEAS - SI(MOD-SP4): 16 ML/M2
BH CV ECHO MEAS - SV(LVOT): 40.4 ML
BH CV ECHO MEAS - SV(MOD-SP4): 26.9 ML
BH CV ECHO MEAS - TR MAX PG: 39.7 MMHG
BH CV ECHO MEAS - TR MAX VEL: 315 CM/SEC
BH CV ECHO MEASUREMENTS AVERAGE E/E' RATIO: 14.44
BH CV XLRA - RV BASE: 3.4 CM
BH CV XLRA - RV LENGTH: 7.1 CM
BH CV XLRA - RV MID: 3 CM
LEFT ATRIUM VOLUME INDEX: 38.5 ML/M2
LEFT ATRIUM VOLUME: 57.9 ML

## 2024-02-26 DIAGNOSIS — J44.9 STAGE 3 SEVERE COPD BY GOLD CLASSIFICATION: Chronic | ICD-10-CM

## 2024-02-26 DIAGNOSIS — J44.9 STAGE 3 SEVERE COPD BY GOLD CLASSIFICATION: Primary | ICD-10-CM

## 2024-02-26 NOTE — TELEPHONE ENCOUNTER
Patient's  called to request refills on her Ipratropium nebulizer solution. He states Jostin CHRISTIANSON did prescribe this for her previously but the prescription has . Patient aware to check the pharmacy tomorrow.  Rx Refill Note  Requested Prescriptions     Pending Prescriptions Disp Refills    ipratropium (ATROVENT) 0.02 % nebulizer solution 37.5 mL 3     Sig: Take 2.5 mL by nebulization 4 (Four) Times a Day As Needed for Wheezing or Shortness of Air.      Last office visit with prescribing clinician: 2023   Last telemedicine visit with prescribing clinician: Visit date not found   Next office visit with prescribing clinician: 3/28/2024                         Would you like a call back once the refill request has been completed: [] Yes [] No    If the office needs to give you a call back, can they leave a voicemail: [] Yes [] No    Ethan Marie, [facility]  24, 14:24 CST

## 2024-02-26 NOTE — TELEPHONE ENCOUNTER
Caller: Merari Gaspar    Relationship: Self    Best call back number: [phone]     Requested Prescriptions:   Requested Prescriptions     Pending Prescriptions Disp Refills    tiotropium bromide monohydrate (SPIRIVA RESPIMAT) 2.5 MCG/ACT aerosol solution inhaler 2 each 0     Sig: Inhale 2 puffs Daily.        Pharmacy where request should be sent:  [facility]    Last office visit with prescribing clinician: 2023   Last telemedicine visit with prescribing clinician: Visit date not found   Next office visit with prescribing clinician: 3/28/2024     Additional details provided by patient: PT HAS MEDICATION LEFT BUT  2020    Does the patient have less than a 3 day supply:  [x] Yes  [] No    If the office needs to give you a call back, can they leave a voicemail: [x] Yes [] No    Mahendra Clinton Rep   24 09:04 CST

## 2024-03-28 ENCOUNTER — OFFICE VISIT (OUTPATIENT)
Dept: PULMONOLOGY | Facility: CLINIC | Age: 84
End: 2024-03-28
Payer: MEDICARE

## 2024-03-28 VITALS
HEART RATE: 86 BPM | SYSTOLIC BLOOD PRESSURE: 126 MMHG | DIASTOLIC BLOOD PRESSURE: 86 MMHG | BODY MASS INDEX: 26.05 KG/M2 | OXYGEN SATURATION: 96 % | WEIGHT: 147 LBS | HEIGHT: 63 IN

## 2024-03-28 DIAGNOSIS — G47.34 NOCTURNAL HYPOXEMIA: Chronic | ICD-10-CM

## 2024-03-28 DIAGNOSIS — Z87.891 FORMER SMOKER: ICD-10-CM

## 2024-03-28 DIAGNOSIS — J44.9 STAGE 3 SEVERE COPD BY GOLD CLASSIFICATION: Primary | Chronic | ICD-10-CM

## 2024-03-28 NOTE — PROGRESS NOTES
"Chief Complaint  COPD    Subjective    History of Present Illness      Merari Gaspar presents to [facility] for:    History of Present Illness   Management of severe COPD, emphysema and nocturnal hypoxia.  She quit smoking 7 years ago.  She had her annual chest x-ray done at [facility] earlier this week with no acute findings.  She reports that her pulmonary status is \"off and on\".  She utilizes Spiriva, albuterol and or Atrovent on an as-needed basis only.  She continues to wear and benefit from nighttime oxygen.  She had updated spirometry today that shows severe but stable obstruction.  She has had no recent exacerbations of her COPD.  She stays up-to-date on vaccines.  Objective   Vital Signs:   /86   Pulse 86   Ht 160 cm (63\")   Wt 66.7 kg (147 lb)   SpO2 96% Comment: RA  BMI 26.04 kg/m²     Physical Exam  Vitals reviewed.   Constitutional:       General: She is not in acute distress.     Appearance: Normal appearance.   HENT:      Head: Normocephalic and atraumatic.   Cardiovascular:      Rate and Rhythm: Normal rate and regular rhythm.   Pulmonary:      Breath sounds: Decreased breath sounds (At bases only, otherwise clear) present.   Musculoskeletal:      Cervical back: Normal range of motion.   Skin:     General: Skin is warm and dry.   Neurological:      Mental Status: She is alert and oriented to person, place, and time.   Psychiatric:         Mood and Affect: Mood normal.         Behavior: Behavior normal.        Result Review :   The following data was reviewed by: SAMMY Hodges on 03/28/2024:  XR Chest 2 View (06/27/2023 09:21)   Results for orders placed in visit on 03/28/24    Spirometry    Narrative  Spirometry    Performed by: Daksha Johnston, RRT  Authorized by: Jostin Mcqueen APRN  Pre Drug % Predicted  FVC: 46%  FEV1: 39%  FEF 25-75%: 26%  FEV1/FVC: " 64%    Interpretation  Spirometry  Spirometry shows severe obstruction. There is reduced midflow suggesting small airway/airflow obstruction.      Results for orders placed in visit on 03/18/22    Pulmonary Function Test    Narrative  Pulmonary Function Test  Performed by: Daksha Johnston, RRT  Authorized by: Jostin Mcqueen APRN    Pre Drug % Predicted  FVC: 50%  FEV1: 42%  FEF 25-75%: 25%  FEV1/FVC: 63%    Interpretation  Spirometry  Spirometry shows severe obstruction. There is reduced midflow suggesting small airway/airflow obstruction.  Overall comments: Severe but stable obstruction.      Results for orders placed in visit on 11/19/19    Pulmonary Function Test    Narrative  Pulmonary Function Test  Performed by: Jostin Mcqueen APRN  Authorized by: Jostin Mcqueen APRN    Pre Drug  FVC: 53%  FEV1: 44%  FEF 25-75%: 24%  FEV1/FVC: 62.9%               Assessment and Plan      Diagnoses and all orders for this visit:    1. Stage 3 severe COPD by GOLD classification (Primary)  Comments:  Stable.  Continue Spiriva, albuterol and or Atrovent as needed.  Flow-volume loop on return.  Orders:  -     Spirometry    2. Nocturnal hypoxemia  Comments:  Stable.  She continues to wear and benefit from nocturnal oxygen.  Continue use.    3. Former smoker  Comments:  She quit 7 years ago.  Continue annual chest x-ray.  Current chest x-ray reviewed on the [facility] site.      SAMMY Hodges  3/28/2024  11:51 CDT    Follow Up   Return in about 1 year (around 3/28/2025) for FVL.    Patient was given instructions and counseling regarding her condition or for health maintenance advice. Please see specific information pulled into the AVS if appropriate.

## 2024-03-28 NOTE — PROCEDURES
Spirometry    Performed by: Daksha Johnston, RRT  Authorized by: Jostin Mcqueen APRN     Pre Drug % Predicted    FVC: 46%   FEV1: 39%   FEF 25-75%: 26%   FEV1/FVC: 64%    Interpretation   Spirometry   Spirometry shows severe obstruction. There is reduced midflow suggesting small airway/airflow obstruction.

## 2024-04-29 RX ORDER — DILTIAZEM HYDROCHLORIDE 240 MG/1
240 CAPSULE, COATED, EXTENDED RELEASE ORAL DAILY
Qty: 90 CAPSULE | Refills: 3 | Status: SHIPPED | OUTPATIENT
Start: 2024-04-29

## 2024-08-20 ENCOUNTER — OFFICE VISIT (OUTPATIENT)
Dept: CARDIOLOGY | Facility: CLINIC | Age: 84
End: 2024-08-20
Payer: MEDICARE

## 2024-08-20 VITALS
BODY MASS INDEX: 25.73 KG/M2 | DIASTOLIC BLOOD PRESSURE: 76 MMHG | WEIGHT: 145.2 LBS | SYSTOLIC BLOOD PRESSURE: 134 MMHG | HEIGHT: 63 IN | OXYGEN SATURATION: 90 % | HEART RATE: 89 BPM

## 2024-08-20 DIAGNOSIS — I10 ESSENTIAL HYPERTENSION: Chronic | ICD-10-CM

## 2024-08-20 DIAGNOSIS — I48.21 PERMANENT ATRIAL FIBRILLATION: Primary | ICD-10-CM

## 2024-08-20 DIAGNOSIS — E78.00 PURE HYPERCHOLESTEROLEMIA: Chronic | ICD-10-CM

## 2024-08-20 DIAGNOSIS — I51.9 RIGHT VENTRICULAR DYSFUNCTION: ICD-10-CM

## 2024-08-20 NOTE — PROGRESS NOTES
Subjective:     Encounter Date: 08/20/24      Patient ID: Merari Gaspar is a 84 y.o. female.    Chief Complaint: f/u afib    History of Present Illness     Mrs. Gaspar is a pleaseant 84 year old Dr. Strong has followed since a May '18 admission for new onset AF with RVR. Was found on echo to have dilation of RV, and underwent CHRISTINE-guided DCCV back to NSR. However, she quickly reverted to AF within an hour. She was discharged on eliquis and cardizem 360mg for rate control.    At her visit on 6/21/18, her main complaint was leg swelling. Dr. Strong explained that this was likely from right ventricular dysfunction d/t underlying lung disease, and that the best treatment was elevation, compressions stockings, and lasix only as needed.  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      At her visit in May 2020, she reported that she had been started on lasix daily.  Her leg swelling has resolved on this.  With regards to atrial fibrillation, she continued to deny any palpitations, orthopnea, dizziness, lightheadedness, or chest pain.  She was seeking to reduce her medication burden if at all possible, so Dr. Strong did advise that she could cut back her Cardizem dose to 180 mg daily but counseled her to call us back if she noted worsening symptoms such as palpitations indicative of a rapid ventricular response.      She came to see Dr. Strong in June 2021 and was doing very well.  Energy level was good and she was not noticing palpitations, shortness of breath or chest pain.  She was tolerating her medications.  Her heart rate per EKG that day was 120 bpm, so diltiazem was uptitrated to 240 mg daily for better rate control.    At her last couple of visits she has been doing well.  At her last visit I did order an echocardiogram to follow-up on her valvular disease.  See results below.  She has stable exertional dyspnea  that she relates to her COPD and confirms this is unchanged today.  At her last visit she was taking Lasix on rare occasions but she now reports she takes this daily because she has significant swelling without it.  Taking Lasix does not really improve her breathing per her report.  Weight is stable.  She denies chest pain, palpitations, orthopnea, PND, syncope or presyncope.  Blood pressure is well-controlled.  Heart rate is well-controlled.  She denies any signs of major bleeding on Eliquis.  She denies falls.    The following portions of the patient's history were reviewed and updated as appropriate: allergies, current medications, past family history, past medical history, past social history, past surgical history and problem list.    Review of Systems   Constitutional: Negative for malaise/fatigue.   Cardiovascular:  Positive for dyspnea on exertion. Negative for chest pain, claudication, leg swelling, near-syncope, orthopnea, palpitations, paroxysmal nocturnal dyspnea and syncope.   Respiratory:  Positive for shortness of breath. Negative for cough.    Hematologic/Lymphatic: Does not bruise/bleed easily.   Musculoskeletal:  Negative for falls.   Gastrointestinal:  Negative for bloating.   Neurological:  Negative for dizziness, light-headedness and weakness.     Current Outpatient Medications:     apixaban (ELIQUIS) 5 MG tablet tablet, Take 1 tablet by mouth Every 12 (Twelve) Hours., Disp: 60 tablet, Rfl: 2    coenzyme Q10 100 MG capsule, Take 1 capsule by mouth Every Night., Disp: , Rfl:     dilTIAZem CD (CARDIZEM CD) 240 MG 24 hr capsule, Take 1 capsule by mouth once daily, Disp: 90 capsule, Rfl: 3    furosemide (LASIX) 20 MG tablet, Take 1 tablet by mouth Daily As Needed., Disp: , Rfl: 0    ipratropium (ATROVENT) 0.02 % nebulizer solution, Take 2.5 mL by nebulization 4 (Four) Times a Day As Needed for Wheezing or Shortness of Air for up to 30 days., Disp: 2.5 mL, Rfl: 5    ipratropium (ATROVENT) 0.02 %  "nebulizer solution, Take 2.5 mL by nebulization 4 (Four) Times a Day As Needed for Wheezing or Shortness of Air., Disp: 37.5 mL, Rfl: 5    lovastatin (MEVACOR) 20 MG tablet, Take 1 tablet by mouth Every Other Day. Alternate half-tablet (10mg) dose every other day., Disp: , Rfl:     Multiple Vitamins-Minerals (MULTIVITAL PO), Take 1 tablet by mouth Daily., Disp: , Rfl:     O2 (OXYGEN), Inhale 2 L 1 (One) Time., Disp: , Rfl:     tiotropium bromide monohydrate (SPIRIVA RESPIMAT) 2.5 MCG/ACT aerosol solution inhaler, Inhale 2 puffs Daily., Disp: 4 g, Rfl: 11       Objective:      /76   Pulse 89   Ht 160 cm (63\")   Wt 65.9 kg (145 lb 3.2 oz)   SpO2 90%   BMI 25.72 kg/m²      Physical Exam  Constitutional:       General: She is not in acute distress.     Appearance: She is well-developed. She is not diaphoretic.   HENT:      Head: Normocephalic and atraumatic.   Eyes:      Pupils: Pupils are equal, round, and reactive to light.   Neck:      Vascular: No JVD.   Cardiovascular:      Rate and Rhythm: Normal rate. Rhythm irregular.      Heart sounds: Normal heart sounds. No murmur heard.  Pulmonary:      Effort: Pulmonary effort is normal. No respiratory distress.      Breath sounds: Decreased breath sounds present.   Chest:      Chest wall: No tenderness.   Abdominal:      General: Bowel sounds are normal. There is no distension.      Palpations: Abdomen is soft.      Tenderness: There is no abdominal tenderness.   Musculoskeletal:         General: Normal range of motion.      Cervical back: Normal range of motion and neck supple.   Skin:     General: Skin is warm and dry.   Neurological:      Mental Status: She is alert and oriented to person, place, and time.      Cranial Nerves: No cranial nerve deficit.   Psychiatric:         Behavior: Behavior normal.         Lab Review:   Lab Results   Component Value Date    GLUCOSE 100 05/22/2018    CALCIUM 9.0 05/22/2018     (L) 05/22/2018    K 4.3 05/22/2018    CO2 " >40.0 (C) 05/22/2018    CL 87 (L) 05/22/2018    BUN 13 05/22/2018    CREATININE 0.59 05/22/2018    EGFRIFNONA 99 05/22/2018    BCR 22.0 05/22/2018    ANIONGAP  05/22/2018      Comment:      Unable to calculate Anion Gap.     Lab Results   Component Value Date    TSH 2.480 05/17/2018           ECG 12 Lead    Date/Time: 8/20/2024 2:20 PM  Performed by: Concha Dill APRN    Authorized by: Concha Dill APRN  Comparison: compared with previous ECG from 7/21/2023  Similar to previous ECG  Rhythm: atrial fibrillation  Ectopy: infrequent PVCs  BPM: 89    Clinical impression: abnormal EKG          5/2018 CHRISTINE:    Normal LVEF.  Right ventricular cavity is dilated.  Findings suggestive of pulmonary hypertension.  Mild aortic valve regurgitation is present.  Mild mitral valve regurgitation is present  No thrombus in left atrial appendage with normal exit velocities - ok to proceed with cardioversion.  Negative bubble study    8/2023 echo:      Left ventricular systolic function is normal. Left ventricular ejection fraction appears to be 61 - 65%.    Mild aortic insufficiency.    Estimated right ventricular systolic pressure from tricuspid regurgitation is moderately elevated (45-55 mmHg).    The right ventricular cavity is borderline dilated, with mildly reduced systolic function.    Biatrial enlargement.    Compared to most recent exam from 5/17/2018, LV systolic function appears improved, regurgitant lesions appear less severe, and pericardial effusion is no longer present.         Assessment/Plan:     Problem List Items Addressed This Visit (all established, stable except where otherwise noted)         Cardiac and Vasculature    Essential hypertension (Chronic)-stable, well controlled overall    Relevant Medications    dilTIAZem CD (Cartia XT) 240 MG 24 hr capsule    Pure hypercholesterolemia (Chronic)-on statin, followed by PCP    A-fib (CMS/[facility]) - Primary--permanent    Overview     CHADS-VASc Risk Assessment               4       Total Score        1 Hypertension    2 Age >/= 75    1 Sex: Female                   Relevant Medications    dilTIAZem CD (Cartia XT) 240 MG 24 hr capsule    Right ventricular dysfunction    Overview     Likely due to underlying lung disease.           Relevant Medications    dilTIAZem CD (Cartia XT) 240 MG 24 hr capsule          Other Visit Diagnoses       Permanent atrial fibrillation     Relevant Medications    dilTIAZem CD (Cartia XT) 240 MG 24 hr capsule                   Recommendations/plans    -Continue eliquis 5mg po bid for CVA prophylaxis  -Continue diltiazem 240 mg daily for rate control.   -Continue Lasix-I recommended as needed use for weight gain of greater than 2 pounds overnight or greater than 4 pounds in 2 days or worsening dyspnea/orthopnea/PND.  Continue monitoring renal function while on loop diuretic through her primary care physician.    F/u 12 months, or sooner with any new or worsening symptoms    Concha Dill, SAMMY  08/20/24  14:24 CDT    I spent 34 minutes caring for Merari on this date of service. This time includes time spent by me in the following activities: preparing for the visit, reviewing tests, obtaining and/or reviewing a separately obtained history, performing a medically appropriate examination and/or evaluation, counseling and educating the patient/family/caregiver, ordering medications, tests, or procedures, and documenting information in the medical record

## 2024-11-30 ENCOUNTER — APPOINTMENT (OUTPATIENT)
Dept: CT IMAGING | Facility: HOSPITAL | Age: 84
End: 2024-11-30
Payer: MEDICARE

## 2024-11-30 ENCOUNTER — APPOINTMENT (OUTPATIENT)
Dept: GENERAL RADIOLOGY | Facility: HOSPITAL | Age: 84
End: 2024-11-30
Payer: MEDICARE

## 2024-11-30 ENCOUNTER — HOSPITAL ENCOUNTER (OUTPATIENT)
Facility: HOSPITAL | Age: 84
Setting detail: OBSERVATION
Discharge: HOME | End: 2024-12-02
Attending: INTERNAL MEDICINE | Admitting: FAMILY MEDICINE
Payer: MEDICARE

## 2024-11-30 DIAGNOSIS — I48.91 ATRIAL FIBRILLATION WITH RVR: ICD-10-CM

## 2024-11-30 DIAGNOSIS — E87.70 HYPERVOLEMIA, UNSPECIFIED HYPERVOLEMIA TYPE: ICD-10-CM

## 2024-11-30 DIAGNOSIS — J44.9 STAGE 3 SEVERE COPD BY GOLD CLASSIFICATION: ICD-10-CM

## 2024-11-30 DIAGNOSIS — R09.02 HYPOXIA: ICD-10-CM

## 2024-11-30 DIAGNOSIS — J40 BRONCHITIS: Primary | ICD-10-CM

## 2024-11-30 LAB
ALBUMIN SERPL-MCNC: 4 G/DL (ref 3.5–5.2)
ALBUMIN/GLOB SERPL: 1.1 G/DL
ALP SERPL-CCNC: 119 U/L (ref 39–117)
ALT SERPL W P-5'-P-CCNC: 47 U/L (ref 1–33)
ANION GAP SERPL CALCULATED.3IONS-SCNC: 9 MMOL/L (ref 5–15)
AST SERPL-CCNC: 40 U/L (ref 1–32)
B PARAPERT DNA SPEC QL NAA+PROBE: NOT DETECTED
B PERT DNA SPEC QL NAA+PROBE: NOT DETECTED
BASOPHILS # BLD AUTO: 0.06 10*3/MM3 (ref 0–0.2)
BASOPHILS NFR BLD AUTO: 0.4 % (ref 0–1.5)
BILIRUB SERPL-MCNC: 0.5 MG/DL (ref 0–1.2)
BUN SERPL-MCNC: 15 MG/DL (ref 8–23)
BUN/CREAT SERPL: 24.2 (ref 7–25)
C PNEUM DNA NPH QL NAA+NON-PROBE: NOT DETECTED
CALCIUM SPEC-SCNC: 8.9 MG/DL (ref 8.6–10.5)
CHLORIDE SERPL-SCNC: 94 MMOL/L (ref 98–107)
CO2 SERPL-SCNC: 29 MMOL/L (ref 22–29)
CREAT SERPL-MCNC: 0.62 MG/DL (ref 0.57–1)
D DIMER PPP FEU-MCNC: 0.56 MCGFEU/ML (ref 0–0.84)
D-LACTATE SERPL-SCNC: 1.1 MMOL/L (ref 0.5–2)
DEPRECATED RDW RBC AUTO: 43.8 FL (ref 37–54)
EGFRCR SERPLBLD CKD-EPI 2021: 87.9 ML/MIN/1.73
EOSINOPHIL # BLD AUTO: 0.03 10*3/MM3 (ref 0–0.4)
EOSINOPHIL NFR BLD AUTO: 0.2 % (ref 0.3–6.2)
ERYTHROCYTE [DISTWIDTH] IN BLOOD BY AUTOMATED COUNT: 12.6 % (ref 12.3–15.4)
FLUAV SUBTYP SPEC NAA+PROBE: NOT DETECTED
FLUBV RNA ISLT QL NAA+PROBE: NOT DETECTED
GEN 5 2HR TROPONIN T REFLEX: <6 NG/L
GLOBULIN UR ELPH-MCNC: 3.5 GM/DL
GLUCOSE SERPL-MCNC: 116 MG/DL (ref 65–99)
HADV DNA SPEC NAA+PROBE: NOT DETECTED
HCOV 229E RNA SPEC QL NAA+PROBE: NOT DETECTED
HCOV HKU1 RNA SPEC QL NAA+PROBE: NOT DETECTED
HCOV NL63 RNA SPEC QL NAA+PROBE: NOT DETECTED
HCOV OC43 RNA SPEC QL NAA+PROBE: NOT DETECTED
HCT VFR BLD AUTO: 38 % (ref 34–46.6)
HGB BLD-MCNC: 12.3 G/DL (ref 12–15.9)
HMPV RNA NPH QL NAA+NON-PROBE: NOT DETECTED
HPIV1 RNA ISLT QL NAA+PROBE: NOT DETECTED
HPIV2 RNA SPEC QL NAA+PROBE: NOT DETECTED
HPIV3 RNA NPH QL NAA+PROBE: NOT DETECTED
HPIV4 P GENE NPH QL NAA+PROBE: NOT DETECTED
IMM GRANULOCYTES # BLD AUTO: 0.05 10*3/MM3 (ref 0–0.05)
IMM GRANULOCYTES NFR BLD AUTO: 0.3 % (ref 0–0.5)
LYMPHOCYTES # BLD AUTO: 2.49 10*3/MM3 (ref 0.7–3.1)
LYMPHOCYTES NFR BLD AUTO: 15.9 % (ref 19.6–45.3)
M PNEUMO IGG SER IA-ACNC: NOT DETECTED
MCH RBC QN AUTO: 30.7 PG (ref 26.6–33)
MCHC RBC AUTO-ENTMCNC: 32.4 G/DL (ref 31.5–35.7)
MCV RBC AUTO: 94.8 FL (ref 79–97)
MONOCYTES # BLD AUTO: 1.76 10*3/MM3 (ref 0.1–0.9)
MONOCYTES NFR BLD AUTO: 11.3 % (ref 5–12)
NEUTROPHILS NFR BLD AUTO: 11.24 10*3/MM3 (ref 1.7–7)
NEUTROPHILS NFR BLD AUTO: 71.9 % (ref 42.7–76)
NRBC BLD AUTO-RTO: 0 /100 WBC (ref 0–0.2)
NT-PROBNP SERPL-MCNC: 1820 PG/ML (ref 0–1800)
PLATELET # BLD AUTO: 277 10*3/MM3 (ref 140–450)
PMV BLD AUTO: 8.7 FL (ref 6–12)
POTASSIUM SERPL-SCNC: 4.2 MMOL/L (ref 3.5–5.2)
PROT SERPL-MCNC: 7.5 G/DL (ref 6–8.5)
RBC # BLD AUTO: 4.01 10*6/MM3 (ref 3.77–5.28)
RHINOVIRUS RNA SPEC NAA+PROBE: NOT DETECTED
RSV RNA NPH QL NAA+NON-PROBE: NOT DETECTED
SARS-COV-2 RNA NPH QL NAA+NON-PROBE: NOT DETECTED
SODIUM SERPL-SCNC: 132 MMOL/L (ref 136–145)
TROPONIN T DELTA: NORMAL
TROPONIN T SERPL HS-MCNC: 6 NG/L
WBC NRBC COR # BLD AUTO: 15.63 10*3/MM3 (ref 3.4–10.8)

## 2024-11-30 PROCEDURE — G0378 HOSPITAL OBSERVATION PER HR: HCPCS

## 2024-11-30 PROCEDURE — 0202U NFCT DS 22 TRGT SARS-COV-2: CPT | Performed by: INTERNAL MEDICINE

## 2024-11-30 PROCEDURE — 25510000001 IOPAMIDOL PER 1 ML: Performed by: INTERNAL MEDICINE

## 2024-11-30 PROCEDURE — 80053 COMPREHEN METABOLIC PANEL: CPT | Performed by: INTERNAL MEDICINE

## 2024-11-30 PROCEDURE — 93010 ELECTROCARDIOGRAM REPORT: CPT | Performed by: EMERGENCY MEDICINE

## 2024-11-30 PROCEDURE — 71275 CT ANGIOGRAPHY CHEST: CPT

## 2024-11-30 PROCEDURE — 83880 ASSAY OF NATRIURETIC PEPTIDE: CPT | Performed by: INTERNAL MEDICINE

## 2024-11-30 PROCEDURE — 96375 TX/PRO/DX INJ NEW DRUG ADDON: CPT

## 2024-11-30 PROCEDURE — 99214 OFFICE O/P EST MOD 30 MIN: CPT | Performed by: INTERNAL MEDICINE

## 2024-11-30 PROCEDURE — 96368 THER/DIAG CONCURRENT INF: CPT

## 2024-11-30 PROCEDURE — 85379 FIBRIN DEGRADATION QUANT: CPT | Performed by: INTERNAL MEDICINE

## 2024-11-30 PROCEDURE — 71045 X-RAY EXAM CHEST 1 VIEW: CPT

## 2024-11-30 PROCEDURE — 96365 THER/PROPH/DIAG IV INF INIT: CPT

## 2024-11-30 PROCEDURE — 96366 THER/PROPH/DIAG IV INF ADDON: CPT

## 2024-11-30 PROCEDURE — 25010000002 CEFTRIAXONE PER 250 MG: Performed by: INTERNAL MEDICINE

## 2024-11-30 PROCEDURE — 93005 ELECTROCARDIOGRAM TRACING: CPT | Performed by: INTERNAL MEDICINE

## 2024-11-30 PROCEDURE — 96376 TX/PRO/DX INJ SAME DRUG ADON: CPT

## 2024-11-30 PROCEDURE — 36415 COLL VENOUS BLD VENIPUNCTURE: CPT

## 2024-11-30 PROCEDURE — 85025 COMPLETE CBC W/AUTO DIFF WBC: CPT | Performed by: INTERNAL MEDICINE

## 2024-11-30 PROCEDURE — 84484 ASSAY OF TROPONIN QUANT: CPT | Performed by: INTERNAL MEDICINE

## 2024-11-30 PROCEDURE — 83605 ASSAY OF LACTIC ACID: CPT | Performed by: INTERNAL MEDICINE

## 2024-11-30 PROCEDURE — 25010000002 DIGOXIN PER 500 MCG: Performed by: INTERNAL MEDICINE

## 2024-11-30 PROCEDURE — 99285 EMERGENCY DEPT VISIT HI MDM: CPT

## 2024-11-30 PROCEDURE — 25010000002 FUROSEMIDE PER 20 MG: Performed by: INTERNAL MEDICINE

## 2024-11-30 RX ORDER — SODIUM CHLORIDE 0.9 % (FLUSH) 0.9 %
10 SYRINGE (ML) INJECTION AS NEEDED
Status: DISCONTINUED | OUTPATIENT
Start: 2024-11-30 | End: 2024-12-02 | Stop reason: HOSPADM

## 2024-11-30 RX ORDER — ONDANSETRON 2 MG/ML
4 INJECTION INTRAMUSCULAR; INTRAVENOUS EVERY 6 HOURS PRN
Status: DISCONTINUED | OUTPATIENT
Start: 2024-11-30 | End: 2024-11-30

## 2024-11-30 RX ORDER — FUROSEMIDE 10 MG/ML
20 INJECTION INTRAMUSCULAR; INTRAVENOUS ONCE
Status: COMPLETED | OUTPATIENT
Start: 2024-11-30 | End: 2024-11-30

## 2024-11-30 RX ORDER — SODIUM CHLORIDE 9 MG/ML
40 INJECTION, SOLUTION INTRAVENOUS AS NEEDED
Status: DISCONTINUED | OUTPATIENT
Start: 2024-11-30 | End: 2024-12-02 | Stop reason: HOSPADM

## 2024-11-30 RX ORDER — ACETAMINOPHEN 325 MG/1
650 TABLET ORAL EVERY 4 HOURS PRN
Status: DISCONTINUED | OUTPATIENT
Start: 2024-11-30 | End: 2024-12-02 | Stop reason: HOSPADM

## 2024-11-30 RX ORDER — SODIUM CHLORIDE 0.9 % (FLUSH) 0.9 %
10 SYRINGE (ML) INJECTION EVERY 12 HOURS SCHEDULED
Status: DISCONTINUED | OUTPATIENT
Start: 2024-11-30 | End: 2024-12-02 | Stop reason: HOSPADM

## 2024-11-30 RX ORDER — ACETAMINOPHEN 650 MG/1
650 SUPPOSITORY RECTAL EVERY 4 HOURS PRN
Status: DISCONTINUED | OUTPATIENT
Start: 2024-11-30 | End: 2024-12-02 | Stop reason: HOSPADM

## 2024-11-30 RX ORDER — DIAZEPAM 2 MG/1
2 TABLET ORAL 2 TIMES DAILY PRN
Status: DISCONTINUED | OUTPATIENT
Start: 2024-11-30 | End: 2024-12-02 | Stop reason: HOSPADM

## 2024-11-30 RX ORDER — DILTIAZEM HYDROCHLORIDE 120 MG/1
240 CAPSULE, COATED, EXTENDED RELEASE ORAL DAILY
Status: DISCONTINUED | OUTPATIENT
Start: 2024-11-30 | End: 2024-12-02 | Stop reason: HOSPADM

## 2024-11-30 RX ORDER — IOPAMIDOL 755 MG/ML
100 INJECTION, SOLUTION INTRAVASCULAR
Status: COMPLETED | OUTPATIENT
Start: 2024-11-30 | End: 2024-11-30

## 2024-11-30 RX ORDER — ATORVASTATIN CALCIUM 10 MG/1
10 TABLET, FILM COATED ORAL EVERY OTHER DAY
Status: DISCONTINUED | OUTPATIENT
Start: 2024-11-30 | End: 2024-12-02 | Stop reason: HOSPADM

## 2024-11-30 RX ORDER — DILTIAZEM HCL/D5W 125 MG/125
5-15 PLASTIC BAG, INJECTION (ML) INTRAVENOUS
Status: DISCONTINUED | OUTPATIENT
Start: 2024-11-30 | End: 2024-12-02

## 2024-11-30 RX ORDER — MORPHINE SULFATE 2 MG/ML
2 INJECTION, SOLUTION INTRAMUSCULAR; INTRAVENOUS ONCE
Status: DISCONTINUED | OUTPATIENT
Start: 2024-11-30 | End: 2024-11-30

## 2024-11-30 RX ORDER — ACYCLOVIR 200 MG/1
400 CAPSULE ORAL 2 TIMES DAILY
Status: DISCONTINUED | OUTPATIENT
Start: 2024-11-30 | End: 2024-12-02 | Stop reason: HOSPADM

## 2024-11-30 RX ORDER — DILTIAZEM HYDROCHLORIDE 5 MG/ML
10 INJECTION INTRAVENOUS ONCE
Status: COMPLETED | OUTPATIENT
Start: 2024-11-30 | End: 2024-11-30

## 2024-11-30 RX ORDER — DIGOXIN 0.25 MG/ML
250 INJECTION INTRAMUSCULAR; INTRAVENOUS ONCE
Status: COMPLETED | OUTPATIENT
Start: 2024-11-30 | End: 2024-11-30

## 2024-11-30 RX ORDER — METOPROLOL TARTRATE 1 MG/ML
5 INJECTION, SOLUTION INTRAVENOUS EVERY 4 HOURS PRN
Status: DISCONTINUED | OUTPATIENT
Start: 2024-11-30 | End: 2024-12-02 | Stop reason: HOSPADM

## 2024-11-30 RX ORDER — ACETAMINOPHEN 160 MG/5ML
650 SOLUTION ORAL EVERY 4 HOURS PRN
Status: DISCONTINUED | OUTPATIENT
Start: 2024-11-30 | End: 2024-12-02 | Stop reason: HOSPADM

## 2024-11-30 RX ADMIN — ACYCLOVIR 400 MG: 200 CAPSULE ORAL at 20:58

## 2024-11-30 RX ADMIN — Medication 5 MG/HR: at 12:51

## 2024-11-30 RX ADMIN — DILTIAZEM HYDROCHLORIDE 10 MG: 5 INJECTION INTRAVENOUS at 11:23

## 2024-11-30 RX ADMIN — DIGOXIN 250 MCG: 0.25 INJECTION INTRAMUSCULAR; INTRAVENOUS at 14:00

## 2024-11-30 RX ADMIN — APIXABAN 5 MG: 5 TABLET, FILM COATED ORAL at 20:58

## 2024-11-30 RX ADMIN — IOPAMIDOL 69 ML: 755 INJECTION, SOLUTION INTRAVENOUS at 12:00

## 2024-11-30 RX ADMIN — FUROSEMIDE 20 MG: 10 INJECTION, SOLUTION INTRAVENOUS at 13:13

## 2024-11-30 RX ADMIN — ATORVASTATIN CALCIUM 10 MG: 10 TABLET, FILM COATED ORAL at 20:58

## 2024-11-30 RX ADMIN — SODIUM CHLORIDE 1000 MG: 900 INJECTION INTRAVENOUS at 13:22

## 2024-11-30 RX ADMIN — DILTIAZEM HYDROCHLORIDE 240 MG: 120 CAPSULE, EXTENDED RELEASE ORAL at 16:50

## 2024-11-30 NOTE — ED PROVIDER NOTES
Subjective   History of Present Illness  84-year-old female presents to the emergency department with complaints of cough and shortness of breath.  She notes a horrible cough.  She has congestion in her chest.  She has sputum production.  She has been ill since Tuesday.  She denies any fever.  She notes nasal congestion as well.  She does have history of A-fib, and is currently tachycardic on my exam.  She has been using an over-the-counter cough syrup.  She does not feel palpitations.  She usually wears 2 L of nasal cannula oxygen at home.  On my exam she is requiring 5 L.  She has no lower extremity edema.  She has no nausea or acute abdominal complaints.    Review of Systems   Constitutional:  Negative for chills and fever.   Respiratory:  Positive for cough and shortness of breath.    Cardiovascular:  Negative for chest pain and leg swelling.       Past Medical History:   Diagnosis Date    Atrial fibrillation     COPD (chronic obstructive pulmonary disease)     Coronary artery disease     Emphysema of lung     Hypercholesteremia     Hypertension     Smoker        Allergies   Allergen Reactions    Brompheniramine Other (See Comments)    Latex Rash       Past Surgical History:   Procedure Laterality Date    BLADDER SURGERY      HYSTERECTOMY         Family History   Problem Relation Age of Onset    No Known Problems Mother     No Known Problems Father     Diabetes Sister        Social History     Socioeconomic History    Marital status:    Tobacco Use    Smoking status: Former     Current packs/day: 0.00     Average packs/day: 0.5 packs/day for 61.0 years (30.5 ttl pk-yrs)     Types: Cigarettes     Start date: 1956     Quit date: 2017     Years since quittin.9    Smokeless tobacco: Former   Vaping Use    Vaping status: Never Used   Substance and Sexual Activity    Alcohol use: No    Drug use: No    Sexual activity: Defer           Objective   Physical Exam  Vitals reviewed.   Constitutional:        Appearance: She is ill-appearing.   HENT:      Head: Normocephalic and atraumatic.      Nose: Nose normal.   Eyes:      Conjunctiva/sclera: Conjunctivae normal.   Cardiovascular:      Rate and Rhythm: Tachycardia present. Rhythm irregular.      Heart sounds: Normal heart sounds.   Pulmonary:      Effort: Pulmonary effort is normal.      Breath sounds: Normal breath sounds.      Comments: Breath sounds are very diminished on the right.  Rhonchorous on the left.  Abdominal:      General: Bowel sounds are normal.      Palpations: Abdomen is soft.   Musculoskeletal:         General: No tenderness.      Cervical back: Normal range of motion and neck supple.      Right lower leg: No edema.      Left lower leg: No edema.   Skin:     General: Skin is warm and dry.   Neurological:      General: No focal deficit present.      Mental Status: She is alert.      Cranial Nerves: No cranial nerve deficit.   Psychiatric:         Mood and Affect: Mood normal. Mood is not anxious.         Procedures       Lab Results (last 24 hours)       Procedure Component Value Units Date/Time    CBC & Differential [[id number]]  (Abnormal) Collected: 11/30/24 1121    Specimen: Blood Updated: 11/30/24 1130    Narrative:      The following orders were created for panel order CBC & Differential.  Procedure                               Abnormality         Status                     ---------                               -----------         ------                     CBC Auto Differential[[id number]]        Abnormal            Final result                 Please view results for these tests on the individual orders.    Comprehensive Metabolic Panel [[id number]]  (Abnormal) Collected: 11/30/24 1121    Specimen: Blood Updated: 11/30/24 1155     Glucose 116 mg/dL      BUN 15 mg/dL      Creatinine 0.62 mg/dL      Sodium 132 mmol/L      Potassium 4.2 mmol/L      Chloride 94 mmol/L      CO2 29.0 mmol/L      Calcium 8.9 mg/dL      Total Protein 7.5 g/dL       Albumin 4.0 g/dL      ALT (SGPT) 47 U/L      AST (SGOT) 40 U/L      Alkaline Phosphatase 119 U/L      Total Bilirubin 0.5 mg/dL      Globulin 3.5 gm/dL      A/G Ratio 1.1 g/dL      BUN/Creatinine Ratio 24.2     Anion Gap 9.0 mmol/L      eGFR 87.9 mL/min/1.73     Narrative:      GFR Normal >60  Chronic Kidney Disease <60  Kidney Failure <15    The GFR formula is only valid for adults with stable renal function between ages 18 and 70.    BNP [[id number]]  (Abnormal) Collected: 11/30/24 1121    Specimen: Blood Updated: 11/30/24 1153     proBNP 1,820.0 pg/mL     Narrative:      This assay is used as an aid in the diagnosis of individuals suspected of having heart failure. It can be used as an aid in the diagnosis of acute decompensated heart failure (ADHF) in patients presenting with signs and symptoms of ADHF to the emergency department (ED). In addition, NT-proBNP of <300 pg/mL indicates ADHF is not likely.    Age Range Result Interpretation  NT-proBNP Concentration (pg/mL:      <50             Positive            >450                   Gray                 300-450                    Negative             <300    50-75           Positive            >900                  Gray                300-900                  Negative            <300      >75             Positive            >1800                  Gray                300-1800                  Negative            <300    High Sensitivity Troponin T [[id number]]  (Normal) Collected: 11/30/24 1121    Specimen: Blood Updated: 11/30/24 1152     HS Troponin T 6 ng/L     Narrative:      High Sensitive Troponin T Reference Range:  <14.0 ng/L- Negative Female for AMI  <22.0 ng/L- Negative Male for AMI  >=14 - Abnormal Female indicating possible myocardial injury.  >=22 - Abnormal Male indicating possible myocardial injury.   Clinicians would have to utilize clinical acumen, EKG, Troponin, and serial changes to determine if it is an Acute Myocardial Infarction or myocardial  injury due to an underlying chronic condition.         CBC Auto Differential [[id number]]  (Abnormal) Collected: 11/30/24 1121    Specimen: Blood Updated: 11/30/24 1130     WBC 15.63 10*3/mm3      RBC 4.01 10*6/mm3      Hemoglobin 12.3 g/dL      Hematocrit 38.0 %      MCV 94.8 fL      MCH 30.7 pg      MCHC 32.4 g/dL      RDW 12.6 %      RDW-SD 43.8 fl      MPV 8.7 fL      Platelets 277 10*3/mm3      Neutrophil % 71.9 %      Lymphocyte % 15.9 %      Monocyte % 11.3 %      Eosinophil % 0.2 %      Basophil % 0.4 %      Immature Grans % 0.3 %      Neutrophils, Absolute 11.24 10*3/mm3      Lymphocytes, Absolute 2.49 10*3/mm3      Monocytes, Absolute 1.76 10*3/mm3      Eosinophils, Absolute 0.03 10*3/mm3      Basophils, Absolute 0.06 10*3/mm3      Immature Grans, Absolute 0.05 10*3/mm3      nRBC 0.0 /100 WBC     Respiratory Panel PCR w/COVID-19(SARS-CoV-2) CATINA/JOHANNA/RADHA/PAD/COR/ESTEBAN In-House, NP Swab in UTM/VTM, 2 HR TAT - Swab, Nasopharynx [[id number]]  (Normal) Collected: 11/30/24 1122    Specimen: Swab from Nasopharynx Updated: 11/30/24 1257     ADENOVIRUS, PCR Not Detected     Coronavirus 229E Not Detected     Coronavirus HKU1 Not Detected     Coronavirus NL63 Not Detected     Coronavirus OC43 Not Detected     COVID19 Not Detected     Human Metapneumovirus Not Detected     Human Rhinovirus/Enterovirus Not Detected     Influenza A PCR Not Detected     Influenza B PCR Not Detected     Parainfluenza Virus 1 Not Detected     Parainfluenza Virus 2 Not Detected     Parainfluenza Virus 3 Not Detected     Parainfluenza Virus 4 Not Detected     RSV, PCR Not Detected     Bordetella pertussis pcr Not Detected     Bordetella parapertussis PCR Not Detected     Chlamydophila pneumoniae PCR Not Detected     Mycoplasma pneumo by PCR Not Detected    Narrative:      In the setting of a positive respiratory panel with a viral infection PLUS a negative procalcitonin without other underlying concern for bacterial infection, consider observing  "off antibiotics or discontinuation of antibiotics and continue supportive care. If the respiratory panel is positive for atypical bacterial infection (Bordetella pertussis, Chlamydophila pneumoniae, or Mycoplasma pneumoniae), consider antibiotic de-escalation to target atypical bacterial infection.    D-dimer, Quantitative [[id number]]  (Normal) Collected: 11/30/24 1252    Specimen: Blood Updated: 11/30/24 1320     D-Dimer, Quantitative 0.56 MCGFEU/mL     Narrative:      According to the assay 's published package insert, a normal (<0.50 MCGFEU/mL) D-dimer result in conjunction with a non-high clinical probability assessment, excludes deep vein thrombosis (DVT) and pulmonary embolism (PE) with high sensitivity.    D-dimer values increase with age and this can make VTE exclusion of an older population difficult. To address this, the [employer], based on best available evidence and recent guidelines, recommends that clinicians use age-adjusted D-dimer thresholds in patients greater than 50 years of age with: a) a low probability of PE who do not meet all Pulmonary Embolism Rule Out Criteria, or b) in those with intermediate probability of PE.   The formula for an age-adjusted D-dimer cut-off is \"age/100\".  For example, a 60 year old patient would have an age-adjusted cut-off of 0.60 MCGFEU/mL and an 80 year old 0.80 MCGFEU/mL.    Lactic Acid, Plasma [[id number]]  (Normal) Collected: 11/30/24 1252    Specimen: Blood Updated: 11/30/24 1321     Lactate 1.1 mmol/L     High Sensitivity Troponin T 2Hr [[id number]] Collected: 11/30/24 1312    Specimen: Blood Updated: 11/30/24 1355     HS Troponin T <6 ng/L      Troponin T Delta --     Comment: Unable to calculate.       Narrative:      High Sensitive Troponin T Reference Range:  <14.0 ng/L- Negative Female for AMI  <22.0 ng/L- Negative Male for AMI  >=14 - Abnormal Female indicating possible myocardial injury.  >=22 - Abnormal Male indicating " possible myocardial injury.   Clinicians would have to utilize clinical acumen, EKG, Troponin, and serial changes to determine if it is an Acute Myocardial Infarction or myocardial injury due to an underlying chronic condition.               CT Angiogram Chest   Final Result   1. No evidence of pulmonary embolism or acute intrathoracic findings.   2. Mild dilation of the right atrium, measuring dilation of the left   atrium without evidence of congestive heart failure.   3. No aortic aneurysm or dissection. There is short segment stenosis of   the proximal left subclavian artery, with 65% vessel narrowing.   4. Mild chronic lung changes, including findings compatible with   bronchitis, no lung mass or suspicious pulmonary nodule is identified.   No evidence of pneumonia.   5. Probable hepatic steatosis.                    This report was signed and finalized on 11/30/2024 12:34 PM by Dr. Nash West MD.          XR Chest 1 View   Final Result   COPD. Mild cardiomegaly with slight increase in interstitial   opacities, could represent mild edema associated with volume overload.       This report was signed and finalized on 11/30/2024 11:17 AM by Dr. Nash West MD.                ED Course  ED Course as of 11/30/24 1431   Sat Nov 30, 2024   1334 Spoke with the patient.  We reviewed her labs.  Discussed a plan.  She remains tachycardic in the 130s on a Cardizem drip.  Will try a one-time dose of digoxin.  Patient is agreeable now to admission. [AJ]   1424 Asked [facility] to call MD for admission.  [AJ]   1430 I spoke with Dr. Smith.  He is agreeable to admission [AJ]      ED Course User Index  [AJ] Issa Leone,                                                        Medical Decision Making  Differential diagnosis includes: COVID, pneumonia, COPD exacerbation    Amount and/or Complexity of Data Reviewed  Labs: ordered.     Details: CBC CMP lactic respiratory panel  Radiology: ordered.     Details: Chest  x-ray  ECG/medicine tests: ordered.    Risk  Prescription drug management.        Final diagnoses:   Bronchitis   Hypervolemia, unspecified hypervolemia type   Hypoxia   Atrial fibrillation with RVR       ED Disposition  ED Disposition       ED Disposition   Decision to Admit    Condition   --    Comment   Level of Care: Telemetry [5]   Diagnosis: Hypoxia [171319]   Admitting Physician: SWATI VALERIO [[id number]]                 No follow-up provider specified.       Medication List        ASK your doctor about these medications      ipratropium 0.02 % nebulizer solution  Commonly known as: ATROVENT  Take 2.5 mL by nebulization 4 (Four) Times a Day As Needed for Wheezing or Shortness of Air.  Ask about: Which instructions should I use?                 Issa Leone, DO  11/30/24 1119       Issa Leone, DO  11/30/24 1431

## 2024-11-30 NOTE — CONSULTS
Subjective   Merari Gaspar is a 84 y.o. female is being seen for consultation today at the request of Issa Leone DO    Chief Complaint: Cough, congestion    HPI: Ms Gaspar is an 84-year-old female with significant past medical history of permanent atrial fibrillation, hypertension, hypercholesterolemia, emphysema, and arthritis.  She has not felt well over the last 4 days.  She reports productive cough, congestion, chest pressure, and some tachycardia.  Since she did not feel well this morning she did not feel up to taking her medications including diltiazem.  She went to urgent care and then was subsequently seen in the emergency room.  She is found to be in atrial fibrillation with RVR.  She was started on a diltiazem drip and admitted to telemetry.      Patient Active Problem List   Diagnosis    Essential hypertension    Panlobular emphysema    Pure hypercholesterolemia    Primary osteoarthritis involving multiple joints    A-fib    Right ventricular dysfunction    Chronic obstructive pulmonary disease    Scarring of lung    Personal history of tobacco use, presenting hazards to health    Former smoker    Hypoxia       Past Medical History:   Diagnosis Date    Atrial fibrillation     COPD (chronic obstructive pulmonary disease)     Coronary artery disease     Emphysema of lung     Hypercholesteremia     Hypertension     Smoker      Past Surgical History:   Procedure Laterality Date    BLADDER SURGERY      HYSTERECTOMY         The following portions of the patient's history were reviewed and updated as appropriate: allergies, current medications, past family history, past medical history, past social history, past surgical history, and problem list.    Social History     Socioeconomic History    Marital status:    Tobacco Use    Smoking status: Former     Current packs/day: 0.00     Average packs/day: 0.5 packs/day for 61.0 years (30.5 ttl pk-yrs)     Types: Cigarettes     Start date: 1/1/1956      "Quit date: 2017     Years since quittin.9    Smokeless tobacco: Former   Vaping Use    Vaping status: Never Used   Substance and Sexual Activity    Alcohol use: No    Drug use: No    Sexual activity: Defer       Family History   Problem Relation Age of Onset    No Known Problems Mother     No Known Problems Father     Diabetes Sister        Review of Systems: A 12 system review of systems was completed and is negative except stated in HPI.     Objective   /47 (BP Location: Left arm, Patient Position: Sitting)   Pulse 117   Temp 98.8 °F (37.1 °C) (Oral)   Resp 18   Ht 162.6 cm (64\")   Wt 65.8 kg (145 lb)   SpO2 92%   BMI 24.89 kg/m²     Physical Exam:  Constitutional:       Appearance: Well-developed.   HENT:      Head: Normocephalic and atraumatic.   Pulmonary:      Effort: Pulmonary effort is normal.      Breath sounds: Normal breath sounds.   Cardiovascular:      Tachycardia present. Irregularly irregular rhythm.   Edema:     Peripheral edema absent.   Skin:     General: Skin is warm and dry.   Neurological:      Mental Status: Alert and oriented to person, place, and time.         Lab Results   Component Value Date    TROPONINI <0.012 2018    TROPONINT <6 2024     Lab Results   Component Value Date    GLUCOSE 116 (H) 2024    CALCIUM 8.9 2024     (L) 2024    K 4.2 2024    CO2 29.0 2024    CL 94 (L) 2024    BUN 15 2024    CREATININE 0.62 2024    EGFRIFNONA 99 2018    BCR 24.2 2024    ANIONGAP 9.0 2024     Lab Results   Component Value Date    WBC 15.63 (H) 2024    HGB 12.3 2024    HCT 38.0 2024    MCV 94.8 2024     2024     Lab Results   Component Value Date    CHOL 149 2018     Lab Results   Component Value Date    TRIG 84 2018     Lab Results   Component Value Date    HDL 64 2018     No components found for: \"LDLCALC\"  Lab Results   Component Value Date    " LDL 72 05/17/2018     -Echo (8/23/2023) EF 61-65%, mild AI, RVSP 45-55 mmHg, borderline RV dilation with mildly reduced RV systolic function, biatrial enlargement      Assessment:  1.  Acute upper respiratory infection  2.  Permanent atrial fibrillation: Tachycardic secondary to URI and missing morning dose of diltiazem.  3.  Leukocytosis: Likely secondary to URI.  4.  Essential hypertension  5.  Hypercholesterolemia  6.  Chronic anticoagulation    Plan:  -Restart oral diltiazem and wean diltiazem drip as tolerated.  -Continue Eliquis  -Management of URI per primary   Phosphorus improving. Continue with sensipar 30 mg daily and phoslo 3 tablets with meals. c/w hectorol 2 mcg with HD. Monitor serum calcium and phosphorus.

## 2024-12-01 PROBLEM — J44.1 COPD WITH ACUTE EXACERBATION: Status: ACTIVE | Noted: 2018-11-23

## 2024-12-01 LAB
ALBUMIN SERPL-MCNC: 3.9 G/DL (ref 3.5–5.2)
ALBUMIN/GLOB SERPL: 1.1 G/DL
ALP SERPL-CCNC: 125 U/L (ref 39–117)
ALT SERPL W P-5'-P-CCNC: 48 U/L (ref 1–33)
ANION GAP SERPL CALCULATED.3IONS-SCNC: 12 MMOL/L (ref 5–15)
AST SERPL-CCNC: 33 U/L (ref 1–32)
BACTERIA UR QL AUTO: ABNORMAL /HPF
BASOPHILS # BLD AUTO: 0.08 10*3/MM3 (ref 0–0.2)
BASOPHILS NFR BLD AUTO: 0.5 % (ref 0–1.5)
BILIRUB SERPL-MCNC: 0.5 MG/DL (ref 0–1.2)
BILIRUB UR QL STRIP: ABNORMAL
BUN SERPL-MCNC: 17 MG/DL (ref 8–23)
BUN/CREAT SERPL: 27.4 (ref 7–25)
CALCIUM SPEC-SCNC: 8.8 MG/DL (ref 8.6–10.5)
CHLORIDE SERPL-SCNC: 93 MMOL/L (ref 98–107)
CLARITY UR: ABNORMAL
CO2 SERPL-SCNC: 31 MMOL/L (ref 22–29)
COLOR UR: ABNORMAL
CREAT SERPL-MCNC: 0.62 MG/DL (ref 0.57–1)
DEPRECATED RDW RBC AUTO: 43.5 FL (ref 37–54)
EGFRCR SERPLBLD CKD-EPI 2021: 87.9 ML/MIN/1.73
EOSINOPHIL # BLD AUTO: 0.02 10*3/MM3 (ref 0–0.4)
EOSINOPHIL NFR BLD AUTO: 0.1 % (ref 0.3–6.2)
ERYTHROCYTE [DISTWIDTH] IN BLOOD BY AUTOMATED COUNT: 12.4 % (ref 12.3–15.4)
GLOBULIN UR ELPH-MCNC: 3.5 GM/DL
GLUCOSE SERPL-MCNC: 106 MG/DL (ref 65–99)
GLUCOSE UR STRIP-MCNC: NEGATIVE MG/DL
HCT VFR BLD AUTO: 39.3 % (ref 34–46.6)
HGB BLD-MCNC: 12.5 G/DL (ref 12–15.9)
HGB UR QL STRIP.AUTO: ABNORMAL
IMM GRANULOCYTES # BLD AUTO: 0.1 10*3/MM3 (ref 0–0.05)
IMM GRANULOCYTES NFR BLD AUTO: 0.6 % (ref 0–0.5)
KETONES UR QL STRIP: ABNORMAL
LEUKOCYTE ESTERASE UR QL STRIP.AUTO: ABNORMAL
LYMPHOCYTES # BLD AUTO: 1.38 10*3/MM3 (ref 0.7–3.1)
LYMPHOCYTES NFR BLD AUTO: 8.3 % (ref 19.6–45.3)
MAGNESIUM SERPL-MCNC: 2 MG/DL (ref 1.6–2.4)
MCH RBC QN AUTO: 30.2 PG (ref 26.6–33)
MCHC RBC AUTO-ENTMCNC: 31.8 G/DL (ref 31.5–35.7)
MCV RBC AUTO: 94.9 FL (ref 79–97)
MONOCYTES # BLD AUTO: 1.67 10*3/MM3 (ref 0.1–0.9)
MONOCYTES NFR BLD AUTO: 10.1 % (ref 5–12)
NEUTROPHILS NFR BLD AUTO: 13.33 10*3/MM3 (ref 1.7–7)
NEUTROPHILS NFR BLD AUTO: 80.4 % (ref 42.7–76)
NITRITE UR QL STRIP: NEGATIVE
NRBC BLD AUTO-RTO: 0 /100 WBC (ref 0–0.2)
PH UR STRIP.AUTO: 5.5 [PH] (ref 5–8)
PLATELET # BLD AUTO: 297 10*3/MM3 (ref 140–450)
PMV BLD AUTO: 8.8 FL (ref 6–12)
POTASSIUM SERPL-SCNC: 4.1 MMOL/L (ref 3.5–5.2)
PROT SERPL-MCNC: 7.4 G/DL (ref 6–8.5)
PROT UR QL STRIP: ABNORMAL
QT INTERVAL: 272 MS
QTC INTERVAL: 393 MS
RBC # BLD AUTO: 4.14 10*6/MM3 (ref 3.77–5.28)
RBC # UR STRIP: ABNORMAL /HPF
REF LAB TEST METHOD: ABNORMAL
SODIUM SERPL-SCNC: 136 MMOL/L (ref 136–145)
SP GR UR STRIP: 1.02 (ref 1–1.03)
SQUAMOUS #/AREA URNS HPF: ABNORMAL /HPF
UROBILINOGEN UR QL STRIP: ABNORMAL
WBC # UR STRIP: ABNORMAL /HPF
WBC NRBC COR # BLD AUTO: 16.58 10*3/MM3 (ref 3.4–10.8)

## 2024-12-01 PROCEDURE — 25010000002 CEFTRIAXONE PER 250 MG

## 2024-12-01 PROCEDURE — 94799 UNLISTED PULMONARY SVC/PX: CPT

## 2024-12-01 PROCEDURE — 81001 URINALYSIS AUTO W/SCOPE: CPT

## 2024-12-01 PROCEDURE — 25010000002 METHYLPREDNISOLONE PER 125 MG

## 2024-12-01 PROCEDURE — 96375 TX/PRO/DX INJ NEW DRUG ADDON: CPT

## 2024-12-01 PROCEDURE — G0378 HOSPITAL OBSERVATION PER HR: HCPCS

## 2024-12-01 PROCEDURE — 83735 ASSAY OF MAGNESIUM: CPT | Performed by: INTERNAL MEDICINE

## 2024-12-01 PROCEDURE — 80053 COMPREHEN METABOLIC PANEL: CPT | Performed by: INTERNAL MEDICINE

## 2024-12-01 PROCEDURE — 94664 DEMO&/EVAL PT USE INHALER: CPT

## 2024-12-01 PROCEDURE — 25010000002 METHYLPREDNISOLONE PER 40 MG: Performed by: INTERNAL MEDICINE

## 2024-12-01 PROCEDURE — 94640 AIRWAY INHALATION TREATMENT: CPT

## 2024-12-01 PROCEDURE — 87086 URINE CULTURE/COLONY COUNT: CPT

## 2024-12-01 PROCEDURE — 96376 TX/PRO/DX INJ SAME DRUG ADON: CPT

## 2024-12-01 PROCEDURE — 87040 BLOOD CULTURE FOR BACTERIA: CPT

## 2024-12-01 PROCEDURE — 99214 OFFICE O/P EST MOD 30 MIN: CPT | Performed by: INTERNAL MEDICINE

## 2024-12-01 PROCEDURE — 85025 COMPLETE CBC W/AUTO DIFF WBC: CPT | Performed by: INTERNAL MEDICINE

## 2024-12-01 PROCEDURE — 96366 THER/PROPH/DIAG IV INF ADDON: CPT

## 2024-12-01 RX ORDER — IPRATROPIUM BROMIDE AND ALBUTEROL SULFATE 2.5; .5 MG/3ML; MG/3ML
3 SOLUTION RESPIRATORY (INHALATION)
Status: DISCONTINUED | OUTPATIENT
Start: 2024-12-01 | End: 2024-12-01

## 2024-12-01 RX ORDER — METHYLPREDNISOLONE SODIUM SUCCINATE 40 MG/ML
40 INJECTION, POWDER, LYOPHILIZED, FOR SOLUTION INTRAMUSCULAR; INTRAVENOUS EVERY 12 HOURS
Status: DISCONTINUED | OUTPATIENT
Start: 2024-12-01 | End: 2024-12-02

## 2024-12-01 RX ORDER — METHYLPREDNISOLONE SODIUM SUCCINATE 125 MG/2ML
125 INJECTION, POWDER, LYOPHILIZED, FOR SOLUTION INTRAMUSCULAR; INTRAVENOUS ONCE
Status: COMPLETED | OUTPATIENT
Start: 2024-12-01 | End: 2024-12-01

## 2024-12-01 RX ADMIN — IPRATROPIUM BROMIDE AND ALBUTEROL SULFATE 3 ML: .5; 3 SOLUTION RESPIRATORY (INHALATION) at 10:51

## 2024-12-01 RX ADMIN — Medication 10 ML: at 20:34

## 2024-12-01 RX ADMIN — IPRATROPIUM BROMIDE 0.5 MG: 0.5 SOLUTION RESPIRATORY (INHALATION) at 20:04

## 2024-12-01 RX ADMIN — IPRATROPIUM BROMIDE 0.5 MG: 0.5 SOLUTION RESPIRATORY (INHALATION) at 14:38

## 2024-12-01 RX ADMIN — ACYCLOVIR 400 MG: 200 CAPSULE ORAL at 07:32

## 2024-12-01 RX ADMIN — DILTIAZEM HYDROCHLORIDE 240 MG: 120 CAPSULE, EXTENDED RELEASE ORAL at 07:34

## 2024-12-01 RX ADMIN — APIXABAN 5 MG: 5 TABLET, FILM COATED ORAL at 07:32

## 2024-12-01 RX ADMIN — Medication 7.5 MG/HR: at 04:38

## 2024-12-01 RX ADMIN — ACYCLOVIR 400 MG: 200 CAPSULE ORAL at 20:33

## 2024-12-01 RX ADMIN — APIXABAN 5 MG: 5 TABLET, FILM COATED ORAL at 20:33

## 2024-12-01 RX ADMIN — METHYLPREDNISOLONE SODIUM SUCCINATE 40 MG: 40 INJECTION, POWDER, FOR SOLUTION INTRAMUSCULAR; INTRAVENOUS at 23:16

## 2024-12-01 RX ADMIN — METHYLPREDNISOLONE SODIUM SUCCINATE 125 MG: 125 INJECTION, POWDER, FOR SOLUTION INTRAMUSCULAR; INTRAVENOUS at 10:49

## 2024-12-01 RX ADMIN — SODIUM CHLORIDE 1000 MG: 900 INJECTION INTRAVENOUS at 11:33

## 2024-12-01 NOTE — H&P
Date of Admission: 11/30/2024  Primary Care Physician: Nash Javier MD    Subjective     Chief Complaint: Shortness of breath, tachycardia    Cough  Associated symptoms include shortness of breath. Pertinent negatives include no chest pain or fever.   Shortness of Breath  Pertinent negatives include no chest pain or fever.     Mrs. Gaspar is an 84-year-old patient of Dr. Javier with a past medical history of atrial fibrillation, COPD, CAD, hypertension, hypercholesterolemia.  She was admitted yesterday with complaints of cough and shortness of breath.  She had some chest congestion and increased pedal production.  She had been ill since Tuesday of last week, denied any fever.  She has found to be A-fib RVR in the emergency department started on diltiazem drip.  Apparently she had missed a dose of her diltiazem at home.  She was evaluated by cardiology, and is currently off of diltiazem drip and rate has been more controlled.  Anticoagulated with Eliquis.  Still complaining of some cough and intermittent shortness of breath, however overall improved.  Currently oxygenating in the low 90s on 3 L nasal cannula.  She has some persistent leukocytosis with left shift, negative lactic acid.  CT angiogram of the chest was performed in the emergency department showing no evidence of pulmonary embolus, no pneumonia was identified.  Respiratory panel was negative as well.  She received 1 dose of Rocephin in the emergency department and digoxin.  She does have some wheezing on exam.      Review of Systems   Constitutional:  Positive for fatigue. Negative for fever.   Respiratory:  Positive for cough and shortness of breath.    Cardiovascular:  Negative for chest pain.   Neurological:  Positive for weakness.        Otherwise complete ROS reviewed and negative except as mentioned in the HPI.      Past Medical History:   Past Medical History:   Diagnosis Date    Atrial fibrillation     COPD (chronic obstructive  pulmonary disease)     Coronary artery disease     Emphysema of lung     Hypercholesteremia     Hypertension     Smoker        Past Surgical History:  Past Surgical History:   Procedure Laterality Date    BLADDER SURGERY      HYSTERECTOMY         Social History:  reports that she quit smoking about 7 years ago. Her smoking use included cigarettes. She started smoking about 68 years ago. She has a 30.5 pack-year smoking history. She has quit using smokeless tobacco. She reports that she does not drink alcohol and does not use drugs.    Family History: family history includes Diabetes in her sister; No Known Problems in her father and mother.       Allergies:  Allergies   Allergen Reactions    Brompheniramine Other (See Comments)    Latex Rash       Medications:  Prior to Admission medications    Medication Sig Start Date End Date Taking? Authorizing Provider   acyclovir (ZOVIRAX) 400 MG tablet Take 1 tablet by mouth 2 (Two) Times a Day.   Yes Gregoria Rodriguez MD   apixaban (ELIQUIS) 5 MG tablet tablet Take 1 tablet by mouth Every 12 (Twelve) Hours. 5/18/18  Yes Nash Javier MD   coenzyme Q10 100 MG capsule Take 1 capsule by mouth Every Night.   Yes Gregoria Rodriguez MD   dilTIAZem CD (CARDIZEM CD) 240 MG 24 hr capsule Take 1 capsule by mouth once daily 4/29/24  Yes Concha Dill APRN   lovastatin (MEVACOR) 20 MG tablet Take 1 tablet by mouth Every Other Day. Alternate half-tablet (10mg) dose every other day.   Yes Gregoria Rodriguez MD   Multiple Vitamins-Minerals (MULTIVITAL PO) Take 1 tablet by mouth Daily.   Yes Gregoria Rodriguez MD   O2 (OXYGEN) Inhale 2 L 1 (One) Time.   Yes Gregoria Rodriguez MD   diazePAM (VALIUM) 2 MG tablet Take 1 tablet by mouth 2 (Two) Times a Day As Needed. 10/24/24   Gregoria Rodriguez MD   furosemide (LASIX) 20 MG tablet Take 1 tablet by mouth Daily As Needed. 10/3/19   Gregoria Rodriguez MD   ipratropium (ATROVENT) 0.02 % nebulizer solution Take 2.5  "mL by nebulization 4 (Four) Times a Day As Needed for Wheezing or Shortness of Air. 2/26/24   Jostin Mcqueen APRN       Objective     Vital Signs: /57 (BP Location: Right arm, Patient Position: Sitting)   Pulse 101   Temp 99.2 °F (37.3 °C) (Oral)   Resp 20   Ht 162.6 cm (64\")   Wt 65.3 kg (144 lb)   SpO2 94%   BMI 24.72 kg/m²   Physical Exam  Constitutional:       Appearance: She is normal weight.   HENT:      Head: Normocephalic and atraumatic.      Mouth/Throat:      Mouth: Mucous membranes are moist.      Pharynx: Oropharynx is clear.   Eyes:      Extraocular Movements: Extraocular movements intact.      Pupils: Pupils are equal, round, and reactive to light.   Cardiovascular:      Rate and Rhythm: Tachycardia present. Rhythm irregular.      Pulses: Normal pulses.      Heart sounds: Normal heart sounds.   Pulmonary:      Effort: Pulmonary effort is normal.      Breath sounds: Wheezing present.   Musculoskeletal:         General: Normal range of motion.   Skin:     General: Skin is warm and dry.      Capillary Refill: Capillary refill takes less than 2 seconds.   Neurological:      General: No focal deficit present.      Mental Status: She is alert and oriented to person, place, and time. Mental status is at baseline.             Results Reviewed:  Lab Results (last 24 hours)       Procedure Component Value Units Date/Time    Comprehensive Metabolic Panel [[id number]]  (Abnormal) Collected: 12/01/24 0517    Specimen: Blood Updated: 12/01/24 0555     Glucose 106 mg/dL      BUN 17 mg/dL      Creatinine 0.62 mg/dL      Sodium 136 mmol/L      Potassium 4.1 mmol/L      Chloride 93 mmol/L      CO2 31.0 mmol/L      Calcium 8.8 mg/dL      Total Protein 7.4 g/dL      Albumin 3.9 g/dL      ALT (SGPT) 48 U/L      AST (SGOT) 33 U/L      Alkaline Phosphatase 125 U/L      Total Bilirubin 0.5 mg/dL      Globulin 3.5 gm/dL      A/G Ratio 1.1 g/dL      BUN/Creatinine Ratio 27.4     Anion Gap 12.0 mmol/L      " eGFR 87.9 mL/min/1.73     Narrative:      GFR Normal >60  Chronic Kidney Disease <60  Kidney Failure <15    The GFR formula is only valid for adults with stable renal function between ages 18 and 70.    Magnesium [[id number]]  (Normal) Collected: 12/01/24 0517    Specimen: Blood Updated: 12/01/24 0555     Magnesium 2.0 mg/dL     CBC Auto Differential [[id number]]  (Abnormal) Collected: 12/01/24 0517    Specimen: Blood Updated: 12/01/24 0547     WBC 16.58 10*3/mm3      RBC 4.14 10*6/mm3      Hemoglobin 12.5 g/dL      Hematocrit 39.3 %      MCV 94.9 fL      MCH 30.2 pg      MCHC 31.8 g/dL      RDW 12.4 %      RDW-SD 43.5 fl      MPV 8.8 fL      Platelets 297 10*3/mm3      Neutrophil % 80.4 %      Lymphocyte % 8.3 %      Monocyte % 10.1 %      Eosinophil % 0.1 %      Basophil % 0.5 %      Immature Grans % 0.6 %      Neutrophils, Absolute 13.33 10*3/mm3      Lymphocytes, Absolute 1.38 10*3/mm3      Monocytes, Absolute 1.67 10*3/mm3      Eosinophils, Absolute 0.02 10*3/mm3      Basophils, Absolute 0.08 10*3/mm3      Immature Grans, Absolute 0.10 10*3/mm3      nRBC 0.0 /100 WBC     High Sensitivity Troponin T 2Hr [[id number]] Collected: 11/30/24 1312    Specimen: Blood Updated: 11/30/24 1355     HS Troponin T <6 ng/L      Troponin T Delta --     Comment: Unable to calculate.       Narrative:      High Sensitive Troponin T Reference Range:  <14.0 ng/L- Negative Female for AMI  <22.0 ng/L- Negative Male for AMI  >=14 - Abnormal Female indicating possible myocardial injury.  >=22 - Abnormal Male indicating possible myocardial injury.   Clinicians would have to utilize clinical acumen, EKG, Troponin, and serial changes to determine if it is an Acute Myocardial Infarction or myocardial injury due to an underlying chronic condition.         Lactic Acid, Plasma [[id number]]  (Normal) Collected: 11/30/24 1252    Specimen: Blood Updated: 11/30/24 1321     Lactate 1.1 mmol/L     D-dimer, Quantitative [[id number]]  (Normal) Collected:  "11/30/24 1252    Specimen: Blood Updated: 11/30/24 1320     D-Dimer, Quantitative 0.56 MCGFEU/mL     Narrative:      According to the assay 's published package insert, a normal (<0.50 MCGFEU/mL) D-dimer result in conjunction with a non-high clinical probability assessment, excludes deep vein thrombosis (DVT) and pulmonary embolism (PE) with high sensitivity.    D-dimer values increase with age and this can make VTE exclusion of an older population difficult. To address this, the [employer], based on best available evidence and recent guidelines, recommends that clinicians use age-adjusted D-dimer thresholds in patients greater than 50 years of age with: a) a low probability of PE who do not meet all Pulmonary Embolism Rule Out Criteria, or b) in those with intermediate probability of PE.   The formula for an age-adjusted D-dimer cut-off is \"age/100\".  For example, a 60 year old patient would have an age-adjusted cut-off of 0.60 MCGFEU/mL and an 80 year old 0.80 MCGFEU/mL.    Respiratory Panel PCR w/COVID-19(SARS-CoV-2) [facility]ANNA/RADHA/[facility] In-House, NP Swab in UTM/VTM, 2 HR TAT - Swab, Nasopharynx [[id number]]  (Normal) Collected: 11/30/24 1122    Specimen: Swab from Nasopharynx Updated: 11/30/24 1257     ADENOVIRUS, PCR Not Detected     Coronavirus 229E Not Detected     Coronavirus HKU1 Not Detected     Coronavirus NL63 Not Detected     Coronavirus OC43 Not Detected     COVID19 Not Detected     Human Metapneumovirus Not Detected     Human Rhinovirus/Enterovirus Not Detected     Influenza A PCR Not Detected     Influenza B PCR Not Detected     Parainfluenza Virus 1 Not Detected     Parainfluenza Virus 2 Not Detected     Parainfluenza Virus 3 Not Detected     Parainfluenza Virus 4 Not Detected     RSV, PCR Not Detected     Bordetella pertussis pcr Not Detected     Bordetella parapertussis PCR Not Detected     Chlamydophila pneumoniae PCR Not Detected     Mycoplasma pneumo by PCR Not " Detected    Narrative:      In the setting of a positive respiratory panel with a viral infection PLUS a negative procalcitonin without other underlying concern for bacterial infection, consider observing off antibiotics or discontinuation of antibiotics and continue supportive care. If the respiratory panel is positive for atypical bacterial infection (Bordetella pertussis, Chlamydophila pneumoniae, or Mycoplasma pneumoniae), consider antibiotic de-escalation to target atypical bacterial infection.    Comprehensive Metabolic Panel [[id number]]  (Abnormal) Collected: 11/30/24 1121    Specimen: Blood Updated: 11/30/24 1155     Glucose 116 mg/dL      BUN 15 mg/dL      Creatinine 0.62 mg/dL      Sodium 132 mmol/L      Potassium 4.2 mmol/L      Chloride 94 mmol/L      CO2 29.0 mmol/L      Calcium 8.9 mg/dL      Total Protein 7.5 g/dL      Albumin 4.0 g/dL      ALT (SGPT) 47 U/L      AST (SGOT) 40 U/L      Alkaline Phosphatase 119 U/L      Total Bilirubin 0.5 mg/dL      Globulin 3.5 gm/dL      A/G Ratio 1.1 g/dL      BUN/Creatinine Ratio 24.2     Anion Gap 9.0 mmol/L      eGFR 87.9 mL/min/1.73     Narrative:      GFR Normal >60  Chronic Kidney Disease <60  Kidney Failure <15    The GFR formula is only valid for adults with stable renal function between ages 18 and 70.    BNP [[id number]]  (Abnormal) Collected: 11/30/24 1121    Specimen: Blood Updated: 11/30/24 1153     proBNP 1,820.0 pg/mL     Narrative:      This assay is used as an aid in the diagnosis of individuals suspected of having heart failure. It can be used as an aid in the diagnosis of acute decompensated heart failure (ADHF) in patients presenting with signs and symptoms of ADHF to the emergency department (ED). In addition, NT-proBNP of <300 pg/mL indicates ADHF is not likely.    Age Range Result Interpretation  NT-proBNP Concentration (pg/mL:      <50             Positive            >450                   Gray                 300-450                     Negative             <300    50-75           Positive            >900                  Gray                300-900                  Negative            <300      >75             Positive            >1800                  Gray                300-1800                  Negative            <300    High Sensitivity Troponin T [[id number]]  (Normal) Collected: 11/30/24 1121    Specimen: Blood Updated: 11/30/24 1152     HS Troponin T 6 ng/L     Narrative:      High Sensitive Troponin T Reference Range:  <14.0 ng/L- Negative Female for AMI  <22.0 ng/L- Negative Male for AMI  >=14 - Abnormal Female indicating possible myocardial injury.  >=22 - Abnormal Male indicating possible myocardial injury.   Clinicians would have to utilize clinical acumen, EKG, Troponin, and serial changes to determine if it is an Acute Myocardial Infarction or myocardial injury due to an underlying chronic condition.         CBC & Differential [[id number]]  (Abnormal) Collected: 11/30/24 1121    Specimen: Blood Updated: 11/30/24 1130    Narrative:      The following orders were created for panel order CBC & Differential.  Procedure                               Abnormality         Status                     ---------                               -----------         ------                     CBC Auto Differential[[id number]]        Abnormal            Final result                 Please view results for these tests on the individual orders.    CBC Auto Differential [[id number]]  (Abnormal) Collected: 11/30/24 1121    Specimen: Blood Updated: 11/30/24 1130     WBC 15.63 10*3/mm3      RBC 4.01 10*6/mm3      Hemoglobin 12.3 g/dL      Hematocrit 38.0 %      MCV 94.8 fL      MCH 30.7 pg      MCHC 32.4 g/dL      RDW 12.6 %      RDW-SD 43.8 fl      MPV 8.7 fL      Platelets 277 10*3/mm3      Neutrophil % 71.9 %      Lymphocyte % 15.9 %      Monocyte % 11.3 %      Eosinophil % 0.2 %      Basophil % 0.4 %      Immature Grans % 0.3 %      Neutrophils, Absolute  11.24 10*3/mm3      Lymphocytes, Absolute 2.49 10*3/mm3      Monocytes, Absolute 1.76 10*3/mm3      Eosinophils, Absolute 0.03 10*3/mm3      Basophils, Absolute 0.06 10*3/mm3      Immature Grans, Absolute 0.05 10*3/mm3      nRBC 0.0 /100 WBC           Imaging Results (Last 24 Hours)       Procedure Component Value Units Date/Time    CT Angiogram Chest [[id number]] Collected: 11/30/24 1225     Updated: 11/30/24 1238    Narrative:      EXAMINATION: CT ANGIOGRAM CHEST- 11/30/2024 12:25 PM     HISTORY: afib rvr. SOA     TOTAL DOSE: 165.36 mGy.cm (Automatic exposure control technique was  implemented in an effort to keep the radiation dose as low as possible  without compromising image quality)     REPORT:  Spiral CT of the chest was performed after administration of intravenous  contrast from the thoracic inlet through the upper abdomen using CTA  protocol, which includes reconstructed maximum intensity projection  (MIP)coronal and sagittal images.     Comparison: CT chest without contrast 5/28/2019..     The contrast bolus is satisfactory, there is mild respiratory and  cardiac motion artifact. The central pulmonary arteries are normal  caliber and no filling defects are seen within the pulmonary arteries.  Heart size is normal, the RV LV ratio is less than 1, normal. There is  mild dilation of the right atrium and to a lesser degree the left  atrium. The ascending aorta is ectatic but no aneurysm or evidence of  aortic dissection is identified. Mixed calcified and noncalcified plaque  is noted at the aortic arch. There is focal short segment stenosis of  the proximal left subclavian artery with a patent luminal diameter of  2.7 mm, this appears to correspond with approximately 65% stenosis. The  carotid arteries and proximal vertebral arteries are patent. No  intrathoracic lymphadenopathy is identified. Small volume of calcified  plaque is noted within the coronary arteries. There is no pleural  effusion. Review of  lung windows demonstrates biapical pleural  thickening and fibrosis, greater on the right, similar to the previous  CT. Diffuse emphysema is present. There is patchy infiltrate versus  atelectasis within the anterior inferior lingula. There is also mild  dependent atelectasis in the lower lobes greater on the left. Mild  bronchial wall thickening is present diffusely, compatible with  bronchitis. No pneumothorax is identified. In the right lower lobe image  90 series 7 there is a small pulmonary nodule that measures 4 mm, this  appears at least partially calcified and is similar in size. The  visualized upper abdomen demonstrates decreased attenuation of the liver  compatible with steatosis. Review of bone windows shows no acute osseous  abnormality.       Impression:      1. No evidence of pulmonary embolism or acute intrathoracic findings.  2. Mild dilation of the right atrium, measuring dilation of the left  atrium without evidence of congestive heart failure.  3. No aortic aneurysm or dissection. There is short segment stenosis of  the proximal left subclavian artery, with 65% vessel narrowing.  4. Mild chronic lung changes, including findings compatible with  bronchitis, no lung mass or suspicious pulmonary nodule is identified.  No evidence of pneumonia.  5. Probable hepatic steatosis.               This report was signed and finalized on 11/30/2024 12:34 PM by Dr. Nash West MD.       XR Chest 1 View [[id number]] Collected: 11/30/24 1115     Updated: 11/30/24 1120    Narrative:      EXAMINATION: XR CHEST 1 VW- 11/30/2024 11:15 AM     HISTORY: SOA.     REPORT: A frontal view of the chest was obtained.     COMPARISON: Chest x-rays 5/20/2018. The lungs are hyperinflated  compatible with diffuse emphysema. Interstitial markings appear somewhat  coarse, this is more apparent, which may be related to technique, less  likely interstitial pneumonitis. Mild cardiomegaly is present. Mild  interstitial edema  involving overload is not excluded no pneumothorax or  pleural effusion is identified. Osseous structures are unremarkable.       Impression:      COPD. Mild cardiomegaly with slight increase in interstitial  opacities, could represent mild edema associated with volume overload.     This report was signed and finalized on 11/30/2024 11:17 AM by Dr. Nash West MD.               I have personally reviewed and interpreted the radiology studies and ECG obtained at time of admission.     Assessment / Plan     Assessment & Plan  Active Hospital Problems    Diagnosis     **Hypoxia    COPD with acute exacerbation  Panlobar emphysema  Atrial fibrillation with RVR    1.  Heart rate is more controlled on oral diltiazem.  Not requiring drip currently.  Cardiology following.  She had 1 dose of digoxin yesterday in the ER.  Anticoagulated with Eliquis.  Mildly elevated BNP.  Had 1 dose of IV Lasix yesterday in ER.  Appears euvolemic this morning.  Management of CHF per cardiology.  Renal function stable.  2.  Exam is more consistent with COPD exacerbation.  She has wheezing on exam.  Order steroids and nebulizer treatments.  No evidence of pneumonia on CT scan.  Respiratory panel is negative.  Continue with supplemental oxygen.  3.  Persistent leukocytosis with left shift.  She received 1 dose of IV Rocephin in the ER yesterday.  Will continue with JOSE and order blood cultures with ROSEANN.  Lactic was normal.  4.  Patient states she has had some cloudy urine.  Could be explanation of leukocytosis.  Urinalysis has been ordered.    Further orders per Dr. Smith.      Donell Bach, APRN   12/01/24   09:40 CST

## 2024-12-01 NOTE — PLAN OF CARE
Goal Outcome Evaluation:              Outcome Evaluation: alert and oriented and able to make needs known. VSS. AF . continues on cardiazem gtt at this time. remains on NPO diet since midnight.  at bedside. call light within reach. safety maintained.

## 2024-12-01 NOTE — PROGRESS NOTES
[facility] -  Progress Note     LOS: 0 days   Patient Care Team:  Nash Javier MD as PCP - General (Family Medicine)  Sohail Strong MD as Consulting Physician (Cardiology)  Jostin Mcqueen APRN as Nurse Practitioner (Family Medicine)  [facility] (DME Services)  Concha Dill APRN as Nurse Practitioner (Family Medicine)    Chief Complaint: cough. Congestion     Subjective     Interval History:     Patient Complaints: Patient is sitting on side of bed with family at bedside. She is wearing O2 via NC at 2-3 L during the time of my examination. She reports that she sleeps with oxygen on at home. She reports that she is feeling good this am. She reports some coughing and congestion still. She denies any chest pain. She denies any leg swelling, orthopnea or PND. She denies any heart racing or palpitations. Cardizem drip was turned off this am, Telemetry shows that heart rates have been  today.     Review of Systems:     Review of Systems   Respiratory:  Positive for cough.    Cardiovascular:  Negative for chest pain, palpitations and leg swelling.     Objective     Vital Sign Min/Max for last 24 hours  Temp  Min: 98.6 °F (37 °C)  Max: 99.2 °F (37.3 °C)   BP  Min: 90/65  Max: 139/62   Pulse  Min: 88  Max: 152   Resp  Min: 18  Max: 20   SpO2  Min: 86 %  Max: 100 %   No data recorded   Weight  Min: 65.3 kg (144 lb)  Max: 65.8 kg (145 lb)         12/01/24  0421   Weight: 65.3 kg (144 lb)       Intake/Output Summary (Last 24 hours) at 12/1/2024 1131  Last data filed at 12/1/2024 0300  Gross per 24 hour   Intake --   Output 550 ml   Net -550 ml     Telemetry: Atrial fibrillation  currently       Physical Exam:    Vitals reviewed.   Constitutional:       General: Not in acute distress.     Appearance: Normal appearance. Well-developed.      Interventions: Nasal cannula in place.   Eyes:      Pupils: Pupils are equal, round, and reactive to light.   HENT:      Head:  Normocephalic and atraumatic.      Nose: Nose normal.   Neck:      Vascular: No carotid bruit.   Pulmonary:      Effort: Pulmonary effort is normal. No respiratory distress.      Breath sounds: Normal breath sounds. No wheezing. No rales.   Cardiovascular:      Normal rate. Irregularly irregular rhythm.      Murmurs: There is no murmur.   Edema:     Peripheral edema absent.   Abdominal:      General: There is no distension.      Palpations: Abdomen is soft.   Musculoskeletal: Normal range of motion.      Cervical back: Normal range of motion and neck supple. Skin:     General: Skin is warm.      Findings: No erythema or rash.   Neurological:      General: No focal deficit present.      Mental Status: Alert and oriented to person, place, and time.   Psychiatric:         Attention and Perception: Attention normal.         Mood and Affect: Mood normal.         Speech: Speech normal.         Behavior: Behavior normal.         Thought Content: Thought content normal.         Judgment: Judgment normal.       Results Review:   Lab Results (last 72 hours)       Procedure Component Value Units Date/Time    Blood Culture With ROSEANN - Blood, Arm, Left [[id number]] Collected: 12/01/24 0950    Specimen: Blood from Arm, Left Updated: 12/01/24 1010    Blood Culture With ROSEANN - Blood, Arm, Left [[id number]] Collected: 12/01/24 0950    Specimen: Blood from Arm, Left Updated: 12/01/24 1010    Comprehensive Metabolic Panel [[id number]]  (Abnormal) Collected: 12/01/24 0517    Specimen: Blood Updated: 12/01/24 0555     Glucose 106 mg/dL      BUN 17 mg/dL      Creatinine 0.62 mg/dL      Sodium 136 mmol/L      Potassium 4.1 mmol/L      Chloride 93 mmol/L      CO2 31.0 mmol/L      Calcium 8.8 mg/dL      Total Protein 7.4 g/dL      Albumin 3.9 g/dL      ALT (SGPT) 48 U/L      AST (SGOT) 33 U/L      Alkaline Phosphatase 125 U/L      Total Bilirubin 0.5 mg/dL      Globulin 3.5 gm/dL      A/G Ratio 1.1 g/dL      BUN/Creatinine Ratio 27.4     Anion  Gap 12.0 mmol/L      eGFR 87.9 mL/min/1.73     Narrative:      GFR Normal >60  Chronic Kidney Disease <60  Kidney Failure <15    The GFR formula is only valid for adults with stable renal function between ages 18 and 70.    Magnesium [[id number]]  (Normal) Collected: 12/01/24 0517    Specimen: Blood Updated: 12/01/24 0555     Magnesium 2.0 mg/dL     CBC Auto Differential [[id number]]  (Abnormal) Collected: 12/01/24 0517    Specimen: Blood Updated: 12/01/24 0547     WBC 16.58 10*3/mm3      RBC 4.14 10*6/mm3      Hemoglobin 12.5 g/dL      Hematocrit 39.3 %      MCV 94.9 fL      MCH 30.2 pg      MCHC 31.8 g/dL      RDW 12.4 %      RDW-SD 43.5 fl      MPV 8.8 fL      Platelets 297 10*3/mm3      Neutrophil % 80.4 %      Lymphocyte % 8.3 %      Monocyte % 10.1 %      Eosinophil % 0.1 %      Basophil % 0.5 %      Immature Grans % 0.6 %      Neutrophils, Absolute 13.33 10*3/mm3      Lymphocytes, Absolute 1.38 10*3/mm3      Monocytes, Absolute 1.67 10*3/mm3      Eosinophils, Absolute 0.02 10*3/mm3      Basophils, Absolute 0.08 10*3/mm3      Immature Grans, Absolute 0.10 10*3/mm3      nRBC 0.0 /100 WBC     High Sensitivity Troponin T 2Hr [[id number]] Collected: 11/30/24 1312    Specimen: Blood Updated: 11/30/24 1355     HS Troponin T <6 ng/L      Troponin T Delta --     Comment: Unable to calculate.       Narrative:      High Sensitive Troponin T Reference Range:  <14.0 ng/L- Negative Female for AMI  <22.0 ng/L- Negative Male for AMI  >=14 - Abnormal Female indicating possible myocardial injury.  >=22 - Abnormal Male indicating possible myocardial injury.   Clinicians would have to utilize clinical acumen, EKG, Troponin, and serial changes to determine if it is an Acute Myocardial Infarction or myocardial injury due to an underlying chronic condition.         Lactic Acid, Plasma [[id number]]  (Normal) Collected: 11/30/24 1252    Specimen: Blood Updated: 11/30/24 1321     Lactate 1.1 mmol/L     D-dimer, Quantitative [[id number]]   "(Normal) Collected: 11/30/24 1252    Specimen: Blood Updated: 11/30/24 1320     D-Dimer, Quantitative 0.56 MCGFEU/mL     Narrative:      According to the assay 's published package insert, a normal (<0.50 MCGFEU/mL) D-dimer result in conjunction with a non-high clinical probability assessment, excludes deep vein thrombosis (DVT) and pulmonary embolism (PE) with high sensitivity.    D-dimer values increase with age and this can make VTE exclusion of an older population difficult. To address this, the [employer], based on best available evidence and recent guidelines, recommends that clinicians use age-adjusted D-dimer thresholds in patients greater than 50 years of age with: a) a low probability of PE who do not meet all Pulmonary Embolism Rule Out Criteria, or b) in those with intermediate probability of PE.   The formula for an age-adjusted D-dimer cut-off is \"age/100\".  For example, a 60 year old patient would have an age-adjusted cut-off of 0.60 MCGFEU/mL and an 80 year old 0.80 MCGFEU/mL.    Respiratory Panel PCR w/COVID-19(SARS-CoV-2) CATINA/JOHANNA/RADHA/PAD/COR/ESTEBAN In-House, NP Swab in UTM/VTM, 2 HR TAT - Swab, Nasopharynx [[id number]]  (Normal) Collected: 11/30/24 1122    Specimen: Swab from Nasopharynx Updated: 11/30/24 1257     ADENOVIRUS, PCR Not Detected     Coronavirus 229E Not Detected     Coronavirus HKU1 Not Detected     Coronavirus NL63 Not Detected     Coronavirus OC43 Not Detected     COVID19 Not Detected     Human Metapneumovirus Not Detected     Human Rhinovirus/Enterovirus Not Detected     Influenza A PCR Not Detected     Influenza B PCR Not Detected     Parainfluenza Virus 1 Not Detected     Parainfluenza Virus 2 Not Detected     Parainfluenza Virus 3 Not Detected     Parainfluenza Virus 4 Not Detected     RSV, PCR Not Detected     Bordetella pertussis pcr Not Detected     Bordetella parapertussis PCR Not Detected     Chlamydophila pneumoniae PCR Not Detected     " Mycoplasma pneumo by PCR Not Detected    Narrative:      In the setting of a positive respiratory panel with a viral infection PLUS a negative procalcitonin without other underlying concern for bacterial infection, consider observing off antibiotics or discontinuation of antibiotics and continue supportive care. If the respiratory panel is positive for atypical bacterial infection (Bordetella pertussis, Chlamydophila pneumoniae, or Mycoplasma pneumoniae), consider antibiotic de-escalation to target atypical bacterial infection.    Comprehensive Metabolic Panel [[id number]]  (Abnormal) Collected: 11/30/24 1121    Specimen: Blood Updated: 11/30/24 1155     Glucose 116 mg/dL      BUN 15 mg/dL      Creatinine 0.62 mg/dL      Sodium 132 mmol/L      Potassium 4.2 mmol/L      Chloride 94 mmol/L      CO2 29.0 mmol/L      Calcium 8.9 mg/dL      Total Protein 7.5 g/dL      Albumin 4.0 g/dL      ALT (SGPT) 47 U/L      AST (SGOT) 40 U/L      Alkaline Phosphatase 119 U/L      Total Bilirubin 0.5 mg/dL      Globulin 3.5 gm/dL      A/G Ratio 1.1 g/dL      BUN/Creatinine Ratio 24.2     Anion Gap 9.0 mmol/L      eGFR 87.9 mL/min/1.73     Narrative:      GFR Normal >60  Chronic Kidney Disease <60  Kidney Failure <15    The GFR formula is only valid for adults with stable renal function between ages 18 and 70.    BNP [[id number]]  (Abnormal) Collected: 11/30/24 1121    Specimen: Blood Updated: 11/30/24 1153     proBNP 1,820.0 pg/mL     Narrative:      This assay is used as an aid in the diagnosis of individuals suspected of having heart failure. It can be used as an aid in the diagnosis of acute decompensated heart failure (ADHF) in patients presenting with signs and symptoms of ADHF to the emergency department (ED). In addition, NT-proBNP of <300 pg/mL indicates ADHF is not likely.    Age Range Result Interpretation  NT-proBNP Concentration (pg/mL:      <50             Positive            >450                   Isabel                  300-450                    Negative             <300    50-75           Positive            >900                  Gray                300-900                  Negative            <300      >75             Positive            >1800                  Gray                300-1800                  Negative            <300    High Sensitivity Troponin T [[id number]]  (Normal) Collected: 11/30/24 1121    Specimen: Blood Updated: 11/30/24 1152     HS Troponin T 6 ng/L     Narrative:      High Sensitive Troponin T Reference Range:  <14.0 ng/L- Negative Female for AMI  <22.0 ng/L- Negative Male for AMI  >=14 - Abnormal Female indicating possible myocardial injury.  >=22 - Abnormal Male indicating possible myocardial injury.   Clinicians would have to utilize clinical acumen, EKG, Troponin, and serial changes to determine if it is an Acute Myocardial Infarction or myocardial injury due to an underlying chronic condition.         CBC & Differential [[id number]]  (Abnormal) Collected: 11/30/24 1121    Specimen: Blood Updated: 11/30/24 1130    Narrative:      The following orders were created for panel order CBC & Differential.  Procedure                               Abnormality         Status                     ---------                               -----------         ------                     CBC Auto Differential[[id number]]        Abnormal            Final result                 Please view results for these tests on the individual orders.    CBC Auto Differential [[id number]]  (Abnormal) Collected: 11/30/24 1121    Specimen: Blood Updated: 11/30/24 1130     WBC 15.63 10*3/mm3      RBC 4.01 10*6/mm3      Hemoglobin 12.3 g/dL      Hematocrit 38.0 %      MCV 94.8 fL      MCH 30.7 pg      MCHC 32.4 g/dL      RDW 12.6 %      RDW-SD 43.8 fl      MPV 8.7 fL      Platelets 277 10*3/mm3      Neutrophil % 71.9 %      Lymphocyte % 15.9 %      Monocyte % 11.3 %      Eosinophil % 0.2 %      Basophil % 0.4 %      Immature Grans % 0.3 %   "    Neutrophils, Absolute 11.24 10*3/mm3      Lymphocytes, Absolute 2.49 10*3/mm3      Monocytes, Absolute 1.76 10*3/mm3      Eosinophils, Absolute 0.03 10*3/mm3      Basophils, Absolute 0.06 10*3/mm3      Immature Grans, Absolute 0.05 10*3/mm3      nRBC 0.0 /100 WBC                 Echo EF Estimated  Lab Results   Component Value Date    ECHOEFEST 50 05/17/2018         Cath Ejection Fraction Quantitative  No results found for: \"CATHEF\"        Medication Review: yes  Current Facility-Administered Medications   Medication Dose Route Frequency Provider Last Rate Last Admin    acetaminophen (TYLENOL) tablet 650 mg  650 mg Oral Q4H PRN Skyler Smith MD        Or    acetaminophen (TYLENOL) 160 MG/5ML oral solution 650 mg  650 mg Oral Q4H PRN Skyler Smith MD        Or    acetaminophen (TYLENOL) suppository 650 mg  650 mg Rectal Q4H PRN Skyler Smith MD        acyclovir (ZOVIRAX) capsule 400 mg  400 mg Oral BID Skyler Smith MD   400 mg at 12/01/24 0732    apixaban (ELIQUIS) tablet 5 mg  5 mg Oral Q12H Skyler Smith MD   5 mg at 12/01/24 0732    atorvastatin (LIPITOR) tablet 10 mg  10 mg Oral Every Other Day Skyler Smith MD   10 mg at 11/30/24 2058    Calcium Replacement - Follow Nurse / BPA Driven Protocol   Not Applicable PRN Skyler Smith MD        cefTRIAXone (ROCEPHIN) 1,000 mg in sodium chloride 0.9 % 100 mL MBP  1,000 mg Intravenous Q24H Donell Bach T, APRN        diazePAM (VALIUM) tablet 2 mg  2 mg Oral BID PRN Skyler Smith MD        dilTIAZem (CARDIZEM) 125 mg in 125 mL D5W infusion  5-15 mg/hr Intravenous Titrated Skyler Smith MD   Stopped at 12/01/24 0728    dilTIAZem CD (CARDIZEM CD) 24 hr capsule 240 mg  240 mg Oral Daily Skyler Smith MD   240 mg at 12/01/24 0734    ipratropium (ATROVENT) nebulizer solution 0.5 mg  500 mcg Nebulization 4x Daily PRN Skyler Smith Rachid, MD        ipratropium-albuterol (DUO-NEB) nebulizer " solution 3 mL  3 mL Nebulization 4x Daily - RT Donell Bach, SAMMY   3 mL at 12/01/24 1051    Magnesium Standard Dose Replacement - Follow Nurse / BPA Driven Protocol   Not Applicable Skyler Hale MD        metoprolol tartrate (LOPRESSOR) injection 5 mg  5 mg Intravenous Q4H PRSkyler Romano MD        Phosphorus Replacement - Follow Nurse / BPA Driven Protocol   Not Applicable Skyler Hale MD        Potassium Replacement - Follow Nurse / BPA Driven Protocol   Not Applicable Skyler Hale MD        sodium chloride 0.9 % flush 10 mL  10 mL Intravenous Q12H Skyler Smith MD        sodium chloride 0.9 % flush 10 mL  10 mL Intravenous PRSkyler Romano MD        sodium chloride 0.9 % infusion 40 mL  40 mL Intravenous PRSkyler Romano MD             Assessment & Plan     -Atrial Fibrillation: Rates today . continue cardizem and eliquis. URI driving increase in heart rate, expect as it is treated heart rate will continue to improve    -URI: treatment per primary     -COPD: patient sleeps with oxygen at home. Currently wearing 2-3 L via NC    Plan:   - continue cardizem and eliquis  - continue to treat URI per primary; URI driving increase in heart rate, expect as it is treated heart rate will continue to improve  - Cardiology will sign off. Thank you for allowing us to participate in the care of your patient.  Please do not hesitate to contact us if we can be of any further assistance.    Electronically signed by SAMMY Awan, 12/01/24, 11:28 AM CST.

## 2024-12-02 VITALS
HEART RATE: 113 BPM | BODY MASS INDEX: 24.86 KG/M2 | WEIGHT: 145.6 LBS | SYSTOLIC BLOOD PRESSURE: 104 MMHG | DIASTOLIC BLOOD PRESSURE: 72 MMHG | RESPIRATION RATE: 18 BRPM | HEIGHT: 64 IN | TEMPERATURE: 98.1 F | OXYGEN SATURATION: 92 %

## 2024-12-02 LAB
ALBUMIN SERPL-MCNC: 3.9 G/DL (ref 3.5–5.2)
ALBUMIN/GLOB SERPL: 1.1 G/DL
ALP SERPL-CCNC: 131 U/L (ref 39–117)
ALT SERPL W P-5'-P-CCNC: 46 U/L (ref 1–33)
ANION GAP SERPL CALCULATED.3IONS-SCNC: 10 MMOL/L (ref 5–15)
AST SERPL-CCNC: 25 U/L (ref 1–32)
BACTERIA SPEC AEROBE CULT: NO GROWTH
BASOPHILS # BLD AUTO: 0.04 10*3/MM3 (ref 0–0.2)
BASOPHILS NFR BLD AUTO: 0.3 % (ref 0–1.5)
BILIRUB SERPL-MCNC: 0.2 MG/DL (ref 0–1.2)
BUN SERPL-MCNC: 23 MG/DL (ref 8–23)
BUN/CREAT SERPL: 38.3 (ref 7–25)
CALCIUM SPEC-SCNC: 9.6 MG/DL (ref 8.6–10.5)
CHLORIDE SERPL-SCNC: 90 MMOL/L (ref 98–107)
CO2 SERPL-SCNC: 29 MMOL/L (ref 22–29)
CREAT SERPL-MCNC: 0.6 MG/DL (ref 0.57–1)
DEPRECATED RDW RBC AUTO: 41.1 FL (ref 37–54)
EGFRCR SERPLBLD CKD-EPI 2021: 88.6 ML/MIN/1.73
EOSINOPHIL # BLD AUTO: 0 10*3/MM3 (ref 0–0.4)
EOSINOPHIL NFR BLD AUTO: 0 % (ref 0.3–6.2)
ERYTHROCYTE [DISTWIDTH] IN BLOOD BY AUTOMATED COUNT: 11.9 % (ref 12.3–15.4)
GLOBULIN UR ELPH-MCNC: 3.6 GM/DL
GLUCOSE SERPL-MCNC: 168 MG/DL (ref 65–99)
HCT VFR BLD AUTO: 38.2 % (ref 34–46.6)
HGB BLD-MCNC: 12.4 G/DL (ref 12–15.9)
IMM GRANULOCYTES # BLD AUTO: 0.15 10*3/MM3 (ref 0–0.05)
IMM GRANULOCYTES NFR BLD AUTO: 1 % (ref 0–0.5)
LYMPHOCYTES # BLD AUTO: 1.47 10*3/MM3 (ref 0.7–3.1)
LYMPHOCYTES NFR BLD AUTO: 10.2 % (ref 19.6–45.3)
MCH RBC QN AUTO: 30.4 PG (ref 26.6–33)
MCHC RBC AUTO-ENTMCNC: 32.5 G/DL (ref 31.5–35.7)
MCV RBC AUTO: 93.6 FL (ref 79–97)
MONOCYTES # BLD AUTO: 0.38 10*3/MM3 (ref 0.1–0.9)
MONOCYTES NFR BLD AUTO: 2.6 % (ref 5–12)
NEUTROPHILS NFR BLD AUTO: 12.37 10*3/MM3 (ref 1.7–7)
NEUTROPHILS NFR BLD AUTO: 85.9 % (ref 42.7–76)
NRBC BLD AUTO-RTO: 0 /100 WBC (ref 0–0.2)
PLATELET # BLD AUTO: 333 10*3/MM3 (ref 140–450)
PMV BLD AUTO: 8.9 FL (ref 6–12)
POTASSIUM SERPL-SCNC: 4.3 MMOL/L (ref 3.5–5.2)
PROT SERPL-MCNC: 7.5 G/DL (ref 6–8.5)
RBC # BLD AUTO: 4.08 10*6/MM3 (ref 3.77–5.28)
SODIUM SERPL-SCNC: 129 MMOL/L (ref 136–145)
WBC NRBC COR # BLD AUTO: 14.41 10*3/MM3 (ref 3.4–10.8)

## 2024-12-02 PROCEDURE — 94799 UNLISTED PULMONARY SVC/PX: CPT

## 2024-12-02 PROCEDURE — 80053 COMPREHEN METABOLIC PANEL: CPT

## 2024-12-02 PROCEDURE — G0378 HOSPITAL OBSERVATION PER HR: HCPCS

## 2024-12-02 PROCEDURE — 63710000001 PREDNISONE PER 1 MG: Performed by: FAMILY MEDICINE

## 2024-12-02 PROCEDURE — 85025 COMPLETE CBC W/AUTO DIFF WBC: CPT

## 2024-12-02 RX ORDER — PREDNISONE 10 MG/1
5 TABLET ORAL DAILY
Status: DISCONTINUED | OUTPATIENT
Start: 2024-12-11 | End: 2024-12-02 | Stop reason: HOSPADM

## 2024-12-02 RX ORDER — PREDNISONE 10 MG/1
10 TABLET ORAL DAILY
Status: DISCONTINUED | OUTPATIENT
Start: 2024-12-08 | End: 2024-12-02 | Stop reason: HOSPADM

## 2024-12-02 RX ORDER — CEFDINIR 300 MG/1
300 CAPSULE ORAL EVERY 12 HOURS SCHEDULED
Status: DISCONTINUED | OUTPATIENT
Start: 2024-12-02 | End: 2024-12-02 | Stop reason: HOSPADM

## 2024-12-02 RX ORDER — PREDNISONE 10 MG/1
15 TABLET ORAL DAILY
Status: DISCONTINUED | OUTPATIENT
Start: 2024-12-05 | End: 2024-12-02 | Stop reason: HOSPADM

## 2024-12-02 RX ORDER — PREDNISONE 20 MG/1
20 TABLET ORAL DAILY
Status: DISCONTINUED | OUTPATIENT
Start: 2024-12-02 | End: 2024-12-02 | Stop reason: HOSPADM

## 2024-12-02 RX ORDER — PREDNISONE 5 MG/1
TABLET ORAL
Qty: 30 TABLET | Refills: 0 | Status: SHIPPED | OUTPATIENT
Start: 2024-12-02 | End: 2024-12-13

## 2024-12-02 RX ORDER — CEFDINIR 300 MG/1
300 CAPSULE ORAL EVERY 12 HOURS SCHEDULED
Qty: 14 CAPSULE | Refills: 0 | Status: SHIPPED | OUTPATIENT
Start: 2024-12-02 | End: 2024-12-09

## 2024-12-02 RX ADMIN — APIXABAN 5 MG: 5 TABLET, FILM COATED ORAL at 08:50

## 2024-12-02 RX ADMIN — IPRATROPIUM BROMIDE 0.5 MG: 0.5 SOLUTION RESPIRATORY (INHALATION) at 06:59

## 2024-12-02 RX ADMIN — Medication 10 ML: at 08:51

## 2024-12-02 RX ADMIN — CEFDINIR 300 MG: 300 CAPSULE ORAL at 08:51

## 2024-12-02 RX ADMIN — ACYCLOVIR 400 MG: 200 CAPSULE ORAL at 08:50

## 2024-12-02 RX ADMIN — DILTIAZEM HYDROCHLORIDE 240 MG: 120 CAPSULE, EXTENDED RELEASE ORAL at 08:50

## 2024-12-02 RX ADMIN — PREDNISONE 20 MG: 20 TABLET ORAL at 08:50

## 2024-12-02 RX ADMIN — IPRATROPIUM BROMIDE 0.5 MG: 0.5 SOLUTION RESPIRATORY (INHALATION) at 10:13

## 2024-12-02 NOTE — PLAN OF CARE
Goal Outcome Evaluation:  Plan of Care Reviewed With: patient        Progress: no change  Outcome Evaluation: Patient oriented x4 with VSS on 2L NC. No c/o pain.  at bedside. Rest has been decent with no major changes during the shift.

## 2024-12-02 NOTE — DISCHARGE SUMMARY
Hospital Discharge Summary    Merari Gaspar  :  1940  MRN:  [id number]    Admit date:  2024  Discharge date:  2024    Admitting Physician:    Nash Javier MD    Discharge Diagnoses:      Hypoxia    Panlobular emphysema    A-fib    Right ventricular dysfunction    COPD with acute exacerbation      Hospital Course: Pleasant elderly white female with longstanding COPD emphysema that presented to acute care with cough cold congestion.  She was given a nebulizer treatment who exacerbated her atrial fibrillation resulting in rapid ventricular response.  Patient was admitted for diltiazem drip and better rate control.        The patient was admitted for the above surgical/medical indication.  Please see admission H&P for further details concerning the admission.  The patient was seen daily and progress noted via daily updates in the progress notes.  The patient improved throughout her stay.    Patient nebulizers were changed from DuoNebs to Atrovent only for better rate control.  Her IV antibiotics were switched to oral IV steroids which oil as well on the morning  she reached max inpatient benefit will be to discharge home with close follow-up in the office later this week.      They reached maximum medical improvement and were considered stable for discharge home.  They understand the importance of follow-up concerning any abnormal lab values/x-rays.  All questions were answered to the best of my ability prior to their discharge home.  Discharge Medications:         Discharge Medications        New Medications        Instructions Start Date   cefdinir 300 MG capsule  Commonly known as: OMNICEF   300 mg, Oral, Every 12 Hours Scheduled      predniSONE 5 MG tablet  Commonly known as: DELTASONE   Take 4 tablets by mouth Daily for 3 days, THEN 3 tablets Daily for 3 days, THEN 2 tablets Daily for 3 days, THEN 1 tablet Daily for 3 days.   Start Date: 2024            Continue  These Medications        Instructions Start Date   acyclovir 400 MG tablet  Commonly known as: ZOVIRAX   1 tablet, Oral, 2 Times Daily      apixaban 5 MG tablet tablet  Commonly known as: ELIQUIS   5 mg, Oral, Every 12 Hours Scheduled      coenzyme Q10 100 MG capsule   100 mg, Nightly      diazePAM 2 MG tablet  Commonly known as: VALIUM   1 tablet, Oral, 2 Times Daily PRN      dilTIAZem  MG 24 hr capsule  Commonly known as: CARDIZEM CD   240 mg, Oral, Daily      furosemide 20 MG tablet  Commonly known as: LASIX   20 mg, Oral, Daily PRN      ipratropium 0.02 % nebulizer solution  Commonly known as: ATROVENT   0.5 mg, Nebulization, 4 Times Daily PRN      lovastatin 20 MG tablet  Commonly known as: MEVACOR   20 mg, Oral, Every Other Day, Alternate half-tablet (10mg) dose every other day.       multivitamin with minerals tablet tablet   1 tablet, Daily      O2  Commonly known as: OXYGEN   2 L, Once               Consults: Cardiology    Significant Diagnostic Studies:      EKG: normal EKG, normal sinus rhythm, unchanged from previous tracings, atrial fibrillation, rate initially 1/21/1930 down to 80/90.      Treatments:   IV antibiotics/IV steroids rate control    Disposition:   Home in improved condition  Follow up with Nash Javier MD in 3 to 4 days.    Signed:  Nash Javier MD   12/2/2024, 07:51 CST

## 2024-12-06 LAB
BACTERIA SPEC AEROBE CULT: NORMAL
BACTERIA SPEC AEROBE CULT: NORMAL

## 2024-12-30 ENCOUNTER — TELEPHONE (OUTPATIENT)
Dept: PULMONOLOGY | Facility: CLINIC | Age: 84
End: 2024-12-30
Payer: MEDICARE

## 2024-12-30 NOTE — TELEPHONE ENCOUNTER
If she's been having recent issues with her COPD she could trial Breztri in place of her Spiriva. Though the side effect profile is extensive, most patients manage well on it. Tell her to make sure she rinses her mouth well after each dose, that can really help to decrease the most common side effects. Thanks.

## 2024-12-30 NOTE — TELEPHONE ENCOUNTER
She called stating she went to Dr. Javier's office and they gave her breztri inhaler but she hasn't taken it yet.  She is wanting your opinion on it.    She read the side affects and she wasn't to sure of it.          I told her you was out until tomorrow and wouldn't see this until tomorrow.

## 2025-03-17 RX ORDER — DILTIAZEM HYDROCHLORIDE 240 MG/1
240 CAPSULE, EXTENDED RELEASE ORAL DAILY
Qty: 90 CAPSULE | Refills: 3 | Status: SHIPPED | OUTPATIENT
Start: 2025-03-17

## 2025-03-28 ENCOUNTER — OFFICE VISIT (OUTPATIENT)
Dept: PULMONOLOGY | Facility: CLINIC | Age: 85
End: 2025-03-28
Payer: MEDICARE

## 2025-03-28 VITALS
DIASTOLIC BLOOD PRESSURE: 72 MMHG | WEIGHT: 147 LBS | HEART RATE: 118 BPM | HEIGHT: 63 IN | SYSTOLIC BLOOD PRESSURE: 126 MMHG | OXYGEN SATURATION: 91 % | BODY MASS INDEX: 26.05 KG/M2

## 2025-03-28 DIAGNOSIS — G47.34 NOCTURNAL HYPOXEMIA: Chronic | ICD-10-CM

## 2025-03-28 DIAGNOSIS — J43.1 PANLOBULAR EMPHYSEMA: Chronic | ICD-10-CM

## 2025-03-28 DIAGNOSIS — J44.9 STAGE 3 SEVERE COPD BY GOLD CLASSIFICATION: Primary | Chronic | ICD-10-CM

## 2025-03-28 DIAGNOSIS — Z87.891 FORMER SMOKER: Chronic | ICD-10-CM

## 2025-03-28 RX ORDER — NYSTATIN 100000 [USP'U]/ML
500000 SUSPENSION ORAL 4 TIMES DAILY
COMMUNITY

## 2025-03-28 NOTE — PROGRESS NOTES
"Chief Complaint  COPD    Subjective    History of Present Illness      Merari Gaspar presents to [facility] for:    History of Present Illness   Annual appointment for management of severe COPD with emphysema and nocturnal hypoxia.  She had a CTA of the chest in November showing lung scarring with diffuse emphysema.  She has had updated spirometry today that shows severe but stable obstruction.  She quit smoking 8 years ago.  She continues to wear and benefit from nighttime oxygen.  She is not currently on any inhaler therapy and wants to avoid them if possible.  She gets thrush very easily.  Her PCP gave her some samples of Breztri and she did not do well on it.  She has been doing Atrovent neb treatments.  I am providing her 7 days worth of Yupelri samples and she will call the office if she would like to continue on it.  She is asked about getting a flutter valve and have provided her some information and told her where she can obtain one.  She stays up-to-date on vaccines.  Objective   Vital Signs:   /72   Pulse 118   Ht 160 cm (63\")   Wt 66.7 kg (147 lb)   SpO2 91% Comment: RA  BMI 26.04 kg/m²     Physical Exam  Vitals reviewed.   Constitutional:       General: She is not in acute distress.     Appearance: Normal appearance.   HENT:      Head: Normocephalic and atraumatic.   Cardiovascular:      Rate and Rhythm: Normal rate and regular rhythm.   Pulmonary:      Breath sounds: Decreased breath sounds (At bases only, otherwise clear) present.   Musculoskeletal:      Cervical back: Normal range of motion.   Skin:     General: Skin is warm and dry.   Neurological:      Mental Status: She is alert and oriented to person, place, and time.   Psychiatric:         Mood and Affect: Mood normal.         Behavior: Behavior normal.        Result Review :   The following data was reviewed by: SAMMY Hodges on 03/28/2025:  CT Angiogram Chest " (11/30/2024 12:00)   Results for orders placed in visit on 03/28/25    Spirometry    Narrative  Spirometry    Performed by: Daksha Johnston RRT  Authorized by: Jostin Mcqueen APRN  Pre Drug % Predicted  FVC: 42%  FEV1: 37%  FEF 25-75%: 27%  FEV1/FVC: 67%    Interpretation  Spirometry  Spirometry shows severe obstruction. There is reduced midflow suggesting small airway/airflow obstruction.      Results for orders placed in visit on 03/28/24    Spirometry    Narrative  Spirometry    Performed by: Daksha Johnston RRT  Authorized by: Jostin Mcqueen APRN  Pre Drug % Predicted  FVC: 46%  FEV1: 39%  FEF 25-75%: 26%  FEV1/FVC: 64%    Interpretation  Spirometry  Spirometry shows severe obstruction. There is reduced midflow suggesting small airway/airflow obstruction.      Results for orders placed in visit on 03/18/22    Pulmonary Function Test    Narrative  Pulmonary Function Test  Performed by: Daksha Johnston RRT  Authorized by: Jostin Mcqueen APRN    Pre Drug % Predicted  FVC: 50%  FEV1: 42%  FEF 25-75%: 25%  FEV1/FVC: 63%    Interpretation  Spirometry  Spirometry shows severe obstruction. There is reduced midflow suggesting small airway/airflow obstruction.  Overall comments: Severe but stable obstruction.             Assessment and Plan      Diagnoses and all orders for this visit:    1. Stage 3 severe COPD by GOLD classification (Primary)  Comments:  Stable.  She does not tolerate inhalers well.  She will trial Yupelri.  She uses Atrovent in her nebulizer otherwise.  Flow-volume loop on return.  Orders:  -     Spirometry  -     OSCILLATING POSITIVE EXIRATORY PRESSURE (OPEP); Future  -     revefenacin (YUPELRI) 175 MCG/3ML nebulizer solution; Take 3 mL by nebulization Daily.  Dispense: 21 mL; Refill: 0    2. Nocturnal hypoxemia  Comments:  Able.  She continues to wear and benefit from nighttime oxygen.    3. Former smoker  Comments:  Quit 8 years ago.  She does not  qualify for annual LDCT due to age.  Get chest x-ray prior to next office visit.  Orders:  -     XR Chest 2 View; Future    4. Panlobular emphysema  Comments:  Stable.  She quit smoking 8 years ago.  Continue to monitor.  Orders:  -     revefenacin (YUPELRI) 175 MCG/3ML nebulizer solution; Take 3 mL by nebulization Daily.  Dispense: 21 mL; Refill: 0      SAMMY Hodges  3/28/2025  10:47 CDT    Follow Up   Return in about 1 year (around 3/28/2026) for FVL, CXR.    Patient was given instructions and counseling regarding her condition or for health maintenance advice. Please see specific information pulled into the AVS if appropriate.

## 2025-03-28 NOTE — PROCEDURES
Spirometry    Performed by: Daksha Johnston, RRT  Authorized by: Jostin Mcqueen APRN     Pre Drug % Predicted    FVC: 42%   FEV1: 37%   FEF 25-75%: 27%   FEV1/FVC: 67%    Interpretation   Spirometry   Spirometry shows severe obstruction. There is reduced midflow suggesting small airway/airflow obstruction.

## 2025-08-27 ENCOUNTER — OFFICE VISIT (OUTPATIENT)
Dept: CARDIOLOGY | Facility: CLINIC | Age: 85
End: 2025-08-27
Payer: MEDICARE

## 2025-08-27 VITALS
DIASTOLIC BLOOD PRESSURE: 72 MMHG | BODY MASS INDEX: 25.52 KG/M2 | WEIGHT: 144 LBS | OXYGEN SATURATION: 98 % | SYSTOLIC BLOOD PRESSURE: 120 MMHG | HEART RATE: 98 BPM | HEIGHT: 63 IN

## 2025-08-27 DIAGNOSIS — I51.9 RIGHT VENTRICULAR DYSFUNCTION: ICD-10-CM

## 2025-08-27 DIAGNOSIS — I50.32 HEART FAILURE WITH RECOVERED EJECTION FRACTION (HFRECEF): Chronic | ICD-10-CM

## 2025-08-27 DIAGNOSIS — I48.21 PERMANENT ATRIAL FIBRILLATION: Primary | ICD-10-CM

## 2025-08-27 DIAGNOSIS — I10 ESSENTIAL HYPERTENSION: Chronic | ICD-10-CM

## 2025-08-27 PROBLEM — I48.91 A-FIB: Chronic | Status: ACTIVE | Noted: 2018-05-20
